# Patient Record
Sex: MALE | Race: WHITE | NOT HISPANIC OR LATINO | Employment: OTHER | URBAN - METROPOLITAN AREA
[De-identification: names, ages, dates, MRNs, and addresses within clinical notes are randomized per-mention and may not be internally consistent; named-entity substitution may affect disease eponyms.]

---

## 2017-07-05 ENCOUNTER — TELEPHONE (OUTPATIENT)
Dept: FAMILY MEDICINE | Facility: OTHER | Age: 77
End: 2017-07-05

## 2017-07-05 DIAGNOSIS — I10 ESSENTIAL HYPERTENSION WITH GOAL BLOOD PRESSURE LESS THAN 140/90: ICD-10-CM

## 2017-07-05 DIAGNOSIS — R33.8 BENIGN PROSTATIC HYPERPLASIA WITH URINARY RETENTION: ICD-10-CM

## 2017-07-05 DIAGNOSIS — E78.5 HYPERLIPIDEMIA LDL GOAL <130: ICD-10-CM

## 2017-07-05 DIAGNOSIS — N40.1 BENIGN PROSTATIC HYPERPLASIA WITH URINARY RETENTION: ICD-10-CM

## 2017-07-06 NOTE — TELEPHONE ENCOUNTER
losartan      Last Written Prescription Date: 08/31/2016  Last Fill Quantity: 90, # refills: 3  Last Office Visit with [facility] or [facility] prescribing provider: 08/31/2016       Potassium   Date Value Ref Range Status   08/31/2016 4.2 3.4 - 5.3 mmol/L Final     Creatinine   Date Value Ref Range Status   08/31/2016 0.74 0.66 - 1.25 mg/dL Final     BP Readings from Last 3 Encounters:   08/31/16 124/72   04/25/16 120/66   10/07/15 137/77     tamsulosin         Last Written Prescription Date: 08/31/2016  Last Fill Quantity: 90, # refills: 3    Last Office Visit with [facility] or [facility] prescribing provider:  08/31/2016   Future Office Visit:      BP Readings from Last 3 Encounters:   08/31/16 124/72   04/25/16 120/66   10/07/15 137/77     Pravastatin     Last Written Prescription Date: 08/31/2016  Last Fill Quantity: 90, # refills: 3  Last Office Visit with [facility] or [facility] prescribing provider: 08/31/2016       Lab Results   Component Value Date    CHOL 125 08/31/2016     Lab Results   Component Value Date    HDL 35 08/31/2016     Lab Results   Component Value Date    LDL 69 08/31/2016     Lab Results   Component Value Date    TRIG 104 08/31/2016     Lab Results   Component Value Date    CHOLHDLRATIO 4.3 09/24/2015         Karina Edward MA

## 2017-07-07 RX ORDER — LOSARTAN POTASSIUM 25 MG/1
TABLET ORAL
Qty: 90 TABLET | Refills: 0 | Status: SHIPPED | OUTPATIENT
Start: 2017-07-07 | End: 2017-08-10

## 2017-07-07 RX ORDER — TAMSULOSIN HYDROCHLORIDE 0.4 MG/1
CAPSULE ORAL
Qty: 90 CAPSULE | Refills: 0 | Status: SHIPPED | OUTPATIENT
Start: 2017-07-07 | End: 2017-08-10

## 2017-07-07 RX ORDER — PRAVASTATIN SODIUM 40 MG
TABLET ORAL
Qty: 90 TABLET | Refills: 0 | Status: SHIPPED | OUTPATIENT
Start: 2017-07-07 | End: 2017-08-10

## 2017-07-07 NOTE — TELEPHONE ENCOUNTER
pravastatin (PRAVACHOL) 40 MG tablet  Medication is being filled for 1 time refill only due to:  Future labs ordered fasting lipid panel in 08/2017. Patient needs to be seen because due for physical 08/2017.     tamsulosin (FLOMAX) 0.4 MG 24 hr capsule  Medication is being filled for 1 time refill only due to:  Patient needs to be seen because ddue for physcial 08/2017.     losartan (COZAAR) 25 MG tablet  Medication is being filled for 1 time refill only due to:  Patient needs to be seen because due for physical 08/2017.       Please assist with scheduling.    Tootie Munson, RN, BSN

## 2017-07-12 ENCOUNTER — HOSPITAL ENCOUNTER (EMERGENCY)
Facility: CLINIC | Age: 77
Discharge: HOME | End: 2017-07-12
Attending: FAMILY MEDICINE | Admitting: FAMILY MEDICINE
Payer: MEDICARE

## 2017-07-12 ENCOUNTER — APPOINTMENT (OUTPATIENT)
Dept: GENERAL RADIOLOGY | Facility: CLINIC | Age: 77
End: 2017-07-12
Attending: FAMILY MEDICINE
Payer: MEDICARE

## 2017-07-12 VITALS
DIASTOLIC BLOOD PRESSURE: 77 MMHG | OXYGEN SATURATION: 96 % | SYSTOLIC BLOOD PRESSURE: 137 MMHG | TEMPERATURE: 98.5 F | BODY MASS INDEX: 34.46 KG/M2 | RESPIRATION RATE: 14 BRPM | WEIGHT: 230 LBS
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
## 2017-07-12 DIAGNOSIS — S62.639B: Primary | ICD-10-CM

## 2017-07-12 DIAGNOSIS — S62.604B: ICD-10-CM

## 2017-07-12 PROCEDURE — 99284 EMERGENCY DEPT VISIT MOD MDM: CPT | Mod: 25 | Performed by: FAMILY MEDICINE

## 2017-07-12 PROCEDURE — 11760 REPAIR OF NAIL BED: CPT | Mod: Z6 | Performed by: FAMILY MEDICINE

## 2017-07-12 PROCEDURE — 11760 REPAIR OF NAIL BED: CPT | Performed by: FAMILY MEDICINE

## 2017-07-12 PROCEDURE — 90715 TDAP VACCINE 7 YRS/> IM: CPT | Performed by: FAMILY MEDICINE

## 2017-07-12 PROCEDURE — 73130 X-RAY EXAM OF HAND: CPT | Mod: TC,RT

## 2017-07-12 PROCEDURE — 25000128 H RX IP 250 OP 636: Performed by: FAMILY MEDICINE

## 2017-07-12 PROCEDURE — 90471 IMMUNIZATION ADMIN: CPT | Performed by: FAMILY MEDICINE

## 2017-07-12 RX ORDER — LIDOCAINE HYDROCHLORIDE 10 MG/ML
10 INJECTION, SOLUTION INFILTRATION; PERINEURAL ONCE
Status: DISCONTINUED | OUTPATIENT
Start: 2017-07-12 | End: 2017-07-12 | Stop reason: HOSPADM

## 2017-07-12 RX ADMIN — CLOSTRIDIUM TETANI TOXOID ANTIGEN (FORMALDEHYDE INACTIVATED), CORYNEBACTERIUM DIPHTHERIAE TOXOID ANTIGEN (FORMALDEHYDE INACTIVATED), BORDETELLA PERTUSSIS TOXOID ANTIGEN (GLUTARALDEHYDE INACTIVATED), BORDETELLA PERTUSSIS FILAMENTOUS HEMAGGLUTININ ANTIGEN (FORMALDEHYDE INACTIVATED), BORDETELLA PERTUSSIS PERTACTIN ANTIGEN, AND BORDETELLA PERTUSSIS FIMBRIAE 2/3 ANTIGEN 0.5 ML: 5; 2; 2.5; 5; 3; 5 INJECTION, SUSPENSION INTRAMUSCULAR at 16:16

## 2017-07-12 ASSESSMENT — ENCOUNTER SYMPTOMS: WOUND: 1

## 2017-07-12 NOTE — ED NOTES
"Pt presents to the ED with concerns of an injury to this right 4 th finger that continues to \"ooze.\"  Pt hurt his hand on a garage door yesterday afternoon.    "

## 2017-07-12 NOTE — ED AVS SNAPSHOT
[facility] Emergency Department    [address] [place] [address]    Phone:  [phone]    Fax:  [phone]                                       Juaquin Shea   MRN: [id number]    Department:  [facility] Emergency Department   Date of Visit:  7/12/2017           Patient Information     Date Of Birth          1940        Your diagnoses for this visit were:     Traumatic open fracture of distal phalanx of finger, initial encounter (right fourth)        You were seen by Michel Edward MD.      Follow-up Information     Follow up with Timo Howard MD. Schedule an appointment as soon as possible for a visit in 1 week.    Specialty:  Family Practice    Why:  For suture removal and repeat exam of this injury    Contact information:    [facility]  [address] [place] [id number] [phone]          Discharge Instructions       Post-procedure notes from Dr Edward:    You have a fracture and laceration of the fourth finger on the right hand. This is called an open fracture and you need to be on antibiotics for this.  I sent the prescription to the [facility] pharmacy.      1. We covered this with petroleum jelly and a dressing today. Please do not use any topical antibiotic on this. The skin has a balance of bacteria and yeast that is neccesary for skin health and healing, and we do not want to affect this. Please use vaseline and a band-aid on the repair to keep it soft as this heals.    2. Keep the area clean and dry for the next 24 hours.  After that it is okay to get the site wet from swimming, showering or bathing.  I recommend that you avoid using a hot tub for the next 4- 5 days.    3. The area around the stitches may start to get red, and that is not a sign of infection in most cases.  The skin is reacting to the stitches and the redness will go away once the stitches are taken out.    4. Plan to sed Dr Howard to get the stitches out  in 7 days.  He will look at this repair and make sure it is healing well.     Skin takes a full twelve months to remodel, but usually after about three months you can see what you have for life.  Please treat the wound gently and keep it covered from the sun as it heals.    Tetanus status: your last tetanus was 2010 and we did not update that today.    Thank you for choosing our Emergency Department for your care.     Sincerely,    Dr Daniel Edward M.D.          Future Appointments        Provider Department Dept Phone Center    8/10/2017 8:45 AM Timo Howard MD, MD [facility] [phone] [facility]      24 Hour Appointment Hotline       To make an appointment at any [facility], call [phone] ([phone]). If you don't have a family doctor or clinic, we will help you find one. [facility] are conveniently located to serve the needs of you and your family.             Review of your medicines      START taking        Dose / Directions Last dose taken    amoxicillin-clavulanate 875-125 MG per tablet   Commonly known as:  AUGMENTIN   Dose:  1 tablet   Quantity:  14 tablet        Take 1 tablet by mouth 2 times daily for 7 days   Refills:  0          Our records show that you are taking the medicines listed below. If these are incorrect, please call your family doctor or clinic.        Dose / Directions Last dose taken    ACETAMINOPHEN 8 HOUR 650 MG 8 hour tablet   Dose:  650 mg   Quantity:  100 tablet   Generic drug:  acetaminophen        Take 650 mg by mouth every 6 hours   Refills:  0        ascorbic acid 1000 MG Tabs   Commonly known as:  vitamin C   Dose:  1000 mg        Take 1 tablet by mouth daily.   Refills:  0        aspirin 81 MG tablet   Dose:  1 tablet        Take 1 tablet by mouth daily.   Refills:  3        fish oil-omega-3 fatty acids 1000 MG capsule   Dose:  1 g        Take 1 g by mouth daily.   Refills:  0        ibuprofen 200 MG tablet   Commonly known  as:  ADVIL/MOTRIN   Dose:  600 mg   Indication:  Mild to Moderate Pain        Take 600 mg by mouth as needed. Indications: Mild to Moderate Pain   Refills:  0        losartan 25 MG tablet   Commonly known as:  COZAAR   Quantity:  90 tablet        TAKE 1 TABLET EVERY DAY   Refills:  0        pravastatin 40 MG tablet   Commonly known as:  PRAVACHOL   Quantity:  90 tablet        TAKE 1 TABLET EVERY DAY   Refills:  0        tamsulosin 0.4 MG capsule   Commonly known as:  FLOMAX   Quantity:  90 capsule        TAKE 1 CAPSULE EVERY DAY   Refills:  0                Prescriptions were sent or printed at these locations (1 Prescription)                   [facility] [address] [place] [address] [address]   [address] [address]    Telephone:  [phone]   Fax:  [phone]   Hours:                  E-Prescribed (1 of 1)         amoxicillin-clavulanate (AUGMENTIN) 875-125 MG per tablet                Procedures and tests performed during your visit     Hand XR, G/E 3 views, right      Orders Needing Specimen Collection     None      Pending Results     No orders found from 7/10/2017 to 7/13/2017.            Pending Culture Results     No orders found from 7/10/2017 to 7/13/2017.            Pending Results Instructions     If you had any lab results that were not finalized at the time of your Discharge, you can call the ED Lab Result RN at [phone]. You will be contacted by this team for any positive Lab results or changes in treatment. The nurses are available 7 days a week from 10A to 6:30P.  You can leave a message 24 hours per day and they will return your call.        Thank you for choosing [facility]       Thank you for choosing [facility] for your care. Our goal is always to provide you with excellent care. Hearing back from our patients is one way we can continue to improve our services. Please take a few minutes to complete the written survey that you may receive in the mail after you  "visit with us. Thank you!        [email or web address] Information     [facility] [employer] lets you send messages to your doctor, view your test results, renew your prescriptions, schedule appointments and more. To sign up, go to [email or web address] . Click on \"Log in\" on the left side of the screen, which will take you to the Welcome page. Then click on \"Sign up Now\" on the right side of the page.     You will be asked to enter the access code listed below, as well as some personal information. Please follow the directions to create your username and password.     Your access code is: [email or web address]  Expires: 10/10/2017  4:20 PM     Your access code will  in 90 days. If you need help or a new code, please call your [facility] clinic or [phone].        Care EveryWhere ID     This is your Care EveryWhere ID. This could be used by other organizations to access your [facility] medical records  [id number]        Equal Access to Services     DANYELLE ANDERSEN : Hadii tono walton hadasho Solongali, waaxda luqadaha, qaybta kaalmada adeegyamachelle, rukhsana bird . So [facility] [phone].    ATENCIÓN: Si habla español, tiene a palomino disposición servicios gratuitos de asistencia lingüística. Llame al [phone].    We comply with applicable federal civil rights laws and [place] laws. We do not discriminate on the basis of race, color, national origin, age, disability sex, sexual orientation or gender identity.            After Visit Summary       This is your record. Keep this with you and show to your community pharmacist(s) and doctor(s) at your next visit.                  "

## 2017-07-12 NOTE — ED PROVIDER NOTES
History     Chief Complaint   Patient presents with     Hand Injury     The history is provided by the patient.     Juaquin Shea is a 77 year old male who presents to the ED with complaints of a hand injury. The patient states that he shut his right 4th finger in the garage door yesterday afternoon at approximately 15:00. He says that, he heard it pop when the incident happened. He reports that he bandaged it in gauze yesterday but it kept bleeding through the bandages, except it did not bleed through overnight. He endorses that when he removed the bandage this morning, it began dripping blood again and he has been unable to stop the bleeding since.     I have reviewed the Medications, Allergies, Past Medical and Surgical History, and Social History in the Epic system.    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 and other lower urinary tract symptoms (LUTS)     Tinnitus     Microalbuminuria     LVH (left ventricular hypertrophy)     HYPERLIPIDEMIA LDL GOAL <130     Advanced directives, counseling/discussion     S/P total knee arthroplasty     DVT prophylaxis     Past Medical History:   Diagnosis Date     Benign neoplasm of colon 2007    adenomatous colon polyps removed     Degeneration of cervical intervertebral disc 2002    multi-level     Degeneration of lumbar or lumbosacral intervertebral disc 2007    multi-level     Erectile dysfunction      Hypertrophy of breast 2004    benign US consistent with gynecomastia     Impaired fasting glucose 2006    fasting      LVH (left ventricular hypertrophy) 5/17/10    suspected, by voltage criteria on ECG     Microalbuminuria  5/18/2010     Nonspecific abnormal results of liver function study 2006         Obesity      Other and unspecified hyperlipidemia 2006     Spinal stenosis, lumbar region, without neurogenic claudication 2007    mod-severe at L2-3 on MRI     Tinnitus 5/17/2010     Past Surgical History:   Procedure Laterality Date     ARTHROPLASTY KNEE  2/20/2012    hemiarthroplasty right knee     ARTHROPLASTY KNEE  9/30/2013    Procedure: ARTHROPLASTY KNEE;  Left Total Knee Arthroplasty;  Surgeon: Jose D Siegel MD;  Location: [facility] OR     COLONOSCOPY  03/27/07, 06/08/10     COLONOSCOPY N/A 6/16/2015    Procedure: COLONOSCOPY;  Surgeon: Yg Rendon MD;  Location: [facility] GI     EXCISE MASS HAND  3/29/2013    Procedure: EXCISE MASS HAND;  excision right hand mass;  Surgeon: Rafa [facility] MD;  Location: [facility] OR     HC COLONOSCOPY W/WO BRUSH/WASH  2000     HC COLONOSCOPY W/WO BRUSH/WASH  2004     HC REPAIR ING HERNIA,5+Y/O,REDUCIBL      X2     HC REVISE MEDIAN N/CARPAL TUNNEL SURG  5/11/2000    left wrist     HC REVISE MEDIAN N/CARPAL TUNNEL SURG  7/21/1994    right wrist     RELEASE CARPAL TUNNEL  12/8/2011    Procedure:RELEASE CARPAL TUNNEL; right carpal tunnel release, right ring and middle finger A-1 pulley releases    ; Surgeon:BARRETT CURRY; Location:[facility] OR     RELEASE TRIGGER FINGER  12/8/2011    Procedure:RELEASE TRIGGER FINGER; Surgeon:BARRETT CURRY; Location:[facility] OR     Family History   Problem Relation Age of Onset     HEART DISEASE Mother      doctored for heart problems at young age     Prostate Cancer Father      age 70s     Cancer - colorectal No family hx of      Respiratory Brother      NE, uses CPAP     Social History   Substance Use Topics     Smoking status: Former Smoker     Packs/day: 0.50     Years: 15.00     Quit date: 1/1/1981     Smokeless tobacco: Current User     Types: Chew      Comment: quit in 1981     Alcohol use Yes      Comment: beer      Immunization History   Administered Date(s)  Administered     Influenza (High Dose) 3 valent vaccine 12/20/2011, 09/17/2012, 10/01/2013, 09/24/2015     Influenza (IIV3) 12/20/2006     Pneumococcal (PCV 13) 09/24/2015     Pneumococcal 23 valent 05/17/2010     TD (ADULT, 7+) 05/04/2000, 05/17/2010     TDAP Vaccine (Adacel) 07/12/2017     Tetanus 01/29/1987     Zoster vaccine, live 11/25/2014     Allergies   Allergen Reactions     No Known Drug Allergies      Current Outpatient Prescriptions   Medication Sig Dispense Refill     amoxicillin-clavulanate (AUGMENTIN) 875-125 MG per tablet Take 1 tablet by mouth 2 times daily for 7 days 14 tablet 0     losartan (COZAAR) 25 MG tablet TAKE 1 TABLET EVERY DAY 90 tablet 0     tamsulosin (FLOMAX) 0.4 MG capsule TAKE 1 CAPSULE EVERY DAY 90 capsule 0     pravastatin (PRAVACHOL) 40 MG tablet TAKE 1 TABLET EVERY DAY 90 tablet 0     acetaminophen 650 MG TABS Take 650 mg by mouth every 6 hours 100 tablet      ibuprofen (ADVIL,MOTRIN) 200 MG tablet Take 600 mg by mouth as needed. Indications: Mild to Moderate Pain       fish oil-omega-3 fatty acids (FISH OIL) 1000 MG capsule Take 1 g by mouth daily.       ascorbic acid (VITAMIN C) 1000 MG TABS Take 1 tablet by mouth daily.       aspirin 81 MG tablet Take 1 tablet by mouth daily.  3     Review of Systems   Musculoskeletal:        Positive for right 4th finger pain   Skin: Positive for wound (laceration to right 4th finger. ).   All other systems reviewed and are negative.    Physical Exam   BP: 141/81  Heart Rate: 62  Temp: 97.1  F (36.2  C)  Resp: 18  Weight: 104.3 kg (230 lb)  SpO2: 96 %    Physical Exam   Constitutional: He is oriented to person, place, and time. He appears well-developed and well-nourished.   HENT:   Head: Atraumatic.   Eyes: Conjunctivae and EOM are normal.   Neck: Normal range of motion. Neck supple.   Cardiovascular: Normal rate, regular rhythm and normal heart sounds.    Pulmonary/Chest: Effort normal and breath sounds normal.   Abdominal: Soft. Bowel  sounds are normal.   Musculoskeletal:        Right hand: He exhibits decreased range of motion (4th finger), tenderness (4th finger) and laceration (4th finger).   Neurological: He is alert and oriented to person, place, and time.   Skin: Skin is warm and dry. Laceration (right 4th finger) noted.   Psychiatric: He has a normal mood and affect. His behavior is normal.   Nursing note and vitals reviewed.    ED Course  There is an open fracture of the right fourth finger. This includes a laceration of the tip of the finger as well as a fracture of the distal phalanx.    We did review benefits and drawbacks, including pain, bleeding, infection and scar formation. We discussed oral antibiotics and I recommend Augment due to this being an open fracture.      For analgesia, 2 mL of 1 % Lidocaine without epinephrine was infused around the wound.      After sufficient time, the wound was cleansed by the ED staff.     I inspected for foreign body and for tendon or vascular damage, and and there is none.  There is partial evulsion of the nail bed.  Exam of NVM function prior to repair shows normal function. The wound edges appear even and intact, and did sue need revision.    The nail bed was re-approximated with a 3-0 Ethilon mattress suture.  The wound was closed with an interrupted suture of 4-0 Ethilon.     Exam of the NVM function after the repair showed normal function.    The wound was dressed with Vaseline and a dressing today.  Follow up instructions were reviewed, and a printed copy will be provided as well. Tetanus status: we did update your tetanus today    The patient tolerated the procedure well.      Plan follow up in 7 days for suture removal and follow up of the fracture.         ED Course     Procedures      Results for orders placed or performed during the hospital encounter of 07/12/17 (from the past 24 hour(s))   Hand XR, G/E 3 views, right    Narrative    XR HAND RT G/E 3 VW 7/12/2017 3:08  PM    COMPARISON: None.    HISTORY: Pain      Impression    IMPRESSION:   1. Comminuted mildly displaced tuft fracture involving the right  fourth finger.  2. Osteopenia is noted about the right hand.  3. Extensive first metacarpophalangeal and radiocarpal joint  degenerative changes. There is periarticular calcifications,  suggesting gout.    ERA VERONICA     Medications   lidocaine 1 % injection 10 mL (not administered)   Tdap (tetanus-diphtheria-acell pertussis) (ADACEL) injection 0.5 mL (0.5 mLs Intramuscular Given 7/12/17 1616)     Assessments & Plan (with Medical Decision Making)  Juaquin came to the ED with a finger injury that occurred yesterday at home. He caught the tips of his right hand fingers in a garage door, where the panels fold together.  The fourth finger caught the brunt of the injury.  VSS in the ED. Exam shows bleeding from a laceration at the edge of the tip of the finger and a partial nail bed avulsion. Xray shows a fracture of the tuft. Repair was done as noted above. Tetanus was updated. I recommend he use Augmentin to prevent infection and sent a prescription to the [facility] in [place]. I want him to follow up with Dr Howard in about a week.      I have reviewed the nursing notes.    I have reviewed the findings, diagnosis, plan and need for follow up with the patient.       New Prescriptions    AMOXICILLIN-CLAVULANATE (AUGMENTIN) 875-125 MG PER TABLET    Take 1 tablet by mouth 2 times daily for 7 days       Final diagnoses:   Traumatic open fracture of distal phalanx of finger, initial encounter (right fourth)     This document serves as a record of services personally performed by Michel Edward MD. It was created on their behalf by Roseline Blank, a trained medical scribe. The creation of this record is based on the provider's personal observations and the statements of the patient. This document has been checked and approved by the attending provider.    Note: Chart  documentation done in part with Dragon Voice Recognition software. Although reviewed after completion, some word and grammatical errors may remain.    7/12/2017   [facility] EMERGENCY DEPARTMENT     Michel Edward MD  07/12/17 7233

## 2017-07-12 NOTE — DISCHARGE INSTRUCTIONS
Post-procedure notes from Dr Edward:    You have a fracture and laceration of the fourth finger on the right hand. This is called an open fracture and you need to be on antibiotics for this.  I sent the prescription to the [facility] pharmacy.      1. We covered this with petroleum jelly and a dressing today. Please do not use any topical antibiotic on this. The skin has a balance of bacteria and yeast that is neccesary for skin health and healing, and we do not want to affect this. Please use vaseline and a band-aid on the repair to keep it soft as this heals.    2. Keep the area clean and dry for the next 24 hours.  After that it is okay to get the site wet from swimming, showering or bathing.  I recommend that you avoid using a hot tub for the next 4- 5 days.    3. The area around the stitches may start to get red, and that is not a sign of infection in most cases.  The skin is reacting to the stitches and the redness will go away once the stitches are taken out.    4. Plan to sed Dr Howard to get the stitches out in 7 days.  He will look at this repair and make sure it is healing well.     Skin takes a full twelve months to remodel, but usually after about three months you can see what you have for life.  Please treat the wound gently and keep it covered from the sun as it heals.    Tetanus status: your last tetanus was 2010 and we did not update that today.    Thank you for choosing our Emergency Department for your care.     Sincerely,    Dr Daniel Edward M.D.

## 2017-07-12 NOTE — ED NOTES
Yesterday at about 1500 he smashed his finger in the garage door and received a laceration on the ring finger on the right hand. Patient has been wrapping it with gauze but it continues to keep bleeding.   Patient is in X-ray now

## 2017-07-12 NOTE — ED AVS SNAPSHOT
[facility] Emergency Department    [address] [place] [address]    Phone:  [phone]    Fax:  [phone]                                       Juaquin Shea   MRN: [id number]    Department:  [facility] Emergency Department   Date of Visit:  7/12/2017           After Visit Summary Signature Page     I have received my discharge instructions, and my questions have been answered. I have discussed any challenges I see with this plan with the nurse or doctor.    ..........................................................................................................................................  Patient/Patient Representative Signature      ..........................................................................................................................................  Patient Representative Print Name and Relationship to Patient    ..................................................               ................................................  Date                                            Time    ..........................................................................................................................................  Reviewed by Signature/Title    ...................................................              ..............................................  Date                                                            Time

## 2017-07-19 ENCOUNTER — OFFICE VISIT (OUTPATIENT)
Dept: FAMILY MEDICINE | Facility: OTHER | Age: 77
End: 2017-07-19
Payer: COMMERCIAL

## 2017-07-19 VITALS
RESPIRATION RATE: 12 BRPM | SYSTOLIC BLOOD PRESSURE: 128 MMHG | DIASTOLIC BLOOD PRESSURE: 70 MMHG | HEIGHT: 69 IN | HEART RATE: 80 BPM | BODY MASS INDEX: 34.96 KG/M2 | WEIGHT: 236 LBS | TEMPERATURE: 97.8 F

## 2017-07-19 DIAGNOSIS — S62.664D OPEN NONDISPLACED FRACTURE OF DISTAL PHALANX OF RIGHT RING FINGER WITH ROUTINE HEALING, SUBSEQUENT ENCOUNTER: ICD-10-CM

## 2017-07-19 DIAGNOSIS — S61.314D LACERATION OF RIGHT RING FINGER WITHOUT FOREIGN BODY WITH DAMAGE TO NAIL, SUBSEQUENT ENCOUNTER: Primary | ICD-10-CM

## 2017-07-19 PROCEDURE — 99213 OFFICE O/P EST LOW 20 MIN: CPT | Performed by: PHYSICIAN ASSISTANT

## 2017-07-19 ASSESSMENT — PAIN SCALES - GENERAL: PAINLEVEL: NO PAIN (0)

## 2017-07-19 NOTE — NURSING NOTE
"Chief Complaint   Patient presents with     ER F/U       Initial /70  Pulse 80  Temp 97.8  F (36.6  C) (Oral)  Resp 12  Ht 5' 8.5\" (1.74 m)  Wt 236 lb (107 kg)  BMI 35.36 kg/m2 Estimated body mass index is 35.36 kg/(m^2) as calculated from the following:    Height as of this encounter: 5' 8.5\" (1.74 m).    Weight as of this encounter: 236 lb (107 kg).  Medication Reconciliation: complete     Kenisha Pandey CMA (AAMA)      "

## 2017-07-19 NOTE — MR AVS SNAPSHOT
"              After Visit Summary   7/19/2017    Juaquin Shea    MRN: [id number]           Patient Information     Date Of Birth          1940        Visit Information        Provider Department      7/19/2017 11:00 AM Carie Child PA-C [facility]         Follow-ups after your visit        Your next 10 appointments already scheduled     Aug 10, 2017  8:45 AM CDT   PHYSICAL with Timo Howard MD   [facility] ([facility]    [address] [address] [facility] [address] [phone]              Who to contact     If you have questions or need follow up information about today's clinic visit or your schedule please contact [facility] directly at [phone].  Normal or non-critical lab and imaging results will be communicated to you by MyChart, letter or phone within 4 business days after the clinic has received the results. If you do not hear from us within 7 days, please contact the clinic through MyChart or phone. If you have a critical or abnormal lab result, we will notify you by phone as soon as possible.  Submit refill requests through [employer] or call your pharmacy and they will forward the refill request to us. Please allow 3 business days for your refill to be completed.          Additional Information About Your Visit        [employer] Information     [employer]ar lets you send messages to your doctor, view your test results, renew your prescriptions, schedule appointments and more. To sign up, go to [email or web address] . Click on \"Log in\" on the left side of the screen, which will take you to the Welcome page. Then click on \"Sign up Now\" on the right side of the page.     You will be asked to enter the access code listed below, as well as some personal information. Please follow the directions to create your username and password.     Your access code is: [email or web address]  Expires: 10/10/2017  4:20 PM     Your access code will " " in 90 days. If you need help or a new code, please call your [facility] clinic or [phone].        Care EveryWhere ID     This is your Care EveryWhere ID. This could be used by other organizations to access your [facility] medical records  [id number]        Your Vitals Were     Pulse Temperature Respirations Height BMI (Body Mass Index)       80 97.8  F (36.6  C) (Oral) 12 5' 8.5\" (1.74 m) 35.36 kg/m2        Blood Pressure from Last 3 Encounters:   17 128/70   17 137/77   16 124/72    Weight from Last 3 Encounters:   17 236 lb (107 kg)   17 230 lb (104.3 kg)   16 235 lb (106.6 kg)              Today, you had the following     No orders found for display       Primary Care Provider Office Phone # Fax #    Timo Los Howard [phone]       [facility] [address] [place] [address]        Equal Access to Services     [facility]: Hadii aad ku hadasho Soomaali, waaxda luqadaha, qaybta kaalmada adeegyamachelle, rukhsana bird . So [facility] [phone].    ATENCIÓN: Si habla español, tiene a palomino disposición servicios gratuitos de asistencia lingüística. Llame al [phone].    We comply with applicable federal civil rights laws and [place] laws. We do not discriminate on the basis of race, color, national origin, age, disability sex, sexual orientation or gender identity.            Thank you!     Thank you for choosing [facility]  for your care. Our goal is always to provide you with excellent care. Hearing back from our patients is one way we can continue to improve our services. Please take a few minutes to complete the written survey that you may receive in the mail after your visit with us. Thank you!             Your Updated Medication List - Protect others around you: Learn how to safely use, store and throw away your medicines at www.disposemymeds.org.          This list is accurate as of: " 7/19/17 12:28 PM.  Always use your most recent med list.                   Brand Name Dispense Instructions for use Diagnosis    ACETAMINOPHEN 8 HOUR 650 MG 8 hour tablet   Generic drug:  acetaminophen     100 tablet    Take 650 mg by mouth every 6 hours    Post-operative pain       amoxicillin-clavulanate 875-125 MG per tablet    AUGMENTIN    14 tablet    Take 1 tablet by mouth 2 times daily for 7 days    Traumatic open fracture of distal phalanx of finger, initial encounter       ascorbic acid 1000 MG Tabs    vitamin C     Take 1 tablet by mouth daily.        aspirin 81 MG tablet      Take 1 tablet by mouth daily.        fish oil-omega-3 fatty acids 1000 MG capsule      Take 1 g by mouth daily.        ibuprofen 200 MG tablet    ADVIL/MOTRIN     Take 600 mg by mouth as needed. Indications: Mild to Moderate Pain        losartan 25 MG tablet    COZAAR    90 tablet    TAKE 1 TABLET EVERY DAY    Essential hypertension with goal blood pressure less than 140/90       pravastatin 40 MG tablet    PRAVACHOL    90 tablet    TAKE 1 TABLET EVERY DAY    Hyperlipidemia LDL goal <130       tamsulosin 0.4 MG capsule    FLOMAX    90 capsule    TAKE 1 CAPSULE EVERY DAY    Benign prostatic hyperplasia with urinary retention

## 2017-07-19 NOTE — PROGRESS NOTES
"  SUBJECTIVE:                                                    Juaquin Shea is a 77 year old male who presents to clinic today for the following health issues:      ED/UC Followup:    Facility:  [facility]  Date of visit: 7/12/17  Reason for visit: traumatic open fracture of distal phalanx of finger  Current Status: stated his finger feels fine, some \"weeping\" but not much swelling, no fevers/chills/sweats       He has been doing very well.  No pain unless he bumps the tip of his finger.  No fevers or chills.  No issues with taking Augmentin, he is finishing up his last day today. No diarrhea.  They have kept it covered.        Problem list and histories reviewed & adjusted, as indicated.  Additional history: as documented    BP Readings from Last 3 Encounters:   07/19/17 128/70   07/12/17 137/77   08/31/16 124/72    Wt Readings from Last 3 Encounters:   07/19/17 236 lb (107 kg)   07/12/17 230 lb (104.3 kg)   08/31/16 235 lb (106.6 kg)                  Labs reviewed in EPIC      Reviewed and updated as needed this visit by clinical staff       Reviewed and updated as needed this visit by Provider         ROS:  No fevers, increasing pain, redness or swelling    OBJECTIVE:     /70  Pulse 80  Temp 97.8  F (36.6  C) (Oral)  Resp 12  Ht 5' 8.5\" (1.74 m)  Wt 236 lb (107 kg)  BMI 35.36 kg/m2  Body mass index is 35.36 kg/(m^2).  GENERAL: healthy, alert and no distress  Right hand 4th finger, well healed laceration of distal finger involving the nail.  No expanding erythema or edema.  Normal range of motion at the DIP joint.. No dehiscence or purulence some reduced sensation to light touch of the distal aspect of finger tip, mild edema     SR by Hawk HARDIN    Diagnostic Test Results:  none     ASSESSMENT/PLAN:         1. Laceration of right ring finger without foreign body with damage to nail, subsequent encounter  Suture removal by our clinic Gurmeet Arechiga.   Wound appears to be well-healed without signs of " infection he will complete Augmentin today    2. Open nondisplaced fracture of distal phalanx of right ring finger with routine healing, subsequent encounter  Has virtually no pain,  Could consider another x-ray if he has pain on reinjury      Follow-up as needed    Carie Child PA-C  [facility]  Electronically signed by Carie Child PA-C

## 2017-07-19 NOTE — NURSING NOTE
Juaquin Shea presents to the clinic today for suture/staple removal.    The patient has had the sutures/staples placed on 07/12/2017  There has been no history of infection or drainage.  Tetanus status is up to date.     OBJECTIVE:   3 sutures/staples are seen located on the right ring finger.    The wound is healing well with no signs of infection.      ASSESSMENT:   Suture/Staple Removal.    PLAN:    All sutures were easily removed today. Routine wound care discussed.  The patient will follow up as needed.    Hawk Arora, RN, BSN

## 2017-08-08 NOTE — PATIENT INSTRUCTIONS
We'll let you know your lab results as soon as we can.     Keep working on exercising and getting to a healthy weight.    Let me know if you'd like to see urology about your concerns.    Contact us or return if questions or concerns.     Have a nice day!    Dr. Howard     Preventive Health Recommendations:       Male Ages 65 and over    Yearly exam:             See your health care provider every year in order to  o   Review health changes.   o   Discuss preventive care.    o   Review your medicines if your doctor has prescribed any.    Talk with your health care provider about whether you should have a test to screen for prostate cancer (PSA).    Every 3 years, have a diabetes test (fasting glucose). If you are at risk for diabetes, you should have this test more often.    Every 5 years, have a cholesterol test. Have this test more often if you are at risk for high cholesterol or heart disease.     Every 10 years, have a colonoscopy. Or, have a yearly FIT test (stool test). These exams will check for colon cancer.    Talk to with your health care provider about screening for Abdominal Aortic Aneurysm if you have a family history of AAA or have a history of smoking.  Shots:     Get a flu shot each year.     Get a tetanus shot every 10 years.     Talk to your doctor about your pneumonia vaccines. There are now two you should receive - Pneumovax (PPSV 23) and Prevnar (PCV 13).    Talk to your doctor about a shingles vaccine.     Talk to your doctor about the hepatitis B vaccine.  Nutrition:     Eat at least 5 servings of fruits and vegetables each day.     Eat whole-grain bread, whole-wheat pasta and brown rice instead of white grains and rice.     Talk to your doctor about Calcium and Vitamin D.   Lifestyle    Exercise for at least 150 minutes a week (30 minutes a day, 5 days a week). This will help you control your weight and prevent disease.     Limit alcohol to one drink per day.     No smoking.     Wear sunscreen  to prevent skin cancer.     See your dentist every six months for an exam and cleaning.     See your eye doctor every 1 to 2 years to screen for conditions such as glaucoma, macular degeneration and cataracts.

## 2017-08-08 NOTE — PROGRESS NOTES
"  SUBJECTIVE:   Juaquin Shea is a 77 year old male who presents for Preventive Visit.  {PVP to remind patient that this is not necessarily a physical exam; physical exam may or may not be done:334940::\"click delete button to remove this line now\"}  {PVP to inform patient that additional E&M charge may apply, if additional problems addressed:463419::\"click delete button to remove this line now\"}  Are you in the first 12 months of your Medicare Part B coverage?  {No Yes:715087::\"No\"}    Healthy Habits:    Do you get at least three servings of calcium containing foods daily (dairy, green leafy vegetables, etc.)? {YES/NO, DAIRY INTAKE:164142::\"yes\"}    Amount of exercise or daily activities, outside of work: {AMOUNT EXERCISE:070721}    Problems taking medications regularly {Yes /No default:757165::\"No\"}    Medication side effects: {Yes /No default.:682684::\"No\"}    Have you had an eye exam in the past two years? {YESNOBLANK:687926}    Do you see a dentist twice per year? {YESNOBLANK:248379}    Do you have sleep apnea, excessive snoring or daytime drowsiness?{YESNOBLANK:420040}    {AWV Cognitive Screenin}    {Outside tests to abstract? :882345}    {additional problems to add (Optional):749895}    Reviewed and updated as needed this visit by clinical staff         Reviewed and updated as needed this visit by Provider        Social History   Substance Use Topics     Smoking status: Former Smoker     Packs/day: 0.50     Years: 15.00     Quit date: 1981     Smokeless tobacco: Current User     Types: Chew      Comment: quit in      Alcohol use Yes      Comment: beer       {ETOH AUDIT:126250}    Today's PHQ-2 Score:   PHQ-2 (  [employer]) 2016 10/7/2015   Q1: Little interest or pleasure in doing things 0 0   Q2: Feeling down, depressed or hopeless 0 0   PHQ-2 Score 0 0     {PHQ-2 LOOK IN ASSESSMENTS (Optional) :822093}  Do you feel safe in your environment - {YES/NO/NA:118757}    Do you have a Health " "Care Directive?: {HEALTHCARE DIRECTIVE STATUS:481270}    Current providers sharing in care for this patient include:   Patient Care Team:  Timo Howard MD as PCP - General (Family Practice)      Hearing impairment: {NO/YES:744015}    Ability to successfully perform activities of daily living: {YES/NO (MEDICARE):753536::\"Yes, no assistance needed\"}     Fall risk:  {Document Fall Risk in the Assessments Section of the Navigator:254930}    Home safety:  {IPPE SAFETY CONCERNS:846302::\"none identified\"}  {If any of the above assessments are answered yes, consider ordering appropriate referrals (Optional):387706::\"click delete button to remove this line now\"}    The following health maintenance items are reviewed in Epic and correct as of today:  Health Maintenance   Topic Date Due     ADVANCE DIRECTIVE PLANNING Q5 YRS  2016     FALL RISK ASSESSMENT  2017     BMP Q1 YR  2017     TOBACCO CESSATION COUNSELING Q1 YR  2017     LIPID MONITORING Q1 YEAR  2017     INFLUENZA VACCINE (SYSTEM ASSIGNED)  2017     TETANUS IMMUNIZATION (SYSTEM ASSIGNED)  2027     PNEUMOCOCCAL  Completed     {Chronicprobdata (Optional):350540}    {Decision Support (Optional):637597}    ROS:  {ROS COMP:648289}    OBJECTIVE:   There were no vitals taken for this visit. Estimated body mass index is 35.36 kg/(m^2) as calculated from the following:    Height as of 17: 5' 8.5\" (1.74 m).    Weight as of 17: 236 lb (107 kg).  EXAM:   {Exam :990917}    ASSESSMENT / PLAN:   {Diag Picklist:076078}    End of Life Planning:  Patient currently has an advanced directive: { :165195}    COUNSELING:  {Medicare Counselin}    {BP Counseling- Complete if BP >= 120/80  (Optional):435213}    Estimated body mass index is 35.36 kg/(m^2) as calculated from the following:    Height as of 17: 5' 8.5\" (1.74 m).    Weight as of 17: 236 lb (107 kg).  {Weight Management Plan -- Complete if patient has an " abnormal BMI (Optional):384410}   reports that he quit smoking about 36 years ago. He has a 7.50 pack-year smoking history. His smokeless tobacco use includes Chew.  {Tobacco Cessation -- Complete if patient is a smoker (Optional):564801}    Appropriate preventive services were discussed with this patient, including applicable screening as appropriate for cardiovascular disease, diabetes, osteopenia/osteoporosis, and glaucoma.  As appropriate for age/gender, discussed screening for colorectal cancer, prostate cancer, breast cancer, and cervical cancer. Checklist reviewing preventive services available has been given to the patient.    Reviewed patients plan of care and provided an AVS. The {CarePlan:409113} for Juaquin meets the Care Plan requirement. This Care Plan has been established and reviewed with the {PATIENT, FAMILY MEMBER, CAREGIVER:457108}.    Counseling Resources:  ATP IV Guidelines  Pooled Cohorts Equation Calculator  Breast Cancer Risk Calculator  FRAX Risk Assessment  ICSI Preventive Guidelines  Dietary Guidelines for Americans, 2010  Maximum Balance Foundation's MyPlate  ASA Prophylaxis  Lung CA Screening    Timo Howard MD, MD  [facility]

## 2017-08-08 NOTE — PROGRESS NOTES
SUBJECTIVE:   Juaquin Shea is a 77 year old male who presents for Preventive Visit.    Are you in the first 12 months of your Medicare coverage?  No    Physical   Annual:     Getting at least 3 servings of Calcium per day::  Yes    Bi-annual eye exam::  Yes    Dental care twice a year::  Yes    Sleep apnea or symptoms of sleep apnea::  None    Diet::  Regular (no restrictions)    Frequency of exercise::  6-7 days/week    Duration of exercise::  Greater than 60 minutes    Taking medications regularly::  Yes    Medication side effects::  None, No muscle aches and No significant flushing    Additional concerns today::  YES (Discuss health, worried about cancer both brothers are fighting cancer)    His two younger brothers have/are dealing with cancer.  Youngest brother had prostate CA.  Other brother had stomach cancer.  He wasn't able to swallow when he was camping, and found this.     COGNITIVE SCREEN  1) Repeat 3 items (Banana, Sunrise, Chair)      2) Clock draw:   NORMAL  3) 3 item recall:   Recalls 2 objects   Results: NORMAL clock, 1-2 items recalled: COGNITIVE IMPAIRMENT LESS LIKELY    Mini-CogTM Copyright S Amy. Licensed by the author for use in [facility]; reprinted with permission (philipp@[facility]). All rights reserved.          Reviewed and updated as needed this visit by clinical staff  Tobacco  Allergies  Med Hx  Surg Hx  Fam Hx  Soc Hx        Reviewed and updated as needed this visit by Provider        Social History   Substance Use Topics     Smoking status: Former Smoker     Packs/day: 0.50     Years: 15.00     Quit date: 1/1/1981     Smokeless tobacco: Former User     Types: Chew      Comment: quit in 1981     Alcohol use Yes      Comment: beer occ       The patient does not drink >3 drinks per day nor >7 drinks per week.              Today's PHQ-2 Score:   PHQ-2 ( 1999 Pf[employer]) 8/10/2017   Q1: Little interest or pleasure in doing things 0   Q2: Feeling down, depressed or hopeless 0    PHQ-2 Score 0   Q1: Little interest or pleasure in doing things Not at all   Q2: Feeling down, depressed or hopeless Not at all   PHQ-2 Score 0       Do you feel safe in your environment - Yes    Do you have a Health Care Directive?: Yes: Advance Directive has been received and scanned.    Current providers sharing in care for this patient include:   Patient Care Team:  Timo Howard MD as PCP - General (Family Practice)      Hearing impairment: Yes, Difficulty following a conversation in a noisy restaurant or crowded room.    Feel that people are mumbling or not speaking clearly.    Difficult to understand a speaker at a public meeting or Jewish service.    Need to ask people to speak up or repeat themselves.    Difficulty understanding soft or whispered speech.    Ability to successfully perform activities of daily living: Yes, no assistance needed     Fall risk:  Fallen 2 or more times in the past year?: No  Any fall with injury in the past year?: No      Home safety:  none identified      The following health maintenance items are reviewed in Epic and correct as of today:  Health Maintenance   Topic Date Due     ADVANCE DIRECTIVE PLANNING Q5 YRS  09/16/2016     FALL RISK ASSESSMENT  04/25/2017     BMP Q1 YR  08/31/2017     TOBACCO CESSATION COUNSELING Q1 YR  08/31/2017     LIPID MONITORING Q1 YEAR  08/31/2017     INFLUENZA VACCINE (SYSTEM ASSIGNED)  09/01/2017     TETANUS IMMUNIZATION (SYSTEM ASSIGNED)  07/12/2027     PNEUMOCOCCAL  Completed     BP Readings from Last 3 Encounters:   08/10/17 126/68   07/19/17 128/70   07/12/17 137/77    Wt Readings from Last 3 Encounters:   08/10/17 236 lb 6.4 oz (107.2 kg)   07/19/17 236 lb (107 kg)   07/12/17 230 lb (104.3 kg)                  Current Outpatient Prescriptions   Medication Sig Dispense Refill     losartan (COZAAR) 25 MG tablet TAKE 1 TABLET EVERY DAY 90 tablet 0     tamsulosin (FLOMAX) 0.4 MG capsule TAKE 1 CAPSULE EVERY DAY 90 capsule 0      "pravastatin (PRAVACHOL) 40 MG tablet TAKE 1 TABLET EVERY DAY 90 tablet 0     acetaminophen 650 MG TABS Take 650 mg by mouth every 6 hours 100 tablet      ibuprofen (ADVIL,MOTRIN) 200 MG tablet Take 600 mg by mouth as needed. Indications: Mild to Moderate Pain       fish oil-omega-3 fatty acids (FISH OIL) 1000 MG capsule Take 1 g by mouth daily.       ascorbic acid (VITAMIN C) 1000 MG TABS Take 1 tablet by mouth daily.       aspirin 81 MG tablet Take 1 tablet by mouth daily.  3     Recent Labs   Lab Test  08/31/16   0833  09/24/15   1352  09/29/14   0919  10/01/13   0610   05/17/10   1002   LDL  69  89  103   --    < >  150*   HDL  35*  38*  41   --    < >  37*   TRIG  104  191*  103   --    < >  152*   ALT   --   60  36  32   < >  58   CR  0.74  0.58*  0.77  0.80   < >  0.84   GFRESTIMATED  >90  Non  GFR Calc    >90  Non  GFR Calc    >90  Non  GFR Calc    >90   < >  >90   GFRESTBLACK  >90   GFR Calc    >90   GFR Calc    >90   GFR Calc    >90   < >  >90   POTASSIUM  4.2  4.6  4.2  4.0   < >  4.4   TSH   --    --    --    --    --   1.97    < > = values in this interval not displayed.                ROS:  Constitutional, HEENT, cardiovascular, pulmonary, gi and gu systems are negative, except as otherwise noted.      OBJECTIVE:   /68 (BP Location: Left arm, Patient Position: Chair, Cuff Size: Adult Large)  Pulse 60  Temp 97.8  F (36.6  C) (Temporal)  Resp 16  Ht 5' 8.25\" (1.734 m)  Wt 236 lb 6.4 oz (107.2 kg)  BMI 35.68 kg/m2 Estimated body mass index is 35.68 kg/(m^2) as calculated from the following:    Height as of this encounter: 5' 8.25\" (1.734 m).    Weight as of this encounter: 236 lb 6.4 oz (107.2 kg).  EXAM:   GENERAL: healthy, alert and no distress  EYES: Eyes grossly normal to inspection, PERRL and conjunctivae and sclerae normal  HENT: ear canals and TM's normal, nose and mouth without ulcers or " lesions  NECK: no adenopathy, no asymmetry, masses, or scars and thyroid normal to palpation  RESP: lungs clear to auscultation - no rales, rhonchi or wheezes  CV: regular rate and rhythm, normal S1 S2, no S3 or S4, no murmur, click or rub, no peripheral edema and peripheral pulses strong  ABDOMEN: soft, nontender, no hepatosplenomegaly, no masses and bowel sounds normal  RECTAL: normal sphincter tone, no rectal masses and prostate 2+ enlarged, nontender  MS: no gross musculoskeletal defects noted, no edema  SKIN: no suspicious lesions or rashes  NEURO: Normal strength and tone, mentation intact and speech normal  PSYCH: mentation appears normal, affect normal/bright    ASSESSMENT / PLAN:   1. Medicare annual wellness visit, subsequent  Reviewed recommended screenings and ordered appropriate testing for pt's risks and per pt's request(s).     2. Essential hypertension with goal blood pressure less than 140/90  Currently Controlled.  Continue current regimen.  Check labs.  Call/return if any problems or questions arise.   - Lipid Profile (Chol, Trig, HDL, LDL calc)  - Basic metabolic panel  (Ca, Cl, CO2, Creat, Gluc, K, Na, BUN)  - HONORING CHOICES REFERRAL    3. Benign prostatic hyperplasia with urinary retention  Currently Controlled.  Continue current regimen.  Call/return if any problems or questions arise.     4. Hyperlipidemia LDL goal <130  Currently Controlled.  Continue current regimen.  Check labs.  Call/return if any problems or questions arise.   - Lipid Profile (Chol, Trig, HDL, LDL calc)    5. Family history of prostate cancer  Discussed possible screening.  Pt wanted to proceed despite likelihood of having benign prostate cancer at his age.  Labs done.  Exam reassuring overall.  - PSA, screen    6. Special screening for malignant neoplasm of prostate  See above.  - PSA, screen    End of Life Planning:  Patient currently has an advanced directive: Yes.  Practitioner is supportive of  "decision.    COUNSELING:  Reviewed preventive health counseling, as reflected in patient instructions       Consider AAA screening for ages 65-75 and smoking history       Regular exercise       Healthy diet/nutrition       Aspirin Prophylaxsis       Consider lung cancer screening for ages 55-80 years and 30 pack-year smoking history-NA       Colon cancer screening       Prostate cancer screening       Osteoporosis Prevention/Bone HealthThe ASCVD Risk score (Anne HARTMAN Jr, et al., 2013) failed to calculate for the following reasons:    The valid total cholesterol range is 130 to 320 mg/dL                   Estimated body mass index is 35.68 kg/(m^2) as calculated from the following:    Height as of this encounter: 5' 8.25\" (1.734 m).    Weight as of this encounter: 236 lb 6.4 oz (107.2 kg).  Weight management plan: Discussed healthy diet and exercise guidelines and patient will follow up in 12 months in clinic to re-evaluate.   reports that he quit smoking about 36 years ago. He has a 7.50 pack-year smoking history. He has quit using smokeless tobacco. His smokeless tobacco use included Chew.        Appropriate preventive services were discussed with this patient, including applicable screening as appropriate for cardiovascular disease, diabetes, osteopenia/osteoporosis, and glaucoma.  As appropriate for age/gender, discussed screening for colorectal cancer, prostate cancer, breast cancer, and cervical cancer. Checklist reviewing preventive services available has been given to the patient.    Reviewed patients plan of care and provided an AVS. The Intermediate Care Plan ( asthma action plan, low back pain action plan, and migraine action plan) for Juaquin meets the Care Plan requirement. This Care Plan has been established and reviewed with the Patient.    Counseling Resources:  ATP IV Guidelines  Pooled Cohorts Equation Calculator  Breast Cancer Risk Calculator  FRAX Risk Assessment  ICSI Preventive Guidelines  Dietary " Guidelines for Americans, 2010  USDA's MyPlate  ASA Prophylaxis  Lung CA Screening    Timo Howard MD, MD  [facility]

## 2017-08-10 ENCOUNTER — OFFICE VISIT (OUTPATIENT)
Dept: FAMILY MEDICINE | Facility: OTHER | Age: 77
End: 2017-08-10
Payer: COMMERCIAL

## 2017-08-10 ENCOUNTER — TELEPHONE (OUTPATIENT)
Dept: FAMILY MEDICINE | Facility: OTHER | Age: 77
End: 2017-08-10

## 2017-08-10 VITALS
TEMPERATURE: 97.8 F | WEIGHT: 236.4 LBS | HEART RATE: 60 BPM | SYSTOLIC BLOOD PRESSURE: 126 MMHG | HEIGHT: 68 IN | RESPIRATION RATE: 16 BRPM | BODY MASS INDEX: 35.83 KG/M2 | DIASTOLIC BLOOD PRESSURE: 68 MMHG

## 2017-08-10 DIAGNOSIS — Z00.00 MEDICARE ANNUAL WELLNESS VISIT, SUBSEQUENT: Primary | ICD-10-CM

## 2017-08-10 DIAGNOSIS — R33.8 BENIGN PROSTATIC HYPERPLASIA WITH URINARY RETENTION: ICD-10-CM

## 2017-08-10 DIAGNOSIS — E78.5 HYPERLIPIDEMIA LDL GOAL <130: ICD-10-CM

## 2017-08-10 DIAGNOSIS — I10 ESSENTIAL HYPERTENSION WITH GOAL BLOOD PRESSURE LESS THAN 140/90: ICD-10-CM

## 2017-08-10 DIAGNOSIS — N40.1 BENIGN PROSTATIC HYPERPLASIA WITH URINARY RETENTION: ICD-10-CM

## 2017-08-10 DIAGNOSIS — Z12.5 SPECIAL SCREENING FOR MALIGNANT NEOPLASM OF PROSTATE: ICD-10-CM

## 2017-08-10 DIAGNOSIS — Z80.42 FAMILY HISTORY OF PROSTATE CANCER: ICD-10-CM

## 2017-08-10 LAB
ANION GAP SERPL CALCULATED.3IONS-SCNC: 7 MMOL/L (ref 3–14)
BUN SERPL-MCNC: 16 MG/DL (ref 7–30)
CALCIUM SERPL-MCNC: 9.2 MG/DL (ref 8.5–10.1)
CHLORIDE SERPL-SCNC: 108 MMOL/L (ref 94–109)
CHOLEST SERPL-MCNC: 146 MG/DL
CO2 SERPL-SCNC: 29 MMOL/L (ref 20–32)
CREAT SERPL-MCNC: 0.71 MG/DL (ref 0.66–1.25)
GFR SERPL CREATININE-BSD FRML MDRD: ABNORMAL ML/MIN/1.7M2
GLUCOSE SERPL-MCNC: 109 MG/DL (ref 70–99)
HDLC SERPL-MCNC: 42 MG/DL
LDLC SERPL CALC-MCNC: 88 MG/DL
NONHDLC SERPL-MCNC: 104 MG/DL
POTASSIUM SERPL-SCNC: 4.4 MMOL/L (ref 3.4–5.3)
PSA SERPL-ACNC: 1.08 UG/L (ref 0–4)
SODIUM SERPL-SCNC: 144 MMOL/L (ref 133–144)
TRIGL SERPL-MCNC: 78 MG/DL

## 2017-08-10 PROCEDURE — 99397 PER PM REEVAL EST PAT 65+ YR: CPT | Performed by: FAMILY MEDICINE

## 2017-08-10 PROCEDURE — 80061 LIPID PANEL: CPT | Performed by: FAMILY MEDICINE

## 2017-08-10 PROCEDURE — G0103 PSA SCREENING: HCPCS | Performed by: FAMILY MEDICINE

## 2017-08-10 PROCEDURE — 36415 COLL VENOUS BLD VENIPUNCTURE: CPT | Performed by: FAMILY MEDICINE

## 2017-08-10 PROCEDURE — 80048 BASIC METABOLIC PNL TOTAL CA: CPT | Performed by: FAMILY MEDICINE

## 2017-08-10 RX ORDER — LOSARTAN POTASSIUM 25 MG/1
TABLET ORAL
Qty: 90 TABLET | Refills: 3 | Status: SHIPPED | OUTPATIENT
Start: 2017-08-10 | End: 2018-09-10

## 2017-08-10 RX ORDER — TAMSULOSIN HYDROCHLORIDE 0.4 MG/1
CAPSULE ORAL
Qty: 90 CAPSULE | Refills: 3 | Status: SHIPPED | OUTPATIENT
Start: 2017-08-10 | End: 2017-08-10

## 2017-08-10 RX ORDER — LOSARTAN POTASSIUM 25 MG/1
TABLET ORAL
Qty: 90 TABLET | Refills: 3 | Status: SHIPPED | OUTPATIENT
Start: 2017-08-10 | End: 2017-08-10

## 2017-08-10 RX ORDER — PRAVASTATIN SODIUM 40 MG
TABLET ORAL
Qty: 90 TABLET | Refills: 3 | Status: SHIPPED | OUTPATIENT
Start: 2017-08-10 | End: 2017-08-10

## 2017-08-10 RX ORDER — TAMSULOSIN HYDROCHLORIDE 0.4 MG/1
CAPSULE ORAL
Qty: 90 CAPSULE | Refills: 3 | Status: SHIPPED | OUTPATIENT
Start: 2017-08-10 | End: 2018-10-08

## 2017-08-10 RX ORDER — PRAVASTATIN SODIUM 40 MG
TABLET ORAL
Qty: 90 TABLET | Refills: 3 | Status: SHIPPED | OUTPATIENT
Start: 2017-08-10 | End: 2018-09-10

## 2017-08-10 ASSESSMENT — PAIN SCALES - GENERAL: PAINLEVEL: NO PAIN (0)

## 2017-08-10 NOTE — TELEPHONE ENCOUNTER
Reason for Call:  Other prescription    Detailed comments: pt wife calling states Juaquin seen in clinic today with Lee and prescribed 3 medications that were sent to [facility]. Pt wife states they need medications sent to [facility]. Please advise    Phone Number Patient can be reached at: Home number on file [phone] (home)    Best Time: ANY    Can we leave a detailed message on this number? YES    Call taken on 8/10/2017 at 10:27 AM by Isis Noriega

## 2017-08-10 NOTE — MR AVS SNAPSHOT
After Visit Summary   8/10/2017    Juaquin Shea    MRN: [id number]           Patient Information     Date Of Birth          1940        Visit Information        Provider Department      8/10/2017 8:45 AM Timo Howard MD [facility]        Today's Diagnoses     Medicare annual wellness visit, subsequent    -  1    Essential hypertension with goal blood pressure less than 140/90        Benign prostatic hyperplasia with urinary retention        Hyperlipidemia LDL goal <130        Family history of prostate cancer        Special screening for malignant neoplasm of prostate          Care Instructions    We'll let you know your lab results as soon as we can.     Keep working on exercising and getting to a healthy weight.    Let me know if you'd like to see urology about your concerns.    Contact us or return if questions or concerns.     Have a nice day!    Dr. Howard     Preventive Health Recommendations:       Male Ages 65 and over    Yearly exam:             See your health care provider every year in order to  o   Review health changes.   o   Discuss preventive care.    o   Review your medicines if your doctor has prescribed any.    Talk with your health care provider about whether you should have a test to screen for prostate cancer (PSA).    Every 3 years, have a diabetes test (fasting glucose). If you are at risk for diabetes, you should have this test more often.    Every 5 years, have a cholesterol test. Have this test more often if you are at risk for high cholesterol or heart disease.     Every 10 years, have a colonoscopy. Or, have a yearly FIT test (stool test). These exams will check for colon cancer.    Talk to with your health care provider about screening for Abdominal Aortic Aneurysm if you have a family history of AAA or have a history of smoking.  Shots:     Get a flu shot each year.     Get a tetanus shot every 10 years.     Talk to your doctor about your  pneumonia vaccines. There are now two you should receive - Pneumovax (PPSV 23) and Prevnar (PCV 13).    Talk to your doctor about a shingles vaccine.     Talk to your doctor about the hepatitis B vaccine.  Nutrition:     Eat at least 5 servings of fruits and vegetables each day.     Eat whole-grain bread, whole-wheat pasta and brown rice instead of white grains and rice.     Talk to your doctor about Calcium and Vitamin D.   Lifestyle    Exercise for at least 150 minutes a week (30 minutes a day, 5 days a week). This will help you control your weight and prevent disease.     Limit alcohol to one drink per day.     No smoking.     Wear sunscreen to prevent skin cancer.     See your dentist every six months for an exam and cleaning.     See your eye doctor every 1 to 2 years to screen for conditions such as glaucoma, macular degeneration and cataracts.          Follow-ups after your visit        Additional Services     [facility] REFERRAL       Your provider has referred you to Outpatient [facility] Advance Care Planning Facilitator or Serious illness clinic support staff. The facilitator or support staff will contact you to schedule the appointment or for the follow up call    Reason for Referral: on file.                  Who to contact     If you have questions or need follow up information about today's clinic visit or your schedule please contact [facility] directly at [phone].  Normal or non-critical lab and imaging results will be communicated to you by MyChart, letter or phone within 4 business days after the clinic has received the results. If you do not hear from us within 7 days, please contact the clinic through MyChart or phone. If you have a critical or abnormal lab result, we will notify you by phone as soon as possible.  Submit refill requests through [employer] or call your pharmacy and they will forward the refill request to us. Please allow 3 business days for your  "refill to be completed.          Additional Information About Your Visit        Leatt Information     [facility]att lets you send messages to your doctor, view your test results, renew your prescriptions, schedule appointments and more. To sign up, go to [email or web address] . Click on \"Log in\" on the left side of the screen, which will take you to the Welcome page. Then click on \"Sign up Now\" on the right side of the page.     You will be asked to enter the access code listed below, as well as some personal information. Please follow the directions to create your username and password.     Your access code is: [email or web address]  Expires: 10/10/2017  4:20 PM     Your access code will  in 90 days. If you need help or a new code, please call your [facility] clinic or [phone].        Care EveryWhere ID     This is your Care EveryWhere ID. This could be used by other organizations to access your [facility] medical records  [id number]        Your Vitals Were     Pulse Temperature Respirations Height BMI (Body Mass Index)       60 97.8  F (36.6  C) (Temporal) 16 5' 8.25\" (1.734 m) 35.68 kg/m2        Blood Pressure from Last 3 Encounters:   08/10/17 126/68   17 128/70   17 137/77    Weight from Last 3 Encounters:   08/10/17 236 lb 6.4 oz (107.2 kg)   17 236 lb (107 kg)   17 230 lb (104.3 kg)              We Performed the Following     Basic metabolic panel  (Ca, Cl, CO2, Creat, Gluc, K, Na, BUN)     HONORING CHOICES REFERRAL     Lipid Profile (Chol, Trig, HDL, LDL calc)     PSA, screen          Today's Medication Changes          These changes are accurate as of: 8/10/17  9:22 AM.  If you have any questions, ask your nurse or doctor.               These medicines have changed or have updated prescriptions.        Dose/Directions    losartan 25 MG tablet   Commonly known as:  COZAAR   This may have changed:  See the new instructions.   Used for:  Essential hypertension with goal blood pressure " less than 140/90   Changed by:  Timo Howard MD        TAKE 1 TABLET EVERY DAY   Quantity:  90 tablet   Refills:  3       pravastatin 40 MG tablet   Commonly known as:  PRAVACHOL   This may have changed:  See the new instructions.   Used for:  Hyperlipidemia LDL goal <130   Changed by:  Timo Howard MD        TAKE 1 TABLET EVERY DAY   Quantity:  90 tablet   Refills:  3       tamsulosin 0.4 MG capsule   Commonly known as:  FLOMAX   This may have changed:  See the new instructions.   Used for:  Benign prostatic hyperplasia with urinary retention   Changed by:  Timo Howard MD        TAKE 1 CAPSULE EVERY DAY   Quantity:  90 capsule   Refills:  3            Where to get your medicines      These medications were sent to [facility] [address] [place] [address] [address] [facility]  [address] [facility] [address]     Phone:  [phone]     losartan 25 MG tablet    pravastatin 40 MG tablet    tamsulosin 0.4 MG capsule                Primary Care Provider Office Phone # Fax #    Timo oHward [phone] [address] [place] [address]        Equal Access to Services     [facility]: Hadii aad ku hadasho Soomaali, waaxda luqadaha, qaybta kaalmada adeegyada, waxay idiin hayaan maximo bird . So [facility] [phone].    ATENCIÓN: Si habla español, tiene a palomino disposición servicios gratuitos de asistencia lingüística. Llame al [phone].    We comply with applicable federal civil rights laws and [place] laws. We do not discriminate on the basis of race, color, national origin, age, disability sex, sexual orientation or gender identity.            Thank you!     Thank you for choosing [facility]  for your care. Our goal is always to provide you with excellent care. Hearing back from our patients is one way we can continue to improve our services. Please take a few minutes to complete the written survey that you may  receive in the mail after your visit with us. Thank you!             Your Updated Medication List - Protect others around you: Learn how to safely use, store and throw away your medicines at www.disposemymeds.org.          This list is accurate as of: 8/10/17  9:22 AM.  Always use your most recent med list.                   Brand Name Dispense Instructions for use Diagnosis    ACETAMINOPHEN 8 HOUR 650 MG 8 hour tablet   Generic drug:  acetaminophen     100 tablet    Take 650 mg by mouth every 6 hours    Post-operative pain       ascorbic acid 1000 MG Tabs    vitamin C     Take 1 tablet by mouth daily.        aspirin 81 MG tablet      Take 1 tablet by mouth daily.        fish oil-omega-3 fatty acids 1000 MG capsule      Take 1 g by mouth daily.        ibuprofen 200 MG tablet    ADVIL/MOTRIN     Take 600 mg by mouth as needed. Indications: Mild to Moderate Pain        losartan 25 MG tablet    COZAAR    90 tablet    TAKE 1 TABLET EVERY DAY    Essential hypertension with goal blood pressure less than 140/90       pravastatin 40 MG tablet    PRAVACHOL    90 tablet    TAKE 1 TABLET EVERY DAY    Hyperlipidemia LDL goal <130       tamsulosin 0.4 MG capsule    FLOMAX    90 capsule    TAKE 1 CAPSULE EVERY DAY    Benign prostatic hyperplasia with urinary retention

## 2017-08-10 NOTE — NURSING NOTE
"Chief Complaint   Patient presents with     Physical     Panel Management     mychart, fall risk, honoring choices, tobacco, lipid, bmp       Initial /68 (BP Location: Left arm, Patient Position: Chair, Cuff Size: Adult Large)  Pulse 60  Temp 97.8  F (36.6  C) (Temporal)  Resp 16  Ht 5' 8.25\" (1.734 m)  Wt 236 lb 6.4 oz (107.2 kg)  BMI 35.68 kg/m2 Estimated body mass index is 35.68 kg/(m^2) as calculated from the following:    Height as of this encounter: 5' 8.25\" (1.734 m).    Weight as of this encounter: 236 lb 6.4 oz (107.2 kg).  Medication Reconciliation: complete  Jerman Pearson, CMA      "

## 2017-08-11 ENCOUNTER — TELEPHONE (OUTPATIENT)
Dept: FAMILY MEDICINE | Facility: OTHER | Age: 77
End: 2017-08-11

## 2017-08-11 NOTE — LETTER
Juaquin Shea  [address]   [place] [address]        August 14, 2017          Dear ,    We are writing to inform you of your test results.    Enclosed you will find the results you requested.    Results for orders placed or performed in visit on 08/10/17   Lipid Profile (Chol, Trig, HDL, LDL calc)   Result Value Ref Range    Cholesterol 146 <200 mg/dL    Triglycerides 78 <150 mg/dL    HDL Cholesterol 42 >39 mg/dL    LDL Cholesterol Calculated 88 <100 mg/dL    Non HDL Cholesterol 104 <130 mg/dL   Basic metabolic panel  (Ca, Cl, CO2, Creat, Gluc, K, Na, BUN)   Result Value Ref Range    Sodium 144 133 - 144 mmol/L    Potassium 4.4 3.4 - 5.3 mmol/L    Chloride 108 94 - 109 mmol/L    Carbon Dioxide 29 20 - 32 mmol/L    Anion Gap 7 3 - 14 mmol/L    Glucose 109 (H) 70 - 99 mg/dL    Urea Nitrogen 16 7 - 30 mg/dL    Creatinine 0.71 0.66 - 1.25 mg/dL    GFR Estimate >90  Non  GFR Calc   >60 mL/min/1.7m2    GFR Estimate If Black >90   GFR Calc   >60 mL/min/1.7m2    Calcium 9.2 8.5 - 10.1 mg/dL   PSA, screen   Result Value Ref Range    PSA 1.08 0 - 4 ug/L       If you have any questions or concerns, please call the clinic at the number listed above.       Sincerely,        Timo Howard MD, MD

## 2017-08-11 NOTE — PROGRESS NOTES
All of your labs were normal for you except glucose.    Patient's blood sugar is in the prediabetic range.  Can try to prevent this from getting worse by exercising regularly and controlling weight and diet.      Have a nice day!    Dr. Howard

## 2017-08-11 NOTE — TELEPHONE ENCOUNTER
LMTC, please inform patient of results below  Pallavi Simmons RT (R)      All of your labs were normal for you except glucose.    Patient's blood sugar is in the prediabetic range.  Can try to prevent this from getting worse by exercising regularly and controlling weight and diet.      Have a nice day!    Dr. Howard

## 2017-08-14 NOTE — TELEPHONE ENCOUNTER
Spoke to to patient informed of message below. Patient requested print out of results sent by mail to patient.  Amber Rocha CMA ([facility])

## 2018-09-10 DIAGNOSIS — E78.5 HYPERLIPIDEMIA LDL GOAL <130: ICD-10-CM

## 2018-09-10 DIAGNOSIS — I10 ESSENTIAL HYPERTENSION WITH GOAL BLOOD PRESSURE LESS THAN 140/90: ICD-10-CM

## 2018-09-11 RX ORDER — PRAVASTATIN SODIUM 40 MG
TABLET ORAL
Qty: 90 TABLET | Refills: 0 | Status: SHIPPED | OUTPATIENT
Start: 2018-09-11 | End: 2018-10-08

## 2018-09-11 RX ORDER — LOSARTAN POTASSIUM 25 MG/1
TABLET ORAL
Qty: 90 TABLET | Refills: 0 | Status: SHIPPED | OUTPATIENT
Start: 2018-09-11 | End: 2018-10-08

## 2018-09-11 NOTE — TELEPHONE ENCOUNTER
Losartan and Pravastatin    Medication is being filled for 1 time refill only due to:  Patient needs to be seen because it has been more than one year since last visit.     Last physical 8/10/2017    Next 5 appointments (look out 90 days)     Oct 08, 2018  9:45 AM CDT   [facility]    [address] [facility] [address] [phone]                Mary Rayo RN, BSN

## 2018-10-01 NOTE — PROGRESS NOTES
"  SUBJECTIVE:   Juaquin Shea is a 78 year old male who presents to clinic today for the following health issues:  {Provider please address medication reconciliation discrepancies--rooming staff please delete if no med/rec issues}    HPI  {additional problems for roomer to add, delete if none:380027}  Problem list and histories reviewed & adjusted, as indicated.  Additional history: {NONE - AS DOCUMENTED:475084::\"as documented\"}    {ACUTE Problem SUPERLIST - brief histories:464871}    {HIST REVIEW/ LINKS 2:732955}    {PROVIDER CHARTING PREFERENCE:394554}    SUBJECTIVE:   Juaquin Shea is a 78 year old male who presents for Preventive Visit.  {PVP to remind patient that this is not necessarily a physical exam; physical exam may or may not be done:356284::\"click delete button to remove this line now\"}  {PVP to inform patient that additional E&M charge may apply, if additional problems addressed:122473::\"click delete button to remove this line now\"}  Are you in the first 12 months of your Medicare Part B coverage?  {No Yes:589361::\"No\"}    Healthy Habits:    Do you get at least three servings of calcium containing foods daily (dairy, green leafy vegetables, etc.)? {YES/NO, DAIRY INTAKE:514231::\"yes\"}    Amount of exercise or daily activities, outside of work: {AMOUNT EXERCISE:575163}    Problems taking medications regularly {Yes /No default:776017::\"No\"}    Medication side effects: {Yes /No default.:729747::\"No\"}    Have you had an eye exam in the past two years? {YESNOBLANK:919858}    Do you see a dentist twice per year? {YESNOBLANK:387926}    Do you have sleep apnea, excessive snoring or daytime drowsiness?{YESNOBLANK:612549}      Ability to successfully perform activities of daily living: {YES/NO (MEDICARE):536742::\"Yes, no assistance needed\"}    Home safety:  {IPPE SAFETY CONCERNS:108625::\"none identified\"}     Hearing impairment: {NO/YES:589804}    Fall risk:  {Document Fall Risk in the Assessments Section of " "the Navigator:706565}    {If any of the above assessments are answered yes, consider ordering appropriate referrals (Optional):193732::\"click delete button to remove this line now\"}    {AWV Cognitive Screenin}    {Outside tests to abstract? :217414}    {additional problems to add (Optional):729185}    Reviewed and updated as needed this visit by clinical staff         Reviewed and updated as needed this visit by Provider        Social History   Substance Use Topics     Smoking status: Former Smoker     Packs/day: 0.50     Years: 15.00     Quit date: 1981     Smokeless tobacco: Former User     Types: Chew     Quit date: 2/10/2017      Comment: quit in      Alcohol use Yes      Comment: beer occ       If you drink alcohol do you typically have >3 drinks per day or >7 drinks per week? {ETOH :125908}                        Today's PHQ-2 Score:   PHQ-2 (  [employer]) 10/8/2018 10/8/2018   Q1: Little interest or pleasure in doing things 0 0   Q2: Feeling down, depressed or hopeless 0 0   PHQ-2 Score 0 0   Q1: Little interest or pleasure in doing things Not at all -   Q2: Feeling down, depressed or hopeless Not at all -   PHQ-2 Score 0 -     {PHQ-2 LOOK IN ASSESSMENTS (Optional) :582148}  Do you feel safe in your environment - {YES/NO/NA:067576}    Do you have a Health Care Directive?: {HEALTHCARE DIRECTIVE STATUS:111520}    Current providers sharing in care for this patient include:   Patient Care Team:  Timo Howard MD as PCP - General (Family Practice)    The following health maintenance items are reviewed in Epic and correct as of today:  Health Maintenance   Topic Date Due     TOBACCO CESSATION COUNSELING Q1 YR  2017     BMP Q1 YR  08/10/2018     FALL RISK ASSESSMENT  08/10/2018     LIPID MONITORING Q1 YEAR  08/10/2018     PHQ-2 Q1 YR  08/10/2018     INFLUENZA VACCINE (1) 2018     ADVANCE DIRECTIVE PLANNING Q5 YRS  10/08/2023     TETANUS IMMUNIZATION (SYSTEM ASSIGNED)  2027 " "    PNEUMOCOCCAL  Completed     {Chronicprobdata (Optional):935222}    {Decision Support (Optional):474229}    ROS:  {ROS COMP:921695}    OBJECTIVE:   There were no vitals taken for this visit. Estimated body mass index is 35.68 kg/(m^2) as calculated from the following:    Height as of 8/10/17: 5' 8.25\" (1.734 m).    Weight as of 8/10/17: 236 lb 6.4 oz (107.2 kg).  EXAM:   {Exam :172290}    {Diagnostic Test Results (Optional):711822::\"Diagnostic Test Results:\",\"none \"}    ASSESSMENT / PLAN:   {Diag Picklist:472678}    End of Life Planning:  Patient currently has an advanced directive: { :887871}    COUNSELING:  {Medicare Counselin}    BP Readings from Last 1 Encounters:   08/10/17 126/68     Estimated body mass index is 35.68 kg/(m^2) as calculated from the following:    Height as of 8/10/17: 5' 8.25\" (1.734 m).    Weight as of 8/10/17: 236 lb 6.4 oz (107.2 kg).    {BP Counseling- Complete if BP >= 120/80  (Optional):008583}  {Weight Management Plan (ACO) Complete if BMI is abnormal-  Ages 18-64  BMI >24.9.  Age 65+ with BMI <23 or >30 (Optional):248131}     reports that he quit smoking about 37 years ago. He has a 7.50 pack-year smoking history. He quit smokeless tobacco use about 19 months ago. His smokeless tobacco use included Chew.  {Tobacco Cessation -- Complete if patient is a smoker (Optional):739014}    Appropriate preventive services were discussed with this patient, including applicable screening as appropriate for cardiovascular disease, diabetes, osteopenia/osteoporosis, and glaucoma.  As appropriate for age/gender, discussed screening for colorectal cancer, prostate cancer, breast cancer, and cervical cancer. Checklist reviewing preventive services available has been given to the patient.    Reviewed patients plan of care and provided an AVS. The {CarePlan:966118} for Juaquin meets the Care Plan requirement. This Care Plan has been established and reviewed with the {PATIENT, FAMILY MEMBER, " CAREGIVER:266514}.    Counseling Resources:  ATP IV Guidelines  Pooled Cohorts Equation Calculator  Breast Cancer Risk Calculator  FRAX Risk Assessment  ICSI Preventive Guidelines  Dietary Guidelines for Americans, 2010  USDA's MyPlate  ASA Prophylaxis  Lung CA Screening    Timo Howard MD, MD  [facility]

## 2018-10-08 ENCOUNTER — OFFICE VISIT (OUTPATIENT)
Dept: FAMILY MEDICINE | Facility: OTHER | Age: 78
End: 2018-10-08
Payer: COMMERCIAL

## 2018-10-08 VITALS
SYSTOLIC BLOOD PRESSURE: 128 MMHG | DIASTOLIC BLOOD PRESSURE: 76 MMHG | RESPIRATION RATE: 16 BRPM | WEIGHT: 235.8 LBS | BODY MASS INDEX: 35.74 KG/M2 | HEART RATE: 64 BPM | HEIGHT: 68 IN | TEMPERATURE: 97.6 F

## 2018-10-08 DIAGNOSIS — N40.1 BENIGN PROSTATIC HYPERPLASIA WITH URINARY RETENTION: ICD-10-CM

## 2018-10-08 DIAGNOSIS — E78.5 HYPERLIPIDEMIA LDL GOAL <130: ICD-10-CM

## 2018-10-08 DIAGNOSIS — Z00.00 MEDICARE ANNUAL WELLNESS VISIT, SUBSEQUENT: Primary | ICD-10-CM

## 2018-10-08 DIAGNOSIS — Z23 NEED FOR PROPHYLACTIC VACCINATION AND INOCULATION AGAINST INFLUENZA: ICD-10-CM

## 2018-10-08 DIAGNOSIS — R33.8 BENIGN PROSTATIC HYPERPLASIA WITH URINARY RETENTION: ICD-10-CM

## 2018-10-08 DIAGNOSIS — I10 ESSENTIAL HYPERTENSION WITH GOAL BLOOD PRESSURE LESS THAN 140/90: ICD-10-CM

## 2018-10-08 DIAGNOSIS — E66.01 MORBID OBESITY (H): ICD-10-CM

## 2018-10-08 LAB
ALBUMIN SERPL-MCNC: 4.2 G/DL (ref 3.4–5)
ALP SERPL-CCNC: 60 U/L (ref 40–150)
ALT SERPL W P-5'-P-CCNC: 47 U/L (ref 0–70)
ANION GAP SERPL CALCULATED.3IONS-SCNC: 4 MMOL/L (ref 3–14)
AST SERPL W P-5'-P-CCNC: 29 U/L (ref 0–45)
BILIRUB SERPL-MCNC: 0.6 MG/DL (ref 0.2–1.3)
BUN SERPL-MCNC: 16 MG/DL (ref 7–30)
CALCIUM SERPL-MCNC: 9 MG/DL (ref 8.5–10.1)
CHLORIDE SERPL-SCNC: 105 MMOL/L (ref 94–109)
CHOLEST SERPL-MCNC: 140 MG/DL
CO2 SERPL-SCNC: 30 MMOL/L (ref 20–32)
CREAT SERPL-MCNC: 0.79 MG/DL (ref 0.66–1.25)
GFR SERPL CREATININE-BSD FRML MDRD: >90 ML/MIN/1.7M2
GLUCOSE SERPL-MCNC: 108 MG/DL (ref 70–99)
HDLC SERPL-MCNC: 40 MG/DL
LDLC SERPL CALC-MCNC: 74 MG/DL
NONHDLC SERPL-MCNC: 100 MG/DL
POTASSIUM SERPL-SCNC: 4.3 MMOL/L (ref 3.4–5.3)
PROT SERPL-MCNC: 7.8 G/DL (ref 6.8–8.8)
SODIUM SERPL-SCNC: 139 MMOL/L (ref 133–144)
TRIGL SERPL-MCNC: 128 MG/DL

## 2018-10-08 PROCEDURE — 99213 OFFICE O/P EST LOW 20 MIN: CPT | Mod: 25 | Performed by: FAMILY MEDICINE

## 2018-10-08 PROCEDURE — G0008 ADMIN INFLUENZA VIRUS VAC: HCPCS | Performed by: FAMILY MEDICINE

## 2018-10-08 PROCEDURE — 80053 COMPREHEN METABOLIC PANEL: CPT | Performed by: FAMILY MEDICINE

## 2018-10-08 PROCEDURE — 80061 LIPID PANEL: CPT | Performed by: FAMILY MEDICINE

## 2018-10-08 PROCEDURE — 90662 IIV NO PRSV INCREASED AG IM: CPT | Performed by: FAMILY MEDICINE

## 2018-10-08 PROCEDURE — 99397 PER PM REEVAL EST PAT 65+ YR: CPT | Mod: 25 | Performed by: FAMILY MEDICINE

## 2018-10-08 PROCEDURE — 36415 COLL VENOUS BLD VENIPUNCTURE: CPT | Performed by: FAMILY MEDICINE

## 2018-10-08 RX ORDER — PRAVASTATIN SODIUM 40 MG
TABLET ORAL
Qty: 90 TABLET | Refills: 3 | Status: SHIPPED | OUTPATIENT
Start: 2018-10-08 | End: 2019-04-08

## 2018-10-08 RX ORDER — TAMSULOSIN HYDROCHLORIDE 0.4 MG/1
CAPSULE ORAL
Qty: 90 CAPSULE | Refills: 3 | Status: SHIPPED | OUTPATIENT
Start: 2018-10-08 | End: 2019-04-08

## 2018-10-08 RX ORDER — LOSARTAN POTASSIUM 25 MG/1
TABLET ORAL
Qty: 90 TABLET | Refills: 3 | Status: SHIPPED | OUTPATIENT
Start: 2018-10-08 | End: 2019-04-08

## 2018-10-08 ASSESSMENT — PAIN SCALES - GENERAL: PAINLEVEL: MODERATE PAIN (5)

## 2018-10-08 NOTE — PROGRESS NOTES
SUBJECTIVE:   Juaquin Shea is a 78 year old male who presents for Preventive Visit.    Are you in the first 12 months of your Medicare Part B coverage?  No    Healthy Habits:    Do you get at least three servings of calcium containing foods daily (dairy, green leafy vegetables, etc.)? yes    Amount of exercise or daily activities, outside of work: 7 day(s) per week depending on weather    Problems taking medications regularly No    Medication side effects: No    Have you had an eye exam in the past two years? yes    Do you see a dentist twice per year? yes    Do you have sleep apnea, excessive snoring or daytime drowsiness?hard time getting to sleep at night, wakes up every couple hours very seldom to get 3-4 hours straight of sleep    Also has a hard time staying asleep.        Ability to successfully perform activities of daily living: Yes, no assistance needed    Home safety:  none identified     Hearing impairment: Yes, Difficulty following a conversation in a noisy restaurant or crowded room.    Feel that people are mumbling or not speaking clearly.    Difficult to understand a speaker at a public meeting or [employer] service.    Need to ask people to speak up or repeat themselves.    Difficulty understanding soft or whispered speech.    Fall risk:  Fallen 2 or more times in the past year?: No  Any fall with injury in the past year?: No        COGNITIVE SCREEN  1) Repeat 3 items (Leader, Season, Table)      2) Clock draw:   NORMAL  3) 3 item recall:   Recalls NO objects   Results: 0 items recalled: PROBABLE COGNITIVE IMPAIRMENT, **INFORM PROVIDER**    Mini-CogTM Copyright GAMALIEL Reyes. Licensed by the author for use in [facility]; reprinted with permission ([email or web address]). All rights reserved.            Hyperlipidemia Follow-Up      Rate your low fat/cholesterol diet?: fair    Taking statin?  Yes, no muscle aches from statin    Other lipid medications/supplements?:  Fish oil/Omega 3, dose 1000 mg  without side effects    Hypertension Follow-up      Outpatient blood pressures are not being checked.    Low Salt Diet: no added salt      Urination is good with the Flomax.  No problems from this either.    Reviewed and updated as needed this visit by clinical staff  Tobacco  Allergies  Meds  Med Hx  Surg Hx  Fam Hx  Soc Hx        Reviewed and updated as needed this visit by Provider        Social History   Substance Use Topics     Smoking status: Former Smoker     Packs/day: 0.50     Years: 15.00     Quit date: 1/1/1981     Smokeless tobacco: Former User     Types: Chew     Quit date: 2/10/2017      Comment: quit in 1981     Alcohol use Yes      Comment: beer occ       If you drink alcohol do you typically have >3 drinks per day or >7 drinks per week? No                        Today's PHQ-2 Score:   PHQ-2 ( 1999 [employer]) 10/8/2018 10/8/2018   Q1: Little interest or pleasure in doing things 0 0   Q2: Feeling down, depressed or hopeless 0 0   PHQ-2 Score 0 0   Q1: Little interest or pleasure in doing things Not at all -   Q2: Feeling down, depressed or hopeless Not at all -   PHQ-2 Score 0 -       Do you feel safe in your environment - Yes    Do you have a Health Care Directive?: Yes: Advance Directive has been received and scanned.    Current providers sharing in care for this patient include:   Patient Care Team:  Timo Howard MD as PCP - General (Family Practice)    The following health maintenance items are reviewed in Epic and correct as of today:  Health Maintenance   Topic Date Due     TOBACCO CESSATION COUNSELING Q1 YR  08/31/2017     BMP Q1 YR  08/10/2018     FALL RISK ASSESSMENT  08/10/2018     LIPID MONITORING Q1 YEAR  08/10/2018     PHQ-2 Q1 YR  08/10/2018     INFLUENZA VACCINE (1) 09/01/2018     ADVANCE DIRECTIVE PLANNING Q5 YRS  10/08/2023     TETANUS IMMUNIZATION (SYSTEM ASSIGNED)  07/12/2027     PNEUMOCOCCAL  Completed     BP Readings from Last 3 Encounters:   10/08/18 128/76    08/10/17 126/68   07/19/17 128/70    Wt Readings from Last 3 Encounters:   10/08/18 235 lb 12.8 oz (107 kg)   08/10/17 236 lb 6.4 oz (107.2 kg)   07/19/17 236 lb (107 kg)                  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 and other lower urinary tract symptoms (LUTS)     Tinnitus     Microalbuminuria     LVH (left ventricular hypertrophy)     HYPERLIPIDEMIA LDL GOAL <130     Advanced directives, counseling/discussion     S/P total knee arthroplasty     DVT prophylaxis     Obesity (BMI 35.0-39.9) with comorbidity (H)     Past Surgical History:   Procedure Laterality Date     ARTHROPLASTY KNEE  2/20/2012    hemiarthroplasty right knee     ARTHROPLASTY KNEE  9/30/2013    Procedure: ARTHROPLASTY KNEE;  Left Total Knee Arthroplasty;  Surgeon: Jose D Siegel MD;  Location: [facility]     COLONOSCOPY  03/27/07, 06/08/10     COLONOSCOPY N/A 6/16/2015    Procedure: COLONOSCOPY;  Surgeon: Yg Rendon MD;  Location: [facility]     EXCISE MASS HAND  3/29/2013    Procedure: EXCISE MASS HAND;  excision right hand mass;  Surgeon: Rafa Moore MD;  Location: [facility] OR     HC COLONOSCOPY W/WO BRUSH/WASH  2000     HC COLONOSCOPY W/WO BRUSH/WASH  2004     HC REPAIR ING HERNIA,5+Y/O,REDUCIBL      X2     HC REVISE MEDIAN N/CARPAL TUNNEL SURG  5/11/2000    left wrist     HC REVISE MEDIAN N/CARPAL TUNNEL SURG  7/21/1994    right wrist     RELEASE CARPAL TUNNEL  12/8/2011    Procedure:RELEASE CARPAL TUNNEL; right carpal tunnel release, right ring and middle finger A-1 pulley releases    ; Surgeon:BARRETT CURRY; Location:[facility] OR     RELEASE TRIGGER FINGER  12/8/2011    Procedure:RELEASE  TRIGGER FINGER; Surgeon:BARRETT CURRY; Location:[facility] OR       Social History   Substance Use Topics     Smoking status: Former Smoker     Packs/day: 0.50     Years: 15.00     Quit date: 1/1/1981     Smokeless tobacco: Former User     Types: Chew     Quit date: 2/10/2017      Comment: quit in 1981     Alcohol use Yes      Comment: beer occ     Family History   Problem Relation Age of Onset     HEART DISEASE Mother      doctored for heart problems at young age     Prostate Cancer Father      age 70s     Stomach Cancer Brother      Cancer free now for over 10 years (T: 8/10/17)     Prostate Cancer Brother      Other - See Comments Brother      [place] virus fighting for about 6 weeks 10/08/18     Respiratory Brother      NE, uses CPAP     Cancer - colorectal No family hx of          Current Outpatient Prescriptions   Medication Sig Dispense Refill     ascorbic acid (VITAMIN C) 1000 MG TABS Take 1 tablet by mouth daily.       aspirin 81 MG tablet Take 1 tablet by mouth daily.  3     losartan (COZAAR) 25 MG tablet TAKE 1 TABLET EVERY DAY 90 tablet 0     pravastatin (PRAVACHOL) 40 MG tablet TAKE 1 TABLET EVERY DAY 90 tablet 0     tamsulosin (FLOMAX) 0.4 MG capsule TAKE 1 CAPSULE EVERY DAY 90 capsule 3     fish oil-omega-3 fatty acids (FISH OIL) 1000 MG capsule Take 1 g by mouth daily.       Recent Labs   Lab Test  08/10/17   0924  08/31/16   0833  09/24/15   1352  09/29/14   0919  10/01/13   0610   LDL  88  69  89  103   --    HDL  42  35*  38*  41   --    TRIG  78  104  191*  103   --    ALT   --    --   60  36  32   CR  0.71  0.74  0.58*  0.77  0.80   GFRESTIMATED  >90  Non  GFR Calc    >90  Non  GFR Calc    >90  Non  GFR Calc    >90  Non  GFR Calc    >90   GFRESTBLACK  >90   GFR Calc    >90   GFR Calc    >90   GFR Calc    >90   GFR Calc    >90   POTASSIUM  4.4  4.2  4.6  4.2  4.0     "      ROS:  Constitutional, HEENT, cardiovascular, pulmonary, gi and gu systems are negative, except as otherwise noted.  See above.  Does have some urgency of urination    OBJECTIVE:   /76 (BP Location: Right arm, Patient Position: Chair, Cuff Size: Adult Large)  Pulse 64  Temp 97.6  F (36.4  C) (Temporal)  Resp 16  Ht 5' 8.25\" (1.734 m)  Wt 235 lb 12.8 oz (107 kg)  BMI 35.59 kg/m2 Estimated body mass index is 35.59 kg/(m^2) as calculated from the following:    Height as of this encounter: 5' 8.25\" (1.734 m).    Weight as of this encounter: 235 lb 12.8 oz (107 kg).  EXAM:   GENERAL: healthy, alert and no distress  EYES: Eyes grossly normal to inspection, PERRL and conjunctivae and sclerae normal  HENT: ear canals and TM's normal, nose and mouth without ulcers or lesions  NECK: no adenopathy, no asymmetry, masses, or scars and thyroid normal to palpation  RESP: lungs clear to auscultation - no rales, rhonchi or wheezes  CV: regular rate and rhythm, normal S1 S2, no S3 or S4, no murmur, click or rub, no peripheral edema and peripheral pulses strong  ABDOMEN: soft, nontender, no hepatosplenomegaly, no masses and bowel sounds normal   (male): normal male genitalia without lesions or urethral discharge, no hernia  RECTAL: normal sphincter tone, no rectal masses, prostate normal size, smooth, nontender without nodules or masses  MS: no gross musculoskeletal defects noted, no edema  SKIN: no suspicious lesions or rashes  NEURO: Normal strength and tone, mentation intact and speech normal  PSYCH: mentation appears normal, affect normal/bright    Diagnostic Test Results:  Results for orders placed or performed in visit on 08/10/17   Lipid Profile (Chol, Trig, HDL, LDL calc)   Result Value Ref Range    Cholesterol 146 <200 mg/dL    Triglycerides 78 <150 mg/dL    HDL Cholesterol 42 >39 mg/dL    LDL Cholesterol Calculated 88 <100 mg/dL    Non HDL Cholesterol 104 <130 mg/dL   Basic metabolic panel  (Ca, Cl, CO2, " Creat, Gluc, K, Na, BUN)   Result Value Ref Range    Sodium 144 133 - 144 mmol/L    Potassium 4.4 3.4 - 5.3 mmol/L    Chloride 108 94 - 109 mmol/L    Carbon Dioxide 29 20 - 32 mmol/L    Anion Gap 7 3 - 14 mmol/L    Glucose 109 (H) 70 - 99 mg/dL    Urea Nitrogen 16 7 - 30 mg/dL    Creatinine 0.71 0.66 - 1.25 mg/dL    GFR Estimate >90  Non  GFR Calc   >60 mL/min/1.7m2    GFR Estimate If Black >90   GFR Calc   >60 mL/min/1.7m2    Calcium 9.2 8.5 - 10.1 mg/dL   PSA, screen   Result Value Ref Range    PSA 1.08 0 - 4 ug/L       ASSESSMENT / PLAN:   1. Medicare annual wellness visit, subsequent  Reviewed recommended screenings and ordered appropriate testing for pt's risks and per pt's request(s).     2. Essential hypertension with goal blood pressure less than 140/90  Currently controlled.  Will continue current regimen and check labs.  Follow-up in 6-12 months.  - losartan (COZAAR) 25 MG tablet; TAKE 1 TABLET EVERY DAY  Dispense: 90 tablet; Refill: 3  - Comprehensive metabolic panel    3. Hyperlipidemia LDL goal <130  Has been controlled.  Tolerating medication well.  Will continue current regimen and check labs today.  Follow-up in 6-12 months.  - Lipid panel reflex to direct LDL Fasting  - pravastatin (PRAVACHOL) 40 MG tablet; TAKE 1 TABLET EVERY DAY  Dispense: 90 tablet; Refill: 3  - Comprehensive metabolic panel    4. Benign prostatic hyperplasia with urinary retention  Patient reports reasonable control.  Still having some symptoms.  Depending upon difficulties the present patient, we could consider some alternative treatment approaches.  - tamsulosin (FLOMAX) 0.4 MG capsule; TAKE 1 CAPSULE EVERY DAY  Dispense: 90 capsule; Refill: 3    5. Morbid obesity (H)  Encouraged increased activity and decreased caloric intake.  Patient will continue to try to work on this.  Follow-up at next visit in 6 months.    6. Need for prophylactic vaccination and inoculation against  "influenza  Immunized.  - FLU VACCINE, INCREASED ANTIGEN, PRESV FREE, AGE 65+ [71900]  - ADMIN INFLUENZA (For MEDICARE Patients ONLY) []    Portions of this note were completed using Dragon dictation software.  Although reviewed, there may be typographical and other inadvertent errors that remain.           End of Life Planning:  Patient currently has an advanced directive: Yes.  Practitioner is supportive of decision.    COUNSELING:  Reviewed preventive health counseling, as reflected in patient instructions       Regular exercise       Healthy diet/nutrition       Immunizations    Vaccinated for: Influenza             Aspirin Prophylaxsis       Prostate cancer screening       Osteoporosis Prevention/Bone Health       The ASCVD Risk score (Anne HARTMAN Jr, et al., 2013) failed to calculate for the following reasons:    The 2013 ASCVD risk score is only valid for ages 40 to 79    BP Readings from Last 1 Encounters:   10/08/18 128/76     Estimated body mass index is 35.59 kg/(m^2) as calculated from the following:    Height as of this encounter: 5' 8.25\" (1.734 m).    Weight as of this encounter: 235 lb 12.8 oz (107 kg).      Weight management plan: Discussed healthy diet and exercise guidelines and patient will follow up in 6 months in clinic to re-evaluate.     reports that he quit smoking about 37 years ago. He has a 7.50 pack-year smoking history. He quit smokeless tobacco use about 19 months ago. His smokeless tobacco use included Chew.      Appropriate preventive services were discussed with this patient, including applicable screening as appropriate for cardiovascular disease, diabetes, osteopenia/osteoporosis, and glaucoma.  As appropriate for age/gender, discussed screening for colorectal cancer, prostate cancer, breast cancer, and cervical cancer. Checklist reviewing preventive services available has been given to the patient.    Reviewed patients plan of care and provided an AVS. The Basic Care Plan " (routine screening as documented in Health Maintenance) and Intermediate Care Plan ( asthma action plan, low back pain action plan, and migraine action plan) for Juaquin meets the Care Plan requirement. This Care Plan has been established and reviewed with the Patient.    Counseling Resources:  ATP IV Guidelines  Pooled Cohorts Equation Calculator  Breast Cancer Risk Calculator  FRAX Risk Assessment  ICSI Preventive Guidelines  Dietary Guidelines for Americans, 2010  [employer]'s MyPlate  ASA Prophylaxis  Lung CA Screening    Timo Howard MD, MD  [facility]

## 2018-10-08 NOTE — PATIENT INSTRUCTIONS
Let me know if you'd like something for sleep or if your urination isn't adequate.      We'll let you know your lab results as soon as we can.     Contact us or return if questions or concerns.     Have a nice day!    Dr. Howard       Preventive Health Recommendations:   Male Ages 65 and over    Yearly exam:             See your health care provider every year in order to  o   Review health changes.   o   Discuss preventive care.    o   Review your medicines if your doctor has prescribed any.    Talk with your health care provider about whether you should have a test to screen for prostate cancer (PSA).    Every 3 years, have a diabetes test (fasting glucose). If you are at risk for diabetes, you should have this test more often.    Every 5 years, have a cholesterol test. Have this test more often if you are at risk for high cholesterol or heart disease.     Every 10 years, have a colonoscopy. Or, have a yearly FIT test (stool test). These exams will check for colon cancer.    Talk to with your health care provider about screening for Abdominal Aortic Aneurysm if you have a family history of AAA or have a history of smoking.    Shots:     Get a flu shot each year.     Get a tetanus shot every 10 years.     Talk to your doctor about your pneumonia vaccines. There are now two you should receive - Pneumovax (PPSV 23) and Prevnar (PCV 13).     Talk to your pharmacist about a shingles vaccine.     Talk to your doctor about the hepatitis B vaccine.  Nutrition:     Eat at least 5 servings of fruits and vegetables each day.     Eat whole-grain bread, whole-wheat pasta and brown rice instead of white grains and rice.     Get adequate Calcium and Vitamin D.   Lifestyle    Exercise for at least 150 minutes a week (30 minutes a day, 5 days a week). This will help you control your weight and prevent disease.     Limit alcohol to one drink per day.     No smoking.     Wear sunscreen to prevent skin cancer.     See your dentist  every six months for an exam and cleaning.     See your eye doctor every 1 to 2 years to screen for conditions such as glaucoma, macular degeneration, cataracts, etc     Preventive Health Recommendations:   Male Ages 65 and over    Yearly exam:             See your health care provider every year in order to  o   Review health changes.   o   Discuss preventive care.    o   Review your medicines if your doctor has prescribed any.    Talk with your health care provider about whether you should have a test to screen for prostate cancer (PSA).    Every 3 years, have a diabetes test (fasting glucose). If you are at risk for diabetes, you should have this test more often.    Every 5 years, have a cholesterol test. Have this test more often if you are at risk for high cholesterol or heart disease.     Every 10 years, have a colonoscopy. Or, have a yearly FIT test (stool test). These exams will check for colon cancer.    Talk to with your health care provider about screening for Abdominal Aortic Aneurysm if you have a family history of AAA or have a history of smoking.    Shots:     Get a flu shot each year.     Get a tetanus shot every 10 years.     Talk to your doctor about your pneumonia vaccines. There are now two you should receive - Pneumovax (PPSV 23) and Prevnar (PCV 13).     Talk to your pharmacist about a shingles vaccine.     Talk to your doctor about the hepatitis B vaccine.  Nutrition:     Eat at least 5 servings of fruits and vegetables each day.     Eat whole-grain bread, whole-wheat pasta and brown rice instead of white grains and rice.     Get adequate Calcium and Vitamin D.   Lifestyle    Exercise for at least 150 minutes a week (30 minutes a day, 5 days a week). This will help you control your weight and prevent disease.     Limit alcohol to one drink per day.     No smoking.     Wear sunscreen to prevent skin cancer.     See your dentist every six months for an exam and cleaning.     See your eye doctor  every 1 to 2 years to screen for conditions such as glaucoma, macular degeneration, cataracts, etc

## 2018-10-08 NOTE — MR AVS SNAPSHOT
After Visit Summary   10/8/2018    Juaquin Shea    MRN: [id number]           Patient Information     Date Of Birth          1940        Visit Information        Provider Department      10/8/2018 9:45 AM Timo Howard MD [facility]'s Diagnoses Medicare annual wellness visit, subsequent    -  1    Essential hypertension with goal blood pressure less than 140/90        Hyperlipidemia LDL goal <130        Benign prostatic hyperplasia with urinary retention        Morbid obesity (H)        Need for prophylactic vaccination and inoculation against influenza          Care Instructions    Let me know if you'd like something for sleep or if your urination isn't adequate.      We'll let you know your lab results as soon as we can.     Contact us or return if questions or concerns.     Have a nice day!    Dr. Howard       Preventive Health Recommendations:   Male Ages 65 and over    Yearly exam:             See your health care provider every year in order to  o   Review health changes.   o   Discuss preventive care.    o   Review your medicines if your doctor has prescribed any.    Talk with your health care provider about whether you should have a test to screen for prostate cancer (PSA).    Every 3 years, have a diabetes test (fasting glucose). If you are at risk for diabetes, you should have this test more often.    Every 5 years, have a cholesterol test. Have this test more often if you are at risk for high cholesterol or heart disease.     Every 10 years, have a colonoscopy. Or, have a yearly FIT test (stool test). These exams will check for colon cancer.    Talk to with your health care provider about screening for Abdominal Aortic Aneurysm if you have a family history of AAA or have a history of smoking.    Shots:     Get a flu shot each year.     Get a tetanus shot every 10 years.     Talk to your doctor about your pneumonia vaccines. There are now two you  should receive - Pneumovax (PPSV 23) and Prevnar (PCV 13).     Talk to your pharmacist about a shingles vaccine.     Talk to your doctor about the hepatitis B vaccine.  Nutrition:     Eat at least 5 servings of fruits and vegetables each day.     Eat whole-grain bread, whole-wheat pasta and brown rice instead of white grains and rice.     Get adequate Calcium and Vitamin D.   Lifestyle    Exercise for at least 150 minutes a week (30 minutes a day, 5 days a week). This will help you control your weight and prevent disease.     Limit alcohol to one drink per day.     No smoking.     Wear sunscreen to prevent skin cancer.     See your dentist every six months for an exam and cleaning.     See your eye doctor every 1 to 2 years to screen for conditions such as glaucoma, macular degeneration, cataracts, etc     Preventive Health Recommendations:   Male Ages 65 and over    Yearly exam:             See your health care provider every year in order to  o   Review health changes.   o   Discuss preventive care.    o   Review your medicines if your doctor has prescribed any.    Talk with your health care provider about whether you should have a test to screen for prostate cancer (PSA).    Every 3 years, have a diabetes test (fasting glucose). If you are at risk for diabetes, you should have this test more often.    Every 5 years, have a cholesterol test. Have this test more often if you are at risk for high cholesterol or heart disease.     Every 10 years, have a colonoscopy. Or, have a yearly FIT test (stool test). These exams will check for colon cancer.    Talk to with your health care provider about screening for Abdominal Aortic Aneurysm if you have a family history of AAA or have a history of smoking.    Shots:     Get a flu shot each year.     Get a tetanus shot every 10 years.     Talk to your doctor about your pneumonia vaccines. There are now two you should receive - Pneumovax (PPSV 23) and Prevnar (PCV 13).     Talk  "to your pharmacist about a shingles vaccine.     Talk to your doctor about the hepatitis B vaccine.  Nutrition:     Eat at least 5 servings of fruits and vegetables each day.     Eat whole-grain bread, whole-wheat pasta and brown rice instead of white grains and rice.     Get adequate Calcium and Vitamin D.   Lifestyle    Exercise for at least 150 minutes a week (30 minutes a day, 5 days a week). This will help you control your weight and prevent disease.     Limit alcohol to one drink per day.     No smoking.     Wear sunscreen to prevent skin cancer.     See your dentist every six months for an exam and cleaning.     See your eye doctor every 1 to 2 years to screen for conditions such as glaucoma, macular degeneration, cataracts, etc           Follow-ups after your visit        Follow-up notes from your care team     Return in about 6 months (around 4/8/2019) for Recheck.      Who to contact     If you have questions or need follow up information about today's clinic visit or your schedule please contact [facility] directly at [phone].  Normal or non-critical lab and imaging results will be communicated to you by Alectorhart, letter or phone within 4 business days after the clinic has received the results. If you do not hear from us within 7 days, please contact the clinic through Trippingt or phone. If you have a critical or abnormal lab result, we will notify you by phone as soon as possible.  Submit refill requests through GreenItaly1 or call your pharmacy and they will forward the refill request to us. Please allow 3 business days for your refill to be completed.          Additional Information About Your Visit        GreenItaly[employer] Information     GreenIt[employer] lets you send messages to your doctor, view your test results, renew your prescriptions, schedule appointments and more. To sign up, go to [email or web address] . Click on \"Log in\" on the left side of the screen, which will take you to the Welcome " "page. Then click on \"Sign up Now\" on the right side of the page.     You will be asked to enter the access code listed below, as well as some personal information. Please follow the directions to create your username and password.     Your access code is: [email or web address]  Expires: 2019  9:59 AM     Your access code will  in 90 days. If you need help or a new code, please call your [facility] or [phone].        Care EveryWhere ID     This is your Care EveryWhere ID. This could be used by other organizations to access your [facility] medical records  [id number]        Your Vitals Were     Pulse Temperature Respirations Height BMI (Body Mass Index)       64 97.6  F (36.4  C) (Temporal) 16 5' 8.25\" (1.734 m) 35.59 kg/m2        Blood Pressure from Last 3 Encounters:   10/08/18 128/76   08/10/17 126/68   17 128/70    Weight from Last 3 Encounters:   10/08/18 235 lb 12.8 oz (107 kg)   08/10/17 236 lb 6.4 oz (107.2 kg)   17 236 lb (107 kg)              We Performed the Following     Comprehensive metabolic panel     Lipid panel reflex to direct LDL Fasting     TOBACCO CESSATION ORDER FOR HM          Today's Medication Changes          These changes are accurate as of 10/8/18  9:59 AM.  If you have any questions, ask your nurse or doctor.               Stop taking these medicines if you haven't already. Please contact your care team if you have questions.     ACETAMINOPHEN 8 HOUR 650 MG 8 hour tablet   Generic drug:  acetaminophen   Stopped by:  Timo Howard MD           ibuprofen 200 MG tablet   Commonly known as:  ADVIL/MOTRIN   Stopped by:  Timo Howard MD                Where to get your medicines      These medications were sent to [facility] - [place], [place] - [address]  [address], [facility] [address]     Phone:  [phone]     losartan 25 MG tablet    pravastatin 40 MG tablet    tamsulosin 0.4 MG capsule                Primary Care " Provider Office Phone # Fax #    Timo Los Howard [phone] [address] [place] [address]        Equal Access to Services     DANYELLE ANDERSEN : Hadethel walton dylanbrody Soelana, waaxda luqadaha, qaybta kaalmada rey, rukhsana hyatt pearlkateh luciano lachasitysergei rony. So [facility] [phone].    ATENCIÓN: Si habla español, tiene a palomino disposición servicios gratuitos de asistencia lingüística. Llame al [phone].    We comply with applicable federal civil rights laws and [place] laws. We do not discriminate on the basis of race, color, national origin, age, disability, sex, sexual orientation, or gender identity.            Thank you!     Thank you for choosing [facility]  for your care. Our goal is always to provide you with excellent care. Hearing back from our patients is one way we can continue to improve our services. Please take a few minutes to complete the written survey that you may receive in the mail after your visit with us. Thank you!             Your Updated Medication List - Protect others around you: Learn how to safely use, store and throw away your medicines at www.disposemymeds.org.          This list is accurate as of 10/8/18  9:59 AM.  Always use your most recent med list.                   Brand Name Dispense Instructions for use Diagnosis    ascorbic acid 1000 MG Tabs    vitamin C     Take 1 tablet by mouth daily.        aspirin 81 MG tablet      Take 1 tablet by mouth daily.        fish oil-omega-3 fatty acids 1000 MG capsule      Take 1 g by mouth daily.        losartan 25 MG tablet    COZAAR    90 tablet    TAKE 1 TABLET EVERY DAY    Essential hypertension with goal blood pressure less than 140/90       pravastatin 40 MG tablet    PRAVACHOL    90 tablet    TAKE 1 TABLET EVERY DAY    Hyperlipidemia LDL goal <130       tamsulosin 0.4 MG capsule    FLOMAX    90 capsule    TAKE 1 CAPSULE EVERY DAY    Benign prostatic hyperplasia with urinary retention

## 2018-10-08 NOTE — PROGRESS NOTES
Injectable Influenza Immunization Documentation    1.  Is the person to be vaccinated sick today?   No    2. Does the person to be vaccinated have an allergy to a component   of the vaccine?   No  Egg Allergy Algorithm Link    3. Has the person to be vaccinated ever had a serious reaction   to influenza vaccine in the past?   No    4. Has the person to be vaccinated ever had Guillain-Barré syndrome?   No    Form completed by Jerman Pearson CMA  Prior to injection verified patient identity using patient's name and date of birth.  Due to injection administration, patient instructed to remain in clinic for 15 minutes  afterwards, and to report any adverse reaction to me immediately.

## 2018-10-09 ENCOUNTER — TELEPHONE (OUTPATIENT)
Dept: FAMILY MEDICINE | Facility: OTHER | Age: 78
End: 2018-10-09

## 2018-10-09 NOTE — TELEPHONE ENCOUNTER
Left message with patient's Wife to call back to clinic.  Please inform of message below.    Notes Recorded by Timo Howard MD on 10/8/2018 at 8:08 PM  All of your labs were normal for you except your glucose.  Patient's blood sugar is in the prediabetic range.  Can try to prevent this from getting worse by exercising regularly and controlling weight and diet.    Have a nice day!    Dr. Lee Garcia, [facility]  October 9, 2018

## 2018-10-09 NOTE — PROGRESS NOTES
All of your labs were normal for you except your glucose.  Patient's blood sugar is in the prediabetic range.  Can try to prevent this from getting worse by exercising regularly and controlling weight and diet.    Have a nice day!    Dr. Howard

## 2019-04-01 NOTE — PROGRESS NOTES
SUBJECTIVE:   Juaquin Shea is a 78 year old male who presents to clinic today for the following health issues:      History of Present Illness     Hyperlipidemia:     Low fat/chol diet rating::  Not monitoring fat    Taking Statins::  YES    Side effects from hypolipidemia medication::  No muscle aches from Statin    Lipid Medications or Supplements::  None    Hypertension:     Outpatient blood pressures:  Are being checked    Blood pressures checked at:  Home    Dietary sodium intake::  Not monitoring salt intake  Frequency of exercise:  2-3 days/week  Duration of exercise:  15-30 minutes  Taking medications regularly:  Yes  Medication side effects:  None  Additional concerns today:  No    BP has been controlled.  Denies side effects.      Occasional leg cramps.      Question as to whether he still needs amoxicillin prior to dental procedures.  Had a knee replacement 5 years ago.      Urination is pretty good with the Flomax.  Does still have some urgency at times.    Some occasional nocturia.      Overall, happy with the medication.    Problem list and histories reviewed & adjusted, as indicated.  Additional history: as documented      Current Outpatient Medications   Medication Sig Dispense Refill     Ascorbic Acid (VITAMIN C ER PO)        ascorbic acid (VITAMIN C) 1000 MG TABS Take 1 tablet by mouth daily.       aspirin 81 MG tablet Take 1 tablet by mouth daily.  3     losartan (COZAAR) 25 MG tablet TAKE 1 TABLET EVERY DAY 90 tablet 3     Multiple Vitamins-Minerals (MULTIVITAMIN ADULT PO)        pravastatin (PRAVACHOL) 40 MG tablet TAKE 1 TABLET EVERY DAY 90 tablet 3     tamsulosin (FLOMAX) 0.4 MG capsule TAKE 1 CAPSULE EVERY DAY 90 capsule 3     fish oil-omega-3 fatty acids (FISH OIL) 1000 MG capsule Take 1 g by mouth daily.       Recent Labs   Lab Test 10/08/18  1006 08/10/17  0924 08/31/16  0833 09/24/15  1352 09/29/14  0919   LDL 74 88 69 89 103   HDL 40 42 35* 38* 41   TRIG 128 78 104 191* 103   ALT 47   "--   --  60 36   CR 0.79 0.71 0.74 0.58* 0.77   GFRESTIMATED >90 >90  Non  GFR Calc   >90  Non  GFR Calc   >90  Non  GFR Calc   >90  Non  GFR Calc     GFRESTBLACK >90 >90   GFR Calc   >90   GFR Calc   >90   GFR Calc   >90   GFR Calc     POTASSIUM 4.3 4.4 4.2 4.6 4.2      BP Readings from Last 3 Encounters:   04/08/19 120/74   10/08/18 128/76   08/10/17 126/68    Wt Readings from Last 3 Encounters:   04/08/19 109.3 kg (241 lb)   10/08/18 107 kg (235 lb 12.8 oz)   08/10/17 107.2 kg (236 lb 6.4 oz)                 ROS:  Constitutional, HEENT, cardiovascular, pulmonary, gi and gu systems are negative, except as otherwise noted.    OBJECTIVE:     /74   Pulse 64   Temp 97.3  F (36.3  C) (Temporal)   Resp 16   Ht 1.734 m (5' 8.25\")   Wt 109.3 kg (241 lb)   SpO2 97%   BMI 36.38 kg/m    Body mass index is 36.38 kg/m .  GENERAL: healthy, alert and no distress  NECK: no adenopathy, no asymmetry, masses, or scars and thyroid normal to palpation  RESP: lungs clear to auscultation - no rales, rhonchi or wheezes  CV: regular rate and rhythm, normal S1 S2, no S3 or S4, no murmur, click or rub, no peripheral edema and peripheral pulses strong  ABDOMEN: soft, nontender, no hepatosplenomegaly, no masses and bowel sounds normal  MS: no gross musculoskeletal defects noted, no edema  SKIN: lipoma on right upper back.    Diagnostic Test Results:  Results for orders placed or performed in visit on 10/08/18   Lipid panel reflex to direct LDL Fasting   Result Value Ref Range    Cholesterol 140 <200 mg/dL    Triglycerides 128 <150 mg/dL    HDL Cholesterol 40 >39 mg/dL    LDL Cholesterol Calculated 74 <100 mg/dL    Non HDL Cholesterol 100 <130 mg/dL   Comprehensive metabolic panel   Result Value Ref Range    Sodium 139 133 - 144 mmol/L    Potassium 4.3 3.4 - 5.3 mmol/L    Chloride 105 94 - 109 mmol/L    " "Carbon Dioxide 30 20 - 32 mmol/L    Anion Gap 4 3 - 14 mmol/L    Glucose 108 (H) 70 - 99 mg/dL    Urea Nitrogen 16 7 - 30 mg/dL    Creatinine 0.79 0.66 - 1.25 mg/dL    GFR Estimate >90 >60 mL/min/1.7m2    GFR Estimate If Black >90 >60 mL/min/1.7m2    Calcium 9.0 8.5 - 10.1 mg/dL    Bilirubin Total 0.6 0.2 - 1.3 mg/dL    Albumin 4.2 3.4 - 5.0 g/dL    Protein Total 7.8 6.8 - 8.8 g/dL    Alkaline Phosphatase 60 40 - 150 U/L    ALT 47 0 - 70 U/L    AST 29 0 - 45 U/L       ASSESSMENT/PLAN:     BMI:   Estimated body mass index is 36.38 kg/m  as calculated from the following:    Height as of this encounter: 1.734 m (5' 8.25\").    Weight as of this encounter: 109.3 kg (241 lb).   Weight management plan: Discussed healthy diet and exercise guidelines        ICD-10-CM    1. Essential hypertension with goal blood pressure less than 140/90 I10 losartan (COZAAR) 25 MG tablet   2. Hyperlipidemia LDL goal <130 E78.5 pravastatin (PRAVACHOL) 40 MG tablet   3. Benign prostatic hyperplasia with urinary retention N40.1 tamsulosin (FLOMAX) 0.4 MG capsule    R33.8    4. Obesity (BMI 35.0-39.9) with comorbidity (H) E66.01      1.  Currently controlled.  Will continue current regimen.  He did have a long discussion about will start in current recalls on various therapies.  If this is recall that we cannot find an alternative, we likely could consider lisinopril as he has not previously failed this in the past.  2.  Currently controlled.  Will continue pravastatin.  3.  Reasonable symptom control.  Will continue Flomax at current dosing for now.  4.  Encouraged lifestyle modifications.  We will continue to follow and assist as able.    Portions of this note were completed using Dragon dictation software.  Although reviewed, there may be typographical and other inadvertent errors that remain.           Patient Instructions   Thank you for visiting [facility]    Keep up the good work!    If your losartan gets recalled, we'd " "probably recommend lisinopril instead.    Contact us or return if questions or concerns.     Have a nice day!    Dr. Howard     Please Follow Up when indicated on the section below this one on your After-Visit Summary.      If you had imaging scheduled please refer to your radiology prep sheet.    Appointment    Date_______________     Time_____________    Day:   M TU W TH F    With____________________________    Location_________________________    If you need medication refills, please contact your pharmacy 3 days before your prescriptions runs out or download the [facility] kell for your smart phone.   If you are out of refills, your pharmacy will contact contact the clinic.    Contact us or return if questions or concerns.     -Your [facility] Care Team:    MD Carie Sharif PA-C Joel De Haan, PA-C Anna Niesen, PA-C Elizabeth \"Renay\" Anne APRMAGALYS Irby APRN, MARGA Downing APRN, CNP  Hawk Arora, RN, BSN       General information about your   [facility]      Clinic hours:     Lab hours:  Phone [phone]  Monday 7:30 am-7 pm    Monday 8:30 am-6:30 pm  Tuesday-Friday 7:30 am-5 pm   Tuesday-Friday 8:30 am-4:30 pm    Pharmacy hours:  Phone [phone]  Monday 8:30 am-7pm  Tuesday-Friday 8:30am-6 pm                                     Mychart assistance [phone]    We would like to hear from you, how was your visit today?    Isabela Agosto  Patient Information Supervisor   Patient Care Supervisor  [facility], and [facility], and [facility]  [phone]          Timo Howard MD, MD  [facility]"

## 2019-04-08 ENCOUNTER — OFFICE VISIT (OUTPATIENT)
Dept: FAMILY MEDICINE | Facility: OTHER | Age: 79
End: 2019-04-08
Payer: COMMERCIAL

## 2019-04-08 VITALS
BODY MASS INDEX: 36.53 KG/M2 | OXYGEN SATURATION: 97 % | RESPIRATION RATE: 16 BRPM | HEIGHT: 68 IN | WEIGHT: 241 LBS | TEMPERATURE: 97.3 F | HEART RATE: 64 BPM | SYSTOLIC BLOOD PRESSURE: 120 MMHG | DIASTOLIC BLOOD PRESSURE: 74 MMHG

## 2019-04-08 DIAGNOSIS — I10 ESSENTIAL HYPERTENSION WITH GOAL BLOOD PRESSURE LESS THAN 140/90: Primary | ICD-10-CM

## 2019-04-08 DIAGNOSIS — E78.5 HYPERLIPIDEMIA LDL GOAL <130: ICD-10-CM

## 2019-04-08 DIAGNOSIS — E66.01 MORBID OBESITY (H): ICD-10-CM

## 2019-04-08 DIAGNOSIS — R33.8 BENIGN PROSTATIC HYPERPLASIA WITH URINARY RETENTION: ICD-10-CM

## 2019-04-08 DIAGNOSIS — N40.1 BENIGN PROSTATIC HYPERPLASIA WITH URINARY RETENTION: ICD-10-CM

## 2019-04-08 PROCEDURE — 99214 OFFICE O/P EST MOD 30 MIN: CPT | Performed by: FAMILY MEDICINE

## 2019-04-08 RX ORDER — LOSARTAN POTASSIUM 25 MG/1
TABLET ORAL
Qty: 90 TABLET | Refills: 3 | Status: SHIPPED | OUTPATIENT
Start: 2019-04-08 | End: 2020-04-06

## 2019-04-08 RX ORDER — TAMSULOSIN HYDROCHLORIDE 0.4 MG/1
CAPSULE ORAL
Qty: 90 CAPSULE | Refills: 3 | Status: SHIPPED | OUTPATIENT
Start: 2019-04-08 | End: 2020-04-06

## 2019-04-08 RX ORDER — AMOXICILLIN 500 MG/1
500 CAPSULE ORAL PRN
COMMUNITY
Start: 2018-10-17 | End: 2019-04-08

## 2019-04-08 RX ORDER — PRAVASTATIN SODIUM 40 MG
TABLET ORAL
Qty: 90 TABLET | Refills: 3 | Status: SHIPPED | OUTPATIENT
Start: 2019-04-08 | End: 2020-04-06

## 2019-04-08 ASSESSMENT — MIFFLIN-ST. JEOR: SCORE: 1781.64

## 2019-04-08 ASSESSMENT — PAIN SCALES - GENERAL: PAINLEVEL: MILD PAIN (2)

## 2019-04-08 NOTE — PATIENT INSTRUCTIONS
"Thank you for visiting [facility]    Keep up the good work!    If your losartan gets recalled, we'd probably recommend lisinopril instead.    Contact us or return if questions or concerns.     Have a nice day!    Dr. Howard     Please Follow Up when indicated on the section below this one on your After-Visit Summary.      If you had imaging scheduled please refer to your radiology prep sheet.    Appointment    Date_______________     Time_____________    Day:   M TU W TH F    With____________________________    Location_________________________    If you need medication refills, please contact your pharmacy 3 days before your prescriptions runs out or download the [facility] kell for your smart phone.   If you are out of refills, your pharmacy will contact contact the clinic.    Contact us or return if questions or concerns.     -Your [facility] Care Team:    MD Carie Sharif PA-C Joel De Haan, PA-C Anna Niesen, PA-C Elizabeth \"Renay\" YOLI Rodríguez CNP, APRN, YOLI Ryder, MARGA Arora, RN, BSN       General information about your   [facility]      Clinic hours:     Lab hours:  Phone [phone]  Monday 7:30 am-7 pm    Monday 8:30 am-6:30 pm  Tuesday-Friday 7:30 am-5 pm   Tuesday-Friday 8:30 am-4:30 pm    Pharmacy hours:  Phone [phone]  Monday 8:30 am-7pm  Tuesday-Friday 8:30am-6 pm                                     Mychart assistance [phone]    We would like to hear from you, how was your visit today?    Isabela Agosto  Patient Information Supervisor   Patient Care Supervisor  [facility], and [facility], and [facility]  [phone]      "

## 2019-10-02 NOTE — PATIENT INSTRUCTIONS
Be sure to increase your fiber and water intake to help with your bowels.    Let me know if you'd like to see ortho for your hand.  If your lump on your back becomes more problematic, we can also remove that.      Double check your medication list to be sure it's accurate.    We'll let you know your lab results as soon as we can.     Contact us or return if questions or concerns.     Have a nice day!    Dr. Howard       Patient Education   Personalized Prevention Plan  You are due for the preventive services outlined below.  Your care team is available to assist you in scheduling these services.  If you have already completed any of these items, please share that information with your care team to update in your medical record.  Health Maintenance Due   Topic Date Due     Zoster (Shingles) Vaccine (2 of 3) 01/20/2015     Flu Vaccine (1) 09/01/2019     Talk to your care team about options to quit tobacco use.  10/08/2019     Annual Wellness Visit  10/08/2019     Basic Metabolic Panel  10/08/2019     Cholesterol Lab  10/08/2019     FALL RISK ASSESSMENT  10/08/2019

## 2019-10-02 NOTE — PROGRESS NOTES
"SUBJECTIVE:   Juaquin Shea is a 79 year old male who presents for Preventive Visit.      Are you in the first 12 months of your Medicare coverage?  No    Healthy Habits:    In general, how would you rate your overall health?  Very good    Frequency of exercise:  None    Duration of exercise:  Other    Do you usually eat at least 4 servings of fruit and vegetables a day, include whole grains    & fiber and avoid regularly eating high fat or \"junk\" foods?  Yes    Taking medications regularly:  Yes    Barriers to taking medications:  Not applicable    Medication side effects:  None    Ability to successfully perform activities of daily living:  No assistance needed    Home Safety:  No safety concerns identified    Hearing Impairment:  Difficulty understanding soft or whispered speech and difficulty following a conversation in a noisy restaurant or crowded room    In the past 6 months, have you been bothered by leaking of urine?  No    In general, how would you rate your overall mental or emotional health?  Very good      PHQ-2 Total Score:    Additional concerns today:  Yes    Do you feel safe in your environment? Yes    Do you have a Health Care Directive? Yes: Advance Directive has been received and scanned.    Would like to have his ears cleaned out.  Things sound like there's something in there affectin vashti hearing.    BP has been good.  Taking his cholesterol medication without difficulty.  Urination is still pretty good, responding to the Flomax.      Notes a lump in his right hand.    Fall risk  Fallen 2 or more times in the past year?: No  Any fall with injury in the past year?: No    Cognitive Screening   1) Repeat 3 items (Leader, Season, Table)    2) Clock draw: NORMAL  3) 3 item recall: Recalls 3 objects  Results: NORMAL clock, 1-2 items recalled: COGNITIVE IMPAIRMENT LESS LIKELY    Mini-CogTM Copyright GAMALIEL Reyes. Licensed by the author for use in [facility]; reprinted with permission " (philipp@[facility]). All rights reserved.      Do you have sleep apnea, excessive snoring or daytime drowsiness?: no    Reviewed and updated as needed this visit by clinical staff  Tobacco  Allergies  Meds         Reviewed and updated as needed this visit by Provider        Social History     Tobacco Use     Smoking status: Former Smoker     Packs/day: 0.50     Years: 15.00     Pack years: 7.50     Last attempt to quit: 1981     Years since quittin.7     Smokeless tobacco: Former User     Types: Chew     Quit date: 2/10/2017     Tobacco comment: quit in    Substance Use Topics     Alcohol use: Yes     Comment: beer occ         Alcohol Use 8/10/2017   Prescreen: >3 drinks/day or >7 drinks/week? The patient does not drink >3 drinks per day nor >7 drinks per week.             Current providers sharing in care for this patient include:   Patient Care Team:  Timo Howard MD as PCP - General (Family Practice)  Timo Howard MD as Assigned PCP    The following health maintenance items are reviewed in Epic and correct as of today:  Health Maintenance   Topic Date Due     ZOSTER IMMUNIZATION (2 of 3) 2015     INFLUENZA VACCINE (1) 2019     BMP  10/08/2019     LIPID  10/08/2019     FALL RISK ASSESSMENT  10/08/2019     TOBACCO CESSATION COUNSELING  10/08/2019     MEDICARE ANNUAL WELLNESS VISIT  10/08/2019     ADVANCE CARE PLANNING  10/08/2023     DTAP/TDAP/TD IMMUNIZATION (4 - Td) 2027     PHQ-2  Completed     PNEUMOCOCCAL IMMUNIZATION 65+ LOW/MEDIUM RISK  Completed     IPV IMMUNIZATION  Aged Out     MENINGITIS IMMUNIZATION  Aged Out     BP Readings from Last 3 Encounters:   10/09/19 130/72   19 120/74   10/08/18 128/76    Wt Readings from Last 3 Encounters:   10/09/19 106.6 kg (235 lb)   19 109.3 kg (241 lb)   10/08/18 107 kg (235 lb 12.8 oz)                  Patient Active Problem List   Diagnosis     Impotence of organic origin     Obesity     Hyperlipidemia      Impaired fasting glucose     Spinal stenosis, lumbar region, without neurogenic claudication     History of colonic polyps     Family history of prostate cancer     Hypertension goal BP (blood pressure) < 140/90     Unspecified hyperplasia of prostate with urinary obstruction and other lower urinary tract symptoms (LUTS)     Tinnitus     Microalbuminuria     LVH (left ventricular hypertrophy)     HYPERLIPIDEMIA LDL GOAL <130     Advanced directives, counseling/discussion     S/P total knee arthroplasty     DVT prophylaxis     Obesity (BMI 35.0-39.9) with comorbidity (H)     Past Surgical History:   Procedure Laterality Date     ARTHROPLASTY KNEE  2012    hemiarthroplasty right knee     ARTHROPLASTY KNEE  2013    Procedure: ARTHROPLASTY KNEE;  Left Total Knee Arthroplasty;  Surgeon: Jose D Siegel MD;  Location: [facility] OR     COLONOSCOPY  07, 06/08/10     COLONOSCOPY N/A 2015    Procedure: COLONOSCOPY;  Surgeon: Yg Rendon MD;  Location: [facility] GI     EXCISE MASS HAND  3/29/2013    Procedure: EXCISE MASS HAND;  excision right hand mass;  Surgeon: Rafa Moore MD;  Location: [facility] OR     HC COLONOSCOPY W/WO BRUSH/WASH       HC COLONOSCOPY W/WO BRUSH/WASH       HC REPAIR ING HERNIA,5+Y/O,REDUCIBL      X2     HC REVISE MEDIAN N/CARPAL TUNNEL SURG  2000    left wrist     HC REVISE MEDIAN N/CARPAL TUNNEL SURG  1994    right wrist     RELEASE CARPAL TUNNEL  2011    Procedure:RELEASE CARPAL TUNNEL; right carpal tunnel release, right ring and middle finger A-1 pulley releases    ; Surgeon:BARRETT CURRY; Location:[facility] OR     RELEASE TRIGGER FINGER  2011    Procedure:RELEASE TRIGGER FINGER; Surgeon:BARRETT CURRY; Location:[facility] OR       Social History     Tobacco Use     Smoking status: Former Smoker     Packs/day: 0.50     Years: 15.00     Pack years: 7.50     Last attempt to quit: 1981     Years since quittin.7     Smokeless tobacco: Former User     Types:  "Chew     Quit date: 2/10/2017     Tobacco comment: quit in 1981   Substance Use Topics     Alcohol use: Yes     Comment: beer occ     Family History   Problem Relation Age of Onset     Heart Disease Mother         doctored for heart problems at young age     Prostate Cancer Father         age 70s     Stomach Cancer Brother         Cancer free now for over 10 years (T: 8/10/17)     Prostate Cancer Brother      Other - See Comments Brother         [place] virus fighting for about 6 weeks 10/08/18     Respiratory Brother         NE, uses CPAP     Cancer - colorectal No family hx of          Current Outpatient Medications   Medication Sig Dispense Refill     ascorbic acid (VITAMIN C) 1000 MG TABS Take 1 tablet by mouth daily.       losartan (COZAAR) 25 MG tablet TAKE 1 TABLET EVERY DAY 90 tablet 3     Multiple Vitamins-Minerals (MULTIVITAMIN ADULT PO)        pravastatin (PRAVACHOL) 40 MG tablet TAKE 1 TABLET EVERY DAY 90 tablet 3     aspirin 81 MG tablet Take 1 tablet by mouth daily.  3     tamsulosin (FLOMAX) 0.4 MG capsule TAKE 1 CAPSULE EVERY DAY 90 capsule 3     Allergies   Allergen Reactions     No Known Drug Allergies      Recent Labs   Lab Test 10/08/18  1006 08/10/17  0924 08/31/16  0833 09/24/15  1352 09/29/14  0919   LDL 74 88 69 89 103   HDL 40 42 35* 38* 41   TRIG 128 78 104 191* 103   ALT 47  --   --  60 36   CR 0.79 0.71 0.74 0.58* 0.77   GFRESTIMATED >90 >90  Non  GFR Calc   >90  Non  GFR Calc   >90  Non  GFR Calc   >90  Non  GFR Calc     GFRESTBLACK >90 >90   GFR Calc   >90   GFR Calc   >90   GFR Calc   >90   GFR Calc     POTASSIUM 4.3 4.4 4.2 4.6 4.2          Review of Systems  Constitutional, HEENT, cardiovascular, pulmonary, gi and gu systems are negative, except as otherwise noted.    OBJECTIVE:   /72   Pulse 59   Temp 98  F (36.7  C)   Ht 1.702 m (5' 7\")   Wt " "106.6 kg (235 lb)   SpO2 96%   BMI 36.81 kg/m   Estimated body mass index is 36.81 kg/m  as calculated from the following:    Height as of this encounter: 1.702 m (5' 7\").    Weight as of this encounter: 106.6 kg (235 lb).  Physical Exam  GENERAL: healthy, alert and no distress  EYES: Eyes grossly normal to inspection, PERRL and conjunctivae and sclerae normal  HENT: ear canals and TM's normal, nose and mouth without ulcers or lesions  NECK: no adenopathy, no asymmetry, masses, or scars and thyroid normal to palpation  RESP: lungs clear to auscultation - no rales, rhonchi or wheezes  CV: regular rate and rhythm, normal S1 S2, no S3 or S4, no murmur, click or rub, no peripheral edema and peripheral pulses strong  ABDOMEN: soft, nontender, no hepatosplenomegaly, no masses and bowel sounds normal  MS: Dupuytren contracture in right hand, mild  SKIN: lipomatous mass on right upper back.  NEURO: Normal strength and tone, mentation intact and speech normal  PSYCH: mentation appears normal, affect normal/bright    Diagnostic Test Results:  Labs reviewed in Epic  Results for orders placed or performed in visit on 10/08/18   Lipid panel reflex to direct LDL Fasting   Result Value Ref Range    Cholesterol 140 <200 mg/dL    Triglycerides 128 <150 mg/dL    HDL Cholesterol 40 >39 mg/dL    LDL Cholesterol Calculated 74 <100 mg/dL    Non HDL Cholesterol 100 <130 mg/dL   Comprehensive metabolic panel   Result Value Ref Range    Sodium 139 133 - 144 mmol/L    Potassium 4.3 3.4 - 5.3 mmol/L    Chloride 105 94 - 109 mmol/L    Carbon Dioxide 30 20 - 32 mmol/L    Anion Gap 4 3 - 14 mmol/L    Glucose 108 (H) 70 - 99 mg/dL    Urea Nitrogen 16 7 - 30 mg/dL    Creatinine 0.79 0.66 - 1.25 mg/dL    GFR Estimate >90 >60 mL/min/1.7m2    GFR Estimate If Black >90 >60 mL/min/1.7m2    Calcium 9.0 8.5 - 10.1 mg/dL    Bilirubin Total 0.6 0.2 - 1.3 mg/dL    Albumin 4.2 3.4 - 5.0 g/dL    Protein Total 7.8 6.8 - 8.8 g/dL    Alkaline Phosphatase 60 40 " - 150 U/L    ALT 47 0 - 70 U/L    AST 29 0 - 45 U/L       ASSESSMENT / PLAN:       ICD-10-CM    1. Encounter for Medicare annual wellness exam Z00.00 HC FLU VACCINE, INCREASED ANTIGEN, PRESV FREE [34919]   2. Hyperlipidemia LDL goal <130 E78.5 Lipid panel reflex to direct LDL Fasting     Comprehensive metabolic panel   3. Essential hypertension with goal blood pressure less than 140/90 I10 Comprehensive metabolic panel   4. Obesity (BMI 35.0-39.9) with comorbidity (H) E66.01    5. Benign prostatic hyperplasia with urinary retention N40.1     R33.8    6. Dupuytren contracture M72.0      1.  Reviewed recommended screenings and ordered appropriate testing for pt's risks and per pt's request(s).   2.  Currently controlled.  Will continue current pravastatin dose and check monitoring labs today.  3.  Under good control.  Will continue losartan and check monitoring labs today.  4.  Encouraged efforts at lifestyle modifications.  We will continue to follow and assist with this as able.  5.  Symptoms are currently controlled.  Will continue Flomax.  6.  Discussed the nature of this and options for treatment.  Patient would like to pursue gentle stretches with plans to follow-up with orthopedics when if clinically worsens.  We will continue to follow and assist with this as needed.    Portions of this note were completed using Dragon dictation software.  Although reviewed, there may be typographical and other inadvertent errors that remain.           End of Life Planning:  Patient currently has an advanced directive: Yes.  Practitioner is supportive of decision.    COUNSELING:  Reviewed preventive health counseling, as reflected in patient instructions       Regular exercise       Healthy diet/nutrition       Hearing screening       Dental care       Immunizations    Vaccinated for: Influenza             Aspirin Prophylaxsis       Prostate cancer screening       Osteoporosis Prevention/Bone Health       The ASCVD Risk score  "(Anne HARTMAN Jr., et al., 2013) failed to calculate for the following reasons:    The 2013 ASCVD risk score is only valid for ages 40 to 79    Estimated body mass index is 36.81 kg/m  as calculated from the following:    Height as of this encounter: 1.702 m (5' 7\").    Weight as of this encounter: 106.6 kg (235 lb).    Weight management plan: Discussed healthy diet and exercise guidelines     reports that he quit smoking about 38 years ago. He has a 7.50 pack-year smoking history. He quit smokeless tobacco use about 2 years ago.  His smokeless tobacco use included chew.      Appropriate preventive services were discussed with this patient, including applicable screening as appropriate for cardiovascular disease, diabetes, osteopenia/osteoporosis, and glaucoma.  As appropriate for age/gender, discussed screening for colorectal cancer, prostate cancer, breast cancer, and cervical cancer. Checklist reviewing preventive services available has been given to the patient.    Reviewed patients plan of care and provided an AVS. The Intermediate Care Plan ( asthma action plan, low back pain action plan, and migraine action plan) for Juaqiun meets the Care Plan requirement. This Care Plan has been established and reviewed with the Patient.    Counseling Resources:  ATP IV Guidelines  Pooled Cohorts Equation Calculator  Breast Cancer Risk Calculator  FRAX Risk Assessment  ICSI Preventive Guidelines  Dietary Guidelines for Americans, 2010  USDA's MyPlate  ASA Prophylaxis  Lung CA Screening    Timo Howard MD, MD  [facility]    Identified Health Risks:  "

## 2019-10-09 ENCOUNTER — OFFICE VISIT (OUTPATIENT)
Dept: FAMILY MEDICINE | Facility: OTHER | Age: 79
End: 2019-10-09
Payer: COMMERCIAL

## 2019-10-09 VITALS
SYSTOLIC BLOOD PRESSURE: 130 MMHG | HEIGHT: 67 IN | BODY MASS INDEX: 36.88 KG/M2 | WEIGHT: 235 LBS | OXYGEN SATURATION: 96 % | TEMPERATURE: 98 F | DIASTOLIC BLOOD PRESSURE: 72 MMHG | HEART RATE: 59 BPM

## 2019-10-09 DIAGNOSIS — R33.8 BENIGN PROSTATIC HYPERPLASIA WITH URINARY RETENTION: ICD-10-CM

## 2019-10-09 DIAGNOSIS — E66.01 MORBID OBESITY (H): ICD-10-CM

## 2019-10-09 DIAGNOSIS — E78.5 HYPERLIPIDEMIA LDL GOAL <130: ICD-10-CM

## 2019-10-09 DIAGNOSIS — M72.0 DUPUYTREN CONTRACTURE: ICD-10-CM

## 2019-10-09 DIAGNOSIS — Z00.00 ENCOUNTER FOR MEDICARE ANNUAL WELLNESS EXAM: Primary | ICD-10-CM

## 2019-10-09 DIAGNOSIS — N40.1 BENIGN PROSTATIC HYPERPLASIA WITH URINARY RETENTION: ICD-10-CM

## 2019-10-09 DIAGNOSIS — I10 ESSENTIAL HYPERTENSION WITH GOAL BLOOD PRESSURE LESS THAN 140/90: ICD-10-CM

## 2019-10-09 LAB
ALBUMIN SERPL-MCNC: 3.9 G/DL (ref 3.4–5)
ALP SERPL-CCNC: 60 U/L (ref 40–150)
ALT SERPL W P-5'-P-CCNC: 55 U/L (ref 0–70)
ANION GAP SERPL CALCULATED.3IONS-SCNC: 6 MMOL/L (ref 3–14)
AST SERPL W P-5'-P-CCNC: 32 U/L (ref 0–45)
BILIRUB SERPL-MCNC: 0.4 MG/DL (ref 0.2–1.3)
BUN SERPL-MCNC: 23 MG/DL (ref 7–30)
CALCIUM SERPL-MCNC: 9.1 MG/DL (ref 8.5–10.1)
CHLORIDE SERPL-SCNC: 109 MMOL/L (ref 94–109)
CHOLEST SERPL-MCNC: 144 MG/DL
CO2 SERPL-SCNC: 28 MMOL/L (ref 20–32)
CREAT SERPL-MCNC: 0.78 MG/DL (ref 0.66–1.25)
GFR SERPL CREATININE-BSD FRML MDRD: 84 ML/MIN/{1.73_M2}
GLUCOSE SERPL-MCNC: 112 MG/DL (ref 70–99)
HDLC SERPL-MCNC: 44 MG/DL
LDLC SERPL CALC-MCNC: 76 MG/DL
NONHDLC SERPL-MCNC: 100 MG/DL
POTASSIUM SERPL-SCNC: 4.2 MMOL/L (ref 3.4–5.3)
PROT SERPL-MCNC: 7.3 G/DL (ref 6.8–8.8)
SODIUM SERPL-SCNC: 143 MMOL/L (ref 133–144)
TRIGL SERPL-MCNC: 119 MG/DL

## 2019-10-09 PROCEDURE — 80053 COMPREHEN METABOLIC PANEL: CPT | Performed by: FAMILY MEDICINE

## 2019-10-09 PROCEDURE — 99214 OFFICE O/P EST MOD 30 MIN: CPT | Mod: 25 | Performed by: FAMILY MEDICINE

## 2019-10-09 PROCEDURE — 99397 PER PM REEVAL EST PAT 65+ YR: CPT | Mod: 25 | Performed by: FAMILY MEDICINE

## 2019-10-09 PROCEDURE — 80061 LIPID PANEL: CPT | Performed by: FAMILY MEDICINE

## 2019-10-09 PROCEDURE — G0008 ADMIN INFLUENZA VIRUS VAC: HCPCS | Performed by: FAMILY MEDICINE

## 2019-10-09 PROCEDURE — 36415 COLL VENOUS BLD VENIPUNCTURE: CPT | Performed by: FAMILY MEDICINE

## 2019-10-09 PROCEDURE — 90662 IIV NO PRSV INCREASED AG IM: CPT | Performed by: FAMILY MEDICINE

## 2019-10-09 ASSESSMENT — ACTIVITIES OF DAILY LIVING (ADL): CURRENT_FUNCTION: NO ASSISTANCE NEEDED

## 2019-10-09 ASSESSMENT — PAIN SCALES - GENERAL: PAINLEVEL: MILD PAIN (2)

## 2019-10-09 ASSESSMENT — MIFFLIN-ST. JEOR: SCORE: 1729.58

## 2019-10-09 NOTE — LETTER
[facility]  [id number] [place] [address]  [place] [address] [phone]        October 9, 2019    Regarding:  Juaquin Shea  [address]   [place] [address]          To Whom It May Concern;    In regards to antimicrobial prophylaxis prior to dental procedures for the above-named patient, the American Academy of Oral Medicine, the American Dental Association (ADA) in conjunction with the American Academy of Orthopedic Surgeons (AAOS), and the [place] Society for Antimicrobial Chemotherapy all advise against universal use of antimicrobial prophylaxis prior to dental procedures for prevention of prosthetic joint infections.  I concur with their recommendations for this patient.    If you have any questions or concerns, please contact me at [phone].       Sincerely,        Timo Howard MD

## 2019-10-09 NOTE — RESULT ENCOUNTER NOTE
All of your labs were normal for you except your blood sugars.    Your blood sugar is in the prediabetic range.  Can try to prevent this from getting worse by exercising regularly and controlling weight and diet.    Have a nice day!    Dr. Howard

## 2020-03-31 NOTE — PROGRESS NOTES
"Subjective     Juaquin Shea is a 79 year old male who is being evaluated via a billable telephone visit.      The patient has been notified of following:     \"This telephone visit will be conducted via a call between you and your physician/provider. We have found that certain health care needs can be provided without the need for a physical exam.  This service lets us provide the care you need with a short phone conversation.  If a prescription is necessary we can send it directly to your pharmacy.  If lab work is needed we can place an order for that and you can then stop by our lab to have the test done at a later time.    If during the course of the call the physician/provider feels a telephone visit is not appropriate, you will not be charged for this service.\"     Patient has given verbal consent for Telephone visit?  Yes    Juaquin Shea complains of   Chief Complaint   Patient presents with     RECHECK     6 month follow up       ALLERGIES  No known drug allergies    Hyperlipidemia Follow-Up      Are you regularly taking any medication or supplement to lower your cholesterol?   Yes- pravastatin    Are you having muscle aches or other side effects that you think could be caused by your cholesterol lowering medication?  No    Hypertension Follow-up      Do you check your blood pressure regularly outside of the clinic? Yes     Are you following a low salt diet? Yes    Are your blood pressures ever more than 140 on the top number (systolic) OR more   than 90 on the bottom number (diastolic), for example 140/90? No  Today was 125/70    How many servings of fruits and vegetables do you eat daily?  4 or more    On average, how many sweetened beverages do you drink each day (Examples: soda, juice, sweet tea, etc.  Do NOT count diet or artificially sweetened beverages)?   0    How many days per week do you exercise enough to make your heart beat faster? 7    How many minutes a day do you exercise enough to make " your heart beat faster? 20 - 29    How many days per week do you miss taking your medication? 0    His urinary issues are getting a bit worse over time.  Would have some interest in seeing if that would help.      Trigger finger is getting a bit worse.        Current Outpatient Medications   Medication Sig Dispense Refill     ascorbic acid (VITAMIN C) 1000 MG TABS Take 1 tablet by mouth daily.       aspirin 81 MG tablet Take 1 tablet by mouth daily.  3     losartan (COZAAR) 25 MG tablet TAKE 1 TABLET EVERY DAY 90 tablet 3     Multiple Vitamins-Minerals (MULTIVITAMIN ADULT PO)        pravastatin (PRAVACHOL) 40 MG tablet TAKE 1 TABLET EVERY DAY 90 tablet 3     tamsulosin (FLOMAX) 0.4 MG capsule TAKE 1 CAPSULE EVERY DAY 90 capsule 3     Allergies   Allergen Reactions     No Known Drug Allergies      Recent Labs   Lab Test 10/09/19  0903 10/08/18  1006 08/10/17  0924  09/24/15  1352   LDL 76 74 88   < > 89   HDL 44 40 42   < > 38*   TRIG 119 128 78   < > 191*   ALT 55 47  --   --  60   CR 0.78 0.79 0.71   < > 0.58*   GFRESTIMATED 84 >90 >90  Non African American GFR Calc     < > >90  Non  GFR Calc     GFRESTBLACK >90 >90 >90  African American GFR Calc     < > >90   GFR Calc     POTASSIUM 4.2 4.3 4.4   < > 4.6    < > = values in this interval not displayed.      BP Readings from Last 3 Encounters:   10/09/19 130/72   04/08/19 120/74   10/08/18 128/76    Wt Readings from Last 3 Encounters:   10/09/19 106.6 kg (235 lb)   04/08/19 109.3 kg (241 lb)   10/08/18 107 kg (235 lb 12.8 oz)                    Reviewed and updated as needed this visit by Provider         Review of Systems   ROS COMP: Constitutional, HEENT, cardiovascular, pulmonary, gi and gu systems are negative, except as otherwise noted.       Objective   Reported vitals:  There were no vitals taken for this visit.     Psych: Alert and oriented times 3; coherent speech, normal   rate and volume, able to articulate logical thoughts,  able   to abstract reason, no tangential thoughts, no hallucinations   or delusions     Diagnostic Test Results:  Labs reviewed in Epic        Assessment/Plan:    ICD-10-CM    1. Essential hypertension with goal blood pressure less than 140/90  I10 losartan (COZAAR) 25 MG tablet   2. Hyperlipidemia LDL goal <130  E78.5 pravastatin (PRAVACHOL) 40 MG tablet   3. Benign prostatic hyperplasia with urinary retention  N40.1 tamsulosin (FLOMAX) 0.4 MG capsule    R33.8    4. Dupuytren contracture  M72.0    5. Trigger finger, acquired  M65.30       1.  Currently under good control.  Will continue current regimen.  2.  Patient tolerating pravastatin well.  Will continue current regimen.  Labs are due this fall.  3.  Not fully controlled.  Discussed increasing Flomax to help with this.  Patient would like to try that.  If not responding well, will resume previous regimen.  4, 5.  Unclear which problem exactly patient is describing.  Can try splinting for the trigger finger.  Depending on response, may need to get in orthopedics later this year.    Portions of this note were completed using Dragon dictation software.  Although reviewed, there may be typographical and other inadvertent errors that remain.     This phone visit was performed in an attempt to keep relatively healthy patients out of the clinic during the COVID-19 pandemic.     No follow-ups on file.      Phone call duration:  6 minutes    Timo Howard MD, MD

## 2020-04-06 ENCOUNTER — VIRTUAL VISIT (OUTPATIENT)
Dept: FAMILY MEDICINE | Facility: OTHER | Age: 80
End: 2020-04-06
Payer: COMMERCIAL

## 2020-04-06 DIAGNOSIS — M72.0 DUPUYTREN CONTRACTURE: ICD-10-CM

## 2020-04-06 DIAGNOSIS — M65.30 TRIGGER FINGER, ACQUIRED: ICD-10-CM

## 2020-04-06 DIAGNOSIS — I10 ESSENTIAL HYPERTENSION WITH GOAL BLOOD PRESSURE LESS THAN 140/90: Primary | ICD-10-CM

## 2020-04-06 DIAGNOSIS — E78.5 HYPERLIPIDEMIA LDL GOAL <130: ICD-10-CM

## 2020-04-06 DIAGNOSIS — N40.1 BENIGN PROSTATIC HYPERPLASIA WITH URINARY RETENTION: ICD-10-CM

## 2020-04-06 DIAGNOSIS — R33.8 BENIGN PROSTATIC HYPERPLASIA WITH URINARY RETENTION: ICD-10-CM

## 2020-04-06 PROCEDURE — 99214 OFFICE O/P EST MOD 30 MIN: CPT | Mod: TEL | Performed by: FAMILY MEDICINE

## 2020-04-06 RX ORDER — LOSARTAN POTASSIUM 25 MG/1
TABLET ORAL
Qty: 90 TABLET | Refills: 1 | Status: SHIPPED | OUTPATIENT
Start: 2020-04-06 | End: 2020-09-01

## 2020-04-06 RX ORDER — TAMSULOSIN HYDROCHLORIDE 0.4 MG/1
0.8 CAPSULE ORAL DAILY
Qty: 180 CAPSULE | Refills: 1 | Status: SHIPPED | OUTPATIENT
Start: 2020-04-06 | End: 2020-04-08

## 2020-04-06 RX ORDER — PRAVASTATIN SODIUM 40 MG
TABLET ORAL
Qty: 90 TABLET | Refills: 1 | Status: SHIPPED | OUTPATIENT
Start: 2020-04-06 | End: 2020-09-01

## 2020-04-06 NOTE — PATIENT INSTRUCTIONS
"Thank you for visiting [facility]    Try the higher dose of Flomax  To help with your urination.  Let us know if not improving or if you have side effects.  If you do not get any benefit, then I would reduce your dose back down to 1 daily.    Can try a splint to help with your finger symptoms at night.  If these continue to be problematic, we could get you in with orthopedics at some point.    Let me know if any other issues.    Contact us or return if questions or concerns.     Have a nice day!    Dr. Howard     No follow-ups on file.      If you had imaging scheduled please refer to your radiology prep sheet.      If you need medication refills, please contact your pharmacy 3 days before your prescriptions runs out or download the [facility] kell for your smart phone.   If you are out of refills, your pharmacy will contact contact the clinic.    Contact us or return if questions or concerns.     -Your [facility] Care Team:    MD Carie Sharif PA-C Joel De Haan, PA-C Anna Niesen, PA-C Elizabeth \"Renay\" Anne, APRMAGALYS CNP  Natasha Irby APRMAGALYS, MARGA Downing APRN, MARGA Arora, RN, BSN       General information about your   [facility]      Clinic hours:     Lab hours:  Phone [phone]  Monday 7:30 am-7 pm    Monday 8:30 am-6:30 pm  Tuesday-Friday 7:30 am-5 pm   Tuesday-Friday 8:30 am-4:30 pm    Pharmacy hours:  Phone [phone]  Monday 8:30 am-7pm  Tuesday-Friday 8:30am-6 pm                                     Mychart assistance [phone]    We would like to hear from you, how was your visit today?    Isabela Moscoso  Patient Information Supervisor   Patient Care Supervisor  [facility], and [facility], and [facility]            "

## 2020-04-07 ENCOUNTER — TELEPHONE (OUTPATIENT)
Dept: FAMILY MEDICINE | Facility: OTHER | Age: 80
End: 2020-04-07

## 2020-04-07 DIAGNOSIS — N40.1 BENIGN PROSTATIC HYPERPLASIA WITH URINARY RETENTION: ICD-10-CM

## 2020-04-07 DIAGNOSIS — R33.8 BENIGN PROSTATIC HYPERPLASIA WITH URINARY RETENTION: ICD-10-CM

## 2020-04-07 NOTE — TELEPHONE ENCOUNTER
Please clarify directions for Tamsulosin 0.4mg, pleease send new Rx with corrected SIG    Sig - Route: Take 2 capsules (0.8 mg) by mouth daily TAKE 1 CAPSULE EVERY DAY - Oral     Thank you   Pallavi Simmons RT (R)

## 2020-04-08 RX ORDER — TAMSULOSIN HYDROCHLORIDE 0.4 MG/1
0.8 CAPSULE ORAL DAILY
Qty: 180 CAPSULE | Refills: 1 | Status: SHIPPED | OUTPATIENT
Start: 2020-04-08 | End: 2020-09-01

## 2020-05-21 ENCOUNTER — OFFICE VISIT (OUTPATIENT)
Dept: FAMILY MEDICINE | Facility: OTHER | Age: 80
End: 2020-05-21
Payer: COMMERCIAL

## 2020-05-21 VITALS
OXYGEN SATURATION: 96 % | HEIGHT: 67 IN | WEIGHT: 228 LBS | TEMPERATURE: 98.5 F | SYSTOLIC BLOOD PRESSURE: 120 MMHG | HEART RATE: 64 BPM | BODY MASS INDEX: 35.79 KG/M2 | RESPIRATION RATE: 14 BRPM | DIASTOLIC BLOOD PRESSURE: 62 MMHG

## 2020-05-21 DIAGNOSIS — K08.9 POOR DENTITION: ICD-10-CM

## 2020-05-21 DIAGNOSIS — R59.1 LYMPHADENOPATHY: Primary | ICD-10-CM

## 2020-05-21 DIAGNOSIS — R13.10 ABNORMAL SWALLOWING: ICD-10-CM

## 2020-05-21 LAB
ALBUMIN SERPL-MCNC: 3.8 G/DL (ref 3.4–5)
ALP SERPL-CCNC: 64 U/L (ref 40–150)
ALT SERPL W P-5'-P-CCNC: 48 U/L (ref 0–70)
ANION GAP SERPL CALCULATED.3IONS-SCNC: 4 MMOL/L (ref 3–14)
AST SERPL W P-5'-P-CCNC: 25 U/L (ref 0–45)
BASOPHILS # BLD AUTO: 0 10E9/L (ref 0–0.2)
BASOPHILS NFR BLD AUTO: 0.1 %
BILIRUB SERPL-MCNC: 0.5 MG/DL (ref 0.2–1.3)
BUN SERPL-MCNC: 22 MG/DL (ref 7–30)
CALCIUM SERPL-MCNC: 8.7 MG/DL (ref 8.5–10.1)
CHLORIDE SERPL-SCNC: 112 MMOL/L (ref 94–109)
CO2 SERPL-SCNC: 25 MMOL/L (ref 20–32)
CREAT SERPL-MCNC: 0.79 MG/DL (ref 0.66–1.25)
CRP SERPL-MCNC: <2.9 MG/L (ref 0–8)
DIFFERENTIAL METHOD BLD: ABNORMAL
EOSINOPHIL # BLD AUTO: 0.1 10E9/L (ref 0–0.7)
EOSINOPHIL NFR BLD AUTO: 1.5 %
ERYTHROCYTE [DISTWIDTH] IN BLOOD BY AUTOMATED COUNT: 13.3 % (ref 10–15)
GFR SERPL CREATININE-BSD FRML MDRD: 84 ML/MIN/{1.73_M2}
GLUCOSE SERPL-MCNC: 100 MG/DL (ref 70–99)
HCT VFR BLD AUTO: 44.7 % (ref 40–53)
HGB BLD-MCNC: 14.9 G/DL (ref 13.3–17.7)
LYMPHOCYTES # BLD AUTO: 2 10E9/L (ref 0.8–5.3)
LYMPHOCYTES NFR BLD AUTO: 24.2 %
MCH RBC QN AUTO: 31 PG (ref 26.5–33)
MCHC RBC AUTO-ENTMCNC: 33.3 G/DL (ref 31.5–36.5)
MCV RBC AUTO: 93 FL (ref 78–100)
MONOCYTES # BLD AUTO: 0.9 10E9/L (ref 0–1.3)
MONOCYTES NFR BLD AUTO: 10.8 %
NEUTROPHILS # BLD AUTO: 5.2 10E9/L (ref 1.6–8.3)
NEUTROPHILS NFR BLD AUTO: 63.4 %
PLATELET # BLD AUTO: 149 10E9/L (ref 150–450)
POTASSIUM SERPL-SCNC: 4.2 MMOL/L (ref 3.4–5.3)
PROT SERPL-MCNC: 7.5 G/DL (ref 6.8–8.8)
RBC # BLD AUTO: 4.81 10E12/L (ref 4.4–5.9)
SODIUM SERPL-SCNC: 141 MMOL/L (ref 133–144)
TSH SERPL DL<=0.005 MIU/L-ACNC: 1.57 MU/L (ref 0.4–4)
WBC # BLD AUTO: 8.2 10E9/L (ref 4–11)

## 2020-05-21 PROCEDURE — 99214 OFFICE O/P EST MOD 30 MIN: CPT | Performed by: PHYSICIAN ASSISTANT

## 2020-05-21 PROCEDURE — 84443 ASSAY THYROID STIM HORMONE: CPT | Performed by: PHYSICIAN ASSISTANT

## 2020-05-21 PROCEDURE — 86140 C-REACTIVE PROTEIN: CPT | Performed by: PHYSICIAN ASSISTANT

## 2020-05-21 PROCEDURE — 80050 GENERAL HEALTH PANEL: CPT | Performed by: PHYSICIAN ASSISTANT

## 2020-05-21 PROCEDURE — 36415 COLL VENOUS BLD VENIPUNCTURE: CPT | Performed by: PHYSICIAN ASSISTANT

## 2020-05-21 PROCEDURE — 80053 COMPREHEN METABOLIC PANEL: CPT | Performed by: PHYSICIAN ASSISTANT

## 2020-05-21 ASSESSMENT — MIFFLIN-ST. JEOR: SCORE: 1697.83

## 2020-05-21 NOTE — PROGRESS NOTES
Subjective     Juaquin Shea is a 79 year old male who presents to clinic today for the following health issues:    HPI     Concern - Lumps on neck   Onset: April     Description:   Found a small lump on the right side of neck, under jaw. Pea sized.     A smaller lump on the left side, similar area.     Feels like there is pressure in throat when swallowing.     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 and other lower urinary tract symptoms (LUTS)     Tinnitus     Microalbuminuria     LVH (left ventricular hypertrophy)     HYPERLIPIDEMIA LDL GOAL <130     Advanced directives, counseling/discussion     S/P total knee arthroplasty     DVT prophylaxis     Obesity (BMI 35.0-39.9) with comorbidity (H)     Poor dentition     Abnormal swallowing     Lymphadenopathy - right sided mainly     Past Surgical History:   Procedure Laterality Date     ARTHROPLASTY KNEE  2/20/2012    hemiarthroplasty right knee     ARTHROPLASTY KNEE  9/30/2013    Procedure: ARTHROPLASTY KNEE;  Left Total Knee Arthroplasty;  Surgeon: Jose D Siegel MD;  Location: [facility] OR     COLONOSCOPY  03/27/07, 06/08/10     COLONOSCOPY N/A 6/16/2015    Procedure: COLONOSCOPY;  Surgeon: Yg Rendon MD;  Location: [facility] GI     EXCISE MASS HAND  3/29/2013    Procedure: EXCISE MASS HAND;  excision right hand mass;  Surgeon: Rafa Moore MD;  Location: [facility] OR     HC COLONOSCOPY W/WO BRUSH/WASH  2000     HC COLONOSCOPY W/WO BRUSH/WASH  2004     HC REPAIR ING HERNIA,5+Y/O,REDUCIBL      X2     HC REVISE MEDIAN N/CARPAL TUNNEL SURG  5/11/2000    left wrist     HC REVISE MEDIAN N/CARPAL TUNNEL SURG  7/21/1994    right wrist      RELEASE CARPAL TUNNEL  2011    Procedure:RELEASE CARPAL TUNNEL; right carpal tunnel release, right ring and middle finger A-1 pulley releases    ; Surgeon:BARRETT CURRY; Location:[facility] OR     RELEASE TRIGGER FINGER  2011    Procedure:RELEASE TRIGGER FINGER; Surgeon:BARRETT CURRY; Location:[facility] OR       Social History     Tobacco Use     Smoking status: Former Smoker     Packs/day: 0.50     Years: 15.00     Pack years: 7.50     Last attempt to quit: 1981     Years since quittin.4     Smokeless tobacco: Former User     Types: Chew     Quit date: 2/10/2017     Tobacco comment: quit in    Substance Use Topics     Alcohol use: Yes     Comment: beer occ     Family History   Problem Relation Age of Onset     Heart Disease Mother         doctored for heart problems at young age     Prostate Cancer Father         age 70s     Stomach Cancer Brother         Cancer free now for over 10 years (T: 8/10/17)     Prostate Cancer Brother      Other - See Comments Brother         [place] virus fighting for about 6 weeks 10/08/18     Respiratory Brother         NE, uses CPAP     Cancer - colorectal No family hx of          Current Outpatient Medications   Medication Sig Dispense Refill     ascorbic acid (VITAMIN C) 1000 MG TABS Take 1 tablet by mouth daily.       aspirin 81 MG tablet Take 1 tablet by mouth daily.  3     losartan (COZAAR) 25 MG tablet TAKE 1 TABLET EVERY DAY 90 tablet 1     Multiple Vitamins-Minerals (MULTIVITAMIN ADULT PO)        pravastatin (PRAVACHOL) 40 MG tablet TAKE 1 TABLET EVERY DAY 90 tablet 1     tamsulosin (FLOMAX) 0.4 MG capsule Take 2 capsules (0.8 mg) by mouth daily 180 capsule 1     Allergies   Allergen Reactions     No Known Drug Allergies      Recent Labs   Lab Test 10/09/19  0903 10/08/18  1006 08/10/17  0924  09/24/15  1352   LDL 76 74 88   < > 89   HDL 44 40 42   < > 38*   TRIG 119 128 78   < > 191*   ALT 55 47  --   --  60   CR 0.78 0.79 0.71   < > 0.58*   GFRESTIMATED  "84 >90 >90  Non African American GFR Calc     < > >90  Non  GFR Calc     GFRESTBLACK >90 >90 >90  African American GFR Calc     < > >90   GFR Calc     POTASSIUM 4.2 4.3 4.4   < > 4.6    < > = values in this interval not displayed.      BP Readings from Last 3 Encounters:   05/21/20 120/62   10/09/19 130/72   04/08/19 120/74    Wt Readings from Last 3 Encounters:   05/21/20 103.4 kg (228 lb)   10/09/19 106.6 kg (235 lb)   04/08/19 109.3 kg (241 lb)           Reviewed and updated as needed this visit by Provider         Review of Systems   Constitutional, HEENT, cardiovascular, pulmonary, GI, , musculoskeletal, neuro, skin, endocrine and psych systems are negative, except as otherwise noted.      Objective    /62 (BP Location: Right arm, Patient Position: Chair, Cuff Size: Adult Large)   Pulse 64   Temp 98.5  F (36.9  C) (Temporal)   Resp 14   Ht 1.702 m (5' 7\")   Wt 103.4 kg (228 lb)   SpO2 96%   BMI 35.71 kg/m    Body mass index is 35.71 kg/m .  Physical Exam   GENERAL: healthy, alert and no distress  HENT: normal cephalic/atraumatic, ear canals and TM's normal, nose and mouth without ulcers or lesions, oropharynx clear, oral mucous membranes moist and very poor dentition is noted today on exam.  NECK: cervical adenopathy right sided more than the left at this point in time.  The largest lymph node that I suspect is present is approximately 2 cm in rough diameter anterior to the sternocleidomastoid muscle., no asymmetry, masses, or scars and thyroid normal to palpation  RESP: lungs clear to auscultation - no rales, rhonchi or wheezes  CV: regular rate and rhythm, normal S1 S2, no S3 or S4, no murmur, click or rub, no peripheral edema and peripheral pulses strong  ABDOMEN: soft, nontender, no hepatosplenomegaly, no masses and bowel sounds normal  MS: no gross musculoskeletal defects noted, no edema  SKIN: no suspicious lesions or rashes  NEURO: Normal strength and tone, " "mentation intact and speech normal  PSYCH: mentation appears normal, affect normal/bright    Diagnostic Test Results:  Labs reviewed in Epic  Results for orders placed or performed in visit on 05/21/20 (from the past 24 hour(s))   CBC with platelets and differential   Result Value Ref Range    WBC 8.2 4.0 - 11.0 10e9/L    RBC Count 4.81 4.4 - 5.9 10e12/L    Hemoglobin 14.9 13.3 - 17.7 g/dL    Hematocrit 44.7 40.0 - 53.0 %    MCV 93 78 - 100 fl    MCH 31.0 26.5 - 33.0 pg    MCHC 33.3 31.5 - 36.5 g/dL    RDW 13.3 10.0 - 15.0 %    Platelet Count 149 (L) 150 - 450 10e9/L    % Neutrophils 63.4 %    % Lymphocytes 24.2 %    % Monocytes 10.8 %    % Eosinophils 1.5 %    % Basophils 0.1 %    Absolute Neutrophil 5.2 1.6 - 8.3 10e9/L    Absolute Lymphocytes 2.0 0.8 - 5.3 10e9/L    Absolute Monocytes 0.9 0.0 - 1.3 10e9/L    Absolute Eosinophils 0.1 0.0 - 0.7 10e9/L    Absolute Basophils 0.0 0.0 - 0.2 10e9/L    Diff Method Automated Method            Assessment & Plan     1. Lymphadenopathy - right sided mainly  2. Abnormal swallowing  3. Poor dentition  Further evaluation and treatment with ultrasound and related labs is done today.  We will follow-up as dictated by results.  - US Head Neck Soft Tissue; Future  - CBC with platelets and differential  - Comprehensive metabolic panel  - CRP, inflammation  - TSH with free T4 reflex       BMI:   Estimated body mass index is 35.71 kg/m  as calculated from the following:    Height as of this encounter: 1.702 m (5' 7\").    Weight as of this encounter: 103.4 kg (228 lb).   Weight management plan: Patient was referred to their PCP to discuss a diet and exercise plan.    Return in about 4 weeks (around 6/18/2020) for if symptoms do not improve, recheck of current condition.    Morris Rob PA-C  [facility]"

## 2020-05-21 NOTE — PROGRESS NOTES
"Juaquin Shea is a 79 year old male who is being evaluated via a billable telephone visit.      The patient has been notified of following:     \"This telephone visit will be conducted via a call between you and your physician/provider. We have found that certain health care needs can be provided without the need for a physical exam.  This service lets us provide the care you need with a short phone conversation.  If a prescription is necessary we can send it directly to your pharmacy.  If lab work is needed we can place an order for that and you can then stop by our lab to have the test done at a later time.    Telephone visits are billed at different rates depending on your insurance coverage. During this emergency period, for some insurers they may be billed the same as an in-person visit.  Please reach out to your insurance provider with any questions.    If during the course of the call the physician/provider feels a telephone visit is not appropriate, you will not be charged for this service.\"    Patient has given verbal consent for Telephone visit?  Yes    What phone number would you like to be contacted at? [phone]    How would you like to obtain your AVS? Mail a copy    Subjective     Juaquin Shea is a 79 year old male who presents via phone visit today for the following health issues:    HPI     Concern - Lumps on neck   Onset: April     Description:   Found a small lump on the right side of neck, under jaw. Pea sized.     A smaller lump on the left side, similar area.     Feels like there is pressure in throat when swallowing.           {PEDS Chronic and Acute Problems (Optional):170967}     {additonal problems for provider to add (Optional):447113}    {HIST REVIEW/ LINKS 2 (Optional):569233}    Reviewed and updated as needed this visit by Provider         Review of Systems   {ROS COMP (Optional):093626}       Objective   Reported vitals:  There were no vitals taken for this visit.   {GENERAL " "APPEARANCE:50::\"healthy\",\"alert\",\"no distress\"}  PSYCH: Alert and oriented times 3; coherent speech, normal   rate and volume, able to articulate logical thoughts, able   to abstract reason, no tangential thoughts, no hallucinations   or delusions  His affect is { :5176548::\"normal\"}  RESP: No cough, no audible wheezing, able to talk in full sentences  Remainder of exam unable to be completed due to telephone visits    {Diagnostic Test Results (Optional):841660::\"Diagnostic Test Results:\",\"Labs reviewed in Epic\"}        Assessment/Plan:  {Diagnosis, Associated Orders and Comment:832023}    No follow-ups on file.      Phone call duration:  *** minutes    {signature options:570169}      "

## 2020-05-27 ENCOUNTER — HOSPITAL ENCOUNTER (OUTPATIENT)
Dept: ULTRASOUND IMAGING | Facility: CLINIC | Age: 80
Discharge: HOME | End: 2020-05-27
Attending: PHYSICIAN ASSISTANT | Admitting: PHYSICIAN ASSISTANT
Payer: COMMERCIAL

## 2020-05-27 DIAGNOSIS — R59.1 LYMPHADENOPATHY: ICD-10-CM

## 2020-05-27 DIAGNOSIS — R13.10 ABNORMAL SWALLOWING: ICD-10-CM

## 2020-05-27 PROCEDURE — 76536 US EXAM OF HEAD AND NECK: CPT

## 2020-06-10 ENCOUNTER — TELEPHONE (OUTPATIENT)
Dept: FAMILY MEDICINE | Facility: OTHER | Age: 80
End: 2020-06-10

## 2020-06-10 NOTE — TELEPHONE ENCOUNTER
Reason for Call:  Request for results:    Name of test or procedure: ultrasound    Date of test of procedure: 5-27    Location of the test or procedure: [facility] to leave the result message on voice mail or with a family member? YES    Phone number Patient can be reached at:  Home number on file [phone] (home)    Additional comments: please call with results.    Call taken on 6/10/2020 at 9:36 AM by Fabiola Whittington

## 2020-06-10 NOTE — LETTER
[facility]  [address] [facility] [id number] [phone]        Rozina 10, 2020    Juaquin Shea  [address]   [place] [address]          Dear Juaquin,    Please review the following as the results of your recent ultrasound of your neck.  If you continue to have further struggles then I think an MRI of the soft tissue of your neck would be in order.  I am sorry you did not get a letter with the results sooner than this.    HISTORY: Lymphadenopathy. Abnormal swallowing.     COMPARISONS:  None.                                                                      IMPRESSION:   1. Targeted ultrasound of the palpable nodule in the right side of the  neck demonstrates a normal-appearing lymph node with a normal fatty  hilum in maximum short axis of 0.6 cm.  2. Targeted ultrasound of the left side of the neck at the site of the  palpable finding demonstrates no sonographic abnormality. No definite  etiology for this lump is seen.  3. Clinical follow-up is recommended. If there is clinical concern for  abnormal swallowing, further evaluation with CT or MRI of the soft  tissues of the neck would be recommended.       Sincerely,        Morris Sarabia PA-C

## 2020-06-10 NOTE — TELEPHONE ENCOUNTER
Called the patient and discussed results.  He does not recall the conversation he had with Dr. Fernández.  Apologize for the delay in getting his results to him.  Advised that if he has any further problems then I would recommend an MRI of the soft tissue of the neck.  He states he will call back if he has any other concerns.  Electronically signed:    Morris Rob PA-C

## 2020-08-28 DIAGNOSIS — R33.8 BENIGN PROSTATIC HYPERPLASIA WITH URINARY RETENTION: ICD-10-CM

## 2020-08-28 DIAGNOSIS — I10 ESSENTIAL HYPERTENSION WITH GOAL BLOOD PRESSURE LESS THAN 140/90: ICD-10-CM

## 2020-08-28 DIAGNOSIS — N40.1 BENIGN PROSTATIC HYPERPLASIA WITH URINARY RETENTION: ICD-10-CM

## 2020-08-28 DIAGNOSIS — E78.5 HYPERLIPIDEMIA LDL GOAL <130: ICD-10-CM

## 2020-09-01 RX ORDER — TAMSULOSIN HYDROCHLORIDE 0.4 MG/1
0.8 CAPSULE ORAL DAILY
Qty: 180 CAPSULE | Refills: 0 | Status: SHIPPED | OUTPATIENT
Start: 2020-09-01 | End: 2020-12-30

## 2020-09-01 RX ORDER — LOSARTAN POTASSIUM 25 MG/1
TABLET ORAL
Qty: 90 TABLET | Refills: 0 | Status: SHIPPED | OUTPATIENT
Start: 2020-09-01 | End: 2021-02-05

## 2020-09-01 RX ORDER — PRAVASTATIN SODIUM 40 MG
TABLET ORAL
Qty: 90 TABLET | Refills: 0 | Status: SHIPPED | OUTPATIENT
Start: 2020-09-01 | End: 2021-02-10

## 2020-12-23 ENCOUNTER — OFFICE VISIT (OUTPATIENT)
Dept: FAMILY MEDICINE | Facility: OTHER | Age: 80
End: 2020-12-23
Payer: COMMERCIAL

## 2020-12-23 VITALS
SYSTOLIC BLOOD PRESSURE: 120 MMHG | WEIGHT: 225 LBS | RESPIRATION RATE: 16 BRPM | OXYGEN SATURATION: 96 % | HEART RATE: 63 BPM | TEMPERATURE: 98.9 F | DIASTOLIC BLOOD PRESSURE: 60 MMHG | BODY MASS INDEX: 35.24 KG/M2

## 2020-12-23 DIAGNOSIS — E78.5 HYPERLIPIDEMIA LDL GOAL <130: ICD-10-CM

## 2020-12-23 DIAGNOSIS — N40.1 BENIGN PROSTATIC HYPERPLASIA WITH URINARY RETENTION: ICD-10-CM

## 2020-12-23 DIAGNOSIS — K08.9 POOR DENTITION: ICD-10-CM

## 2020-12-23 DIAGNOSIS — R33.8 BENIGN PROSTATIC HYPERPLASIA WITH URINARY RETENTION: ICD-10-CM

## 2020-12-23 DIAGNOSIS — M72.0 DUPUYTREN CONTRACTURE: ICD-10-CM

## 2020-12-23 DIAGNOSIS — H25.9 AGE-RELATED CATARACT OF BOTH EYES, UNSPECIFIED AGE-RELATED CATARACT TYPE: ICD-10-CM

## 2020-12-23 DIAGNOSIS — I10 ESSENTIAL HYPERTENSION WITH GOAL BLOOD PRESSURE LESS THAN 140/90: ICD-10-CM

## 2020-12-23 DIAGNOSIS — D17.30 LIPOMA OF SKIN AND SUBCUTANEOUS TISSUE: ICD-10-CM

## 2020-12-23 DIAGNOSIS — Z01.818 PREOP GENERAL PHYSICAL EXAM: Primary | ICD-10-CM

## 2020-12-23 DIAGNOSIS — E66.01 MORBID OBESITY (H): ICD-10-CM

## 2020-12-23 LAB
ALBUMIN SERPL-MCNC: 4.1 G/DL (ref 3.4–5)
ALP SERPL-CCNC: 64 U/L (ref 40–150)
ALT SERPL W P-5'-P-CCNC: 30 U/L (ref 0–70)
ANION GAP SERPL CALCULATED.3IONS-SCNC: 2 MMOL/L (ref 3–14)
AST SERPL W P-5'-P-CCNC: 20 U/L (ref 0–45)
BILIRUB SERPL-MCNC: 0.6 MG/DL (ref 0.2–1.3)
BUN SERPL-MCNC: 17 MG/DL (ref 7–30)
CALCIUM SERPL-MCNC: 9.6 MG/DL (ref 8.5–10.1)
CHLORIDE SERPL-SCNC: 109 MMOL/L (ref 94–109)
CHOLEST SERPL-MCNC: 152 MG/DL
CO2 SERPL-SCNC: 30 MMOL/L (ref 20–32)
CREAT SERPL-MCNC: 0.73 MG/DL (ref 0.66–1.25)
ERYTHROCYTE [DISTWIDTH] IN BLOOD BY AUTOMATED COUNT: 13.2 % (ref 10–15)
GFR SERPL CREATININE-BSD FRML MDRD: 86 ML/MIN/{1.73_M2}
GLUCOSE SERPL-MCNC: 102 MG/DL (ref 70–99)
HCT VFR BLD AUTO: 46.5 % (ref 40–53)
HDLC SERPL-MCNC: 39 MG/DL
HGB BLD-MCNC: 15.5 G/DL (ref 13.3–17.7)
LDLC SERPL CALC-MCNC: 81 MG/DL
MCH RBC QN AUTO: 30.6 PG (ref 26.5–33)
MCHC RBC AUTO-ENTMCNC: 33.3 G/DL (ref 31.5–36.5)
MCV RBC AUTO: 92 FL (ref 78–100)
NONHDLC SERPL-MCNC: 113 MG/DL
PLATELET # BLD AUTO: 148 10E9/L (ref 150–450)
POTASSIUM SERPL-SCNC: 4.5 MMOL/L (ref 3.4–5.3)
PROT SERPL-MCNC: 7.6 G/DL (ref 6.8–8.8)
RBC # BLD AUTO: 5.07 10E12/L (ref 4.4–5.9)
SODIUM SERPL-SCNC: 141 MMOL/L (ref 133–144)
TRIGL SERPL-MCNC: 158 MG/DL
WBC # BLD AUTO: 8.5 10E9/L (ref 4–11)

## 2020-12-23 PROCEDURE — 99214 OFFICE O/P EST MOD 30 MIN: CPT | Performed by: FAMILY MEDICINE

## 2020-12-23 PROCEDURE — 85027 COMPLETE CBC AUTOMATED: CPT | Performed by: FAMILY MEDICINE

## 2020-12-23 PROCEDURE — 80053 COMPREHEN METABOLIC PANEL: CPT | Performed by: FAMILY MEDICINE

## 2020-12-23 PROCEDURE — 80061 LIPID PANEL: CPT | Performed by: FAMILY MEDICINE

## 2020-12-23 PROCEDURE — 36415 COLL VENOUS BLD VENIPUNCTURE: CPT | Performed by: FAMILY MEDICINE

## 2020-12-23 NOTE — PROGRESS NOTES
[address]  [facility] [address]  Phone: [phone]  Primary Provider: Girish Howard  Pre-op Performing Provider: GIRISH HOWARD    PREOPERATIVE EVALUATION:  Today's date: 12/23/2020    Juaquin Shea is a 80 year old male who presents for a preoperative evaluation.    Surgical Information:  Surgery/Procedure: cataract   Surgery Location: [facility]  Surgeon:    Surgery Date: 01/05/2021 and 01/12/2021  Time of Surgery: TBD  Where patient plans to recover: At home with family  Fax number for surgical facility: [phone]    Type of Anesthesia Anticipated: Local with MAC    Subjective     HPI related to upcoming procedure: Pt went for his eye exam 2 years ago and noted that he had cataracts.  He hasn't noted  Any problems, but understands that he can't get a new Rx until has cataract surgery.        Preop Questions 12/23/2020   1. Have you ever had a heart attack or stroke? No   2. Have you ever had surgery on your heart or blood vessels, such as a stent placement, a coronary artery bypass, or surgery on an artery in your head, neck, heart, or legs? No   3. Do you have chest pain with activity? No   4. Do you have a history of  heart failure? No   5. Do you currently have a cold, bronchitis or symptoms of other infection? No   6. Do you have a cough, shortness of breath, or wheezing? No   7. Do you or anyone in your family have previous history of blood clots? No   8. Do you or does anyone in your family have a serious bleeding problem such as prolonged bleeding following surgeries or cuts? No   9. Have you ever had problems with anemia or been told to take iron pills? No   10. Have you had any abnormal blood loss such as black, tarry or bloody stools? No   11. Have you ever had a blood transfusion? No   12. Are you willing to have a blood transfusion if it is medically needed before, during, or after your surgery? Yes   13.  Have you or any of your relatives ever had problems with anesthesia? No     Health Care Directive:  Patient does not have a Health Care Directive or Living Will: Advance Directive received and scanned. Click on Code in the patient header to view.    Preoperative Review of :   reviewed - no record of controlled substances prescribed.      Status of Chronic Conditions:  See problem list for active medical problems.  Problems all longstanding and stable, except as noted/documented.  See ROS for pertinent symptoms related to these conditions.    HYPERLIPIDEMIA - Patient has a long history of significant Hyperlipidemia requiring medication for treatment with recent good control. Patient reports no problems or side effects with the medication.   Recent Labs   Lab Test 10/09/19  0903 10/08/18  1006 09/24/15  1352 09/24/15  1352 09/29/14  0919   CHOL 144 140   < > 165 165   HDL 44 40   < > 38* 41   LDL 76 74   < > 89 103   TRIG 119 128   < > 191* 103   CHOLHDLRATIO  --   --   --  4.3 4.0    < > = values in this interval not displayed.       HYPERTENSION - Patient has longstanding history of HTN , currently denies any symptoms referable to elevated blood pressure. Specifically denies chest pain, palpitations, dyspnea, orthopnea, PND or peripheral edema. Blood pressure readings have been in normal range. Current medication regimen is as listed below. Patient denies any side effects of medication.       Review of Systems  CONSTITUTIONAL: NEGATIVE for fever, chills, change in weight  INTEGUMENTARY/SKIN: NEGATIVE for worrisome rashes, moles or lesions  EYES: see above  ENT/MOUTH: NEGATIVE for ear, mouth and throat problems  RESP: NEGATIVE for significant cough or SOB  CV: NEGATIVE for chest pain, palpitations or peripheral edema  GI: NEGATIVE for nausea, abdominal pain, heartburn, or change in bowel habits   male :enlarged prostate,  dribbling  MUSCULOSKELETAL:hand pain, carpal tunnel symptoms, contracture  NEURO:  NEGATIVE for weakness, dizziness or paresthesias  ENDOCRINE: NEGATIVE for temperature intolerance, skin/hair changes  HEME: NEGATIVE for bleeding problems  PSYCHIATRIC: NEGATIVE for changes in mood or affect    Patient Active Problem List    Diagnosis Date Noted     Poor dentition 05/21/2020     Priority: Medium     Abnormal swallowing 05/21/2020     Priority: Medium     Lymphadenopathy - right sided mainly 05/21/2020     Priority: Medium     Obesity (BMI 35.0-39.9) with comorbidity (H) 10/08/2018     Priority: Medium     S/P total knee arthroplasty 09/30/2013     Priority: Medium     left       DVT prophylaxis 09/30/2013     Priority: Medium     Advanced directives, counseling/discussion 09/16/2011     Priority: Medium     He does have an Advanced care plan and will bring it in.  MP/MA       HYPERLIPIDEMIA LDL GOAL <130 10/31/2010     Priority: Medium     Microalbuminuria 05/18/2010     Priority: Medium     History of colonic polyps 05/17/2010     Priority: Medium     Adenoma 2007  Problem list name updated by automated process. Provider to review       Family history of prostate cancer 05/17/2010     Priority: Medium     Hypertension goal BP (blood pressure) < 140/90 05/17/2010     Priority: Medium     Unspecified hyperplasia of prostate with urinary obstruction and other lower urinary tract symptoms (LUTS) 05/17/2010     Priority: Medium     Tinnitus 05/17/2010     Priority: Medium     LVH (left ventricular hypertrophy) 05/17/2010     Priority: Medium     suspected, by voltage criteria on ECG       Obesity      Priority: Medium     Hyperlipidemia      Priority: Medium     Problem list name updated by automated process. 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     Priority: Medium      Past Medical  History:   Diagnosis Date     Benign neoplasm of colon 2007    adenomatous colon polyps removed     Degeneration of cervical intervertebral disc 2002    multi-level     Degeneration of lumbar or lumbosacral intervertebral disc 2007    multi-level     Erectile dysfunction      Hypertrophy of breast 2004    benign US consistent with gynecomastia     Impaired fasting glucose 2006    fasting      LVH (left ventricular hypertrophy) 5/17/10    suspected, by voltage criteria on ECG     Microalbuminuria 5/18/2010     Nonspecific abnormal results of liver function study 2006         Obesity      Other and unspecified hyperlipidemia 2006     Spinal stenosis, lumbar region, without neurogenic claudication 2007    mod-severe at L2-3 on MRI     Tinnitus 5/17/2010     Past Surgical History:   Procedure Laterality Date     ARTHROPLASTY KNEE  2/20/2012    hemiarthroplasty right knee     ARTHROPLASTY KNEE  9/30/2013    Procedure: ARTHROPLASTY KNEE;  Left Total Knee Arthroplasty;  Surgeon: Jose D Siegel MD;  Location: [facility]     COLONOSCOPY  03/27/07, 06/08/10     COLONOSCOPY N/A 6/16/2015    Procedure: COLONOSCOPY;  Surgeon: Yg Rendon MD;  Location: [facility] GI     EXCISE MASS HAND  3/29/2013    Procedure: EXCISE MASS HAND;  excision right hand mass;  Surgeon: Rafa Moore MD;  Location: [facility] OR     HC COLONOSCOPY W/WO BRUSH/WASH  2000     HC COLONOSCOPY W/WO BRUSH/WASH  2004     HC REPAIR ING HERNIA,5+Y/O,REDUCIBL      X2     HC REVISE MEDIAN N/CARPAL TUNNEL SURG  5/11/2000    left wrist     HC REVISE MEDIAN N/CARPAL TUNNEL SURG  7/21/1994    right wrist     RELEASE CARPAL TUNNEL  12/8/2011    Procedure:RELEASE CARPAL TUNNEL; right carpal tunnel release, right ring and middle finger A-1 pulley releases    ; Surgeon:BARRETT CURRY; Location:[facility] OR     RELEASE TRIGGER FINGER  12/8/2011    Procedure:RELEASE TRIGGER FINGER; Surgeon:BARRETT CURRY; Location:[facility] OR     Current Outpatient Medications    Medication Sig Dispense Refill     ascorbic acid (VITAMIN C) 1000 MG TABS Take 1 tablet by mouth daily.       aspirin 81 MG tablet Take 1 tablet by mouth daily.  3     losartan (COZAAR) 25 MG tablet TAKE 1 TABLET EVERY DAY 90 tablet 0     Multiple Vitamins-Minerals (MULTIVITAMIN ADULT PO)        pravastatin (PRAVACHOL) 40 MG tablet TAKE 1 TABLET EVERY DAY 90 tablet 0     tamsulosin (FLOMAX) 0.4 MG capsule TAKE 2 CAPSULES (0.8 MG) BY MOUTH DAILY 180 capsule 0       Allergies   Allergen Reactions     No Known Drug Allergies         Social History     Tobacco Use     Smoking status: Former Smoker     Packs/day: 0.50     Years: 15.00     Pack years: 7.50     Quit date: 1981     Years since quittin.0     Smokeless tobacco: Former User     Types: Chew     Quit date: 2/10/2017     Tobacco comment: quit in    Substance Use Topics     Alcohol use: Yes     Comment: beer occ     Family History   Problem Relation Age of Onset     Heart Disease Mother         doctored for heart problems at young age     Prostate Cancer Father         age 70s     Stomach Cancer Brother         Cancer free now for over 10 years (T: 8/10/17)     Prostate Cancer Brother      Other - See Comments Brother         [place] virus fighting for about 6 weeks 10/08/18     Respiratory Brother         NE, uses CPAP     Cancer - colorectal No family hx of      History   Drug Use No         Objective     /60   Pulse 63   Temp 98.9  F (37.2  C) (Temporal)   Resp 16   Wt 102.1 kg (225 lb)   SpO2 96%   BMI 35.24 kg/m      Physical Exam    GENERAL APPEARANCE: healthy, alert and no distress     EYES: EOMI,  PERRL     HENT: ear canals and TM's normal and nose and mouth without ulcers or lesions     NECK: no adenopathy, no asymmetry, masses, or scars and thyroid normal to palpation     RESP: lungs clear to auscultation - no rales, rhonchi or wheezes     CV: regular rates and rhythm, normal S1 S2, no S3 or S4 and no murmur, click or rub      ABDOMEN:  soft, nontender, no HSM or masses and bowel sounds normal     MS: extremities normal- no gross deformities noted, no evidence of inflammation in joints, FROM in all extremities.     SKIN: lipoma on back     NEURO: Normal strength and tone, sensory exam grossly normal, mentation intact and speech normal     PSYCH: mentation appears normal. and affect normal/bright     LYMPHATICS: No cervical adenopathy    Recent Labs   Lab Test 05/21/20  1330 10/09/19  0903   HGB 14.9  --    *  --     143   POTASSIUM 4.2 4.2   CR 0.79 0.78        Diagnostics:  Labs pending at this time.  Results will be reviewed when available.   No EKG required for low risk surgery (cataract, skin procedure, breast biopsy, etc).    Revised Cardiac Risk Index (RCRI):  The patient has the following serious cardiovascular risks for perioperative complications:   - No serious cardiac risks = 0 points     RCRI Interpretation: 0 points: Class I (very low risk - 0.4% complication rate)           Assessment & Plan   The proposed surgical procedure is considered LOW risk.    Juaquin was seen today for pre-op exam.    Diagnoses and all orders for this visit:    Preop general physical exam  -     Comprehensive metabolic panel (BMP + Alb, Alk Phos, ALT, AST, Total. Bili, TP)  -     CBC with platelets  -     Lipid panel reflex to direct LDL Non-fasting    Age-related cataract of both eyes, unspecified age-related cataract type  -     Comprehensive metabolic panel (BMP + Alb, Alk Phos, ALT, AST, Total. Bili, TP)  -     CBC with platelets  -     Lipid panel reflex to direct LDL Non-fasting    Hyperlipidemia LDL goal <130  -     Comprehensive metabolic panel (BMP + Alb, Alk Phos, ALT, AST, Total. Bili, TP)  -     CBC with platelets  -     Lipid panel reflex to direct LDL Non-fasting    Essential hypertension with goal blood pressure less than 140/90  -     Comprehensive metabolic panel (BMP + Alb, Alk Phos, ALT, AST, Total. Bili, TP)  -      CBC with platelets    Obesity (BMI 35.0-39.9) with comorbidity (H)    Poor dentition    Dupuytren contracture    Benign prostatic hyperplasia with urinary retention    Lipoma of skin and subcutaneous tissue      Possible Sleep Apnea: low suspicion, but high risk   STOP-Bang Total Score 12/23/2020   Total Score 5   Risk Stratification 5 - 8: High Risk for NE       Risks and Recommendations:  The patient has the following additional risks and recommendations for perioperative complications:  Cardiovascular:  Well-controlled HTN and lipids  Pulmonary:    - Incentive spirometry post-op    Medication Instructions:   - Bleeding risk is low for this procedure (e.g. dental, skin, cataract).   - ACE/ARB: HOLD due to exceptional risk of hypotension during surgery.     RECOMMENDATION:  APPROVAL GIVEN to proceed with proposed procedure, without further diagnostic evaluation.    Signed Electronically by: Timo Howard MD, MD    Copy of this evaluation report is provided to requesting physician.    [facility] Preop Guidelines    Revised Cardiac Risk Index

## 2020-12-23 NOTE — PATIENT INSTRUCTIONS

## 2020-12-29 DIAGNOSIS — N40.1 BENIGN PROSTATIC HYPERPLASIA WITH URINARY RETENTION: ICD-10-CM

## 2020-12-29 DIAGNOSIS — R33.8 BENIGN PROSTATIC HYPERPLASIA WITH URINARY RETENTION: ICD-10-CM

## 2020-12-30 RX ORDER — TAMSULOSIN HYDROCHLORIDE 0.4 MG/1
0.8 CAPSULE ORAL DAILY
Qty: 180 CAPSULE | Refills: 0 | Status: SHIPPED | OUTPATIENT
Start: 2020-12-30 | End: 2021-02-10

## 2020-12-30 NOTE — TELEPHONE ENCOUNTER
Prescription approved per [facility].  Kenia Diamond RN  December 30, 2020

## 2021-02-05 ENCOUNTER — TELEPHONE (OUTPATIENT)
Dept: FAMILY MEDICINE | Facility: OTHER | Age: 81
End: 2021-02-05

## 2021-02-05 DIAGNOSIS — I10 ESSENTIAL HYPERTENSION WITH GOAL BLOOD PRESSURE LESS THAN 140/90: ICD-10-CM

## 2021-02-05 RX ORDER — LOSARTAN POTASSIUM 25 MG/1
TABLET ORAL
Qty: 90 TABLET | Refills: 0 | Status: SHIPPED | OUTPATIENT
Start: 2021-02-05 | End: 2021-02-10

## 2021-02-05 NOTE — TELEPHONE ENCOUNTER
Reason for Call:  Medication or medication refill:    Do you use a [facility]?  Name of the pharmacy and phone number for the [facility]    Name of the medication requested: losartan 25 mg    Other request: refill please, ok to call when done    Can we leave a detailed message on this number? YES    Phone number patient can be reached at: Home number on file [phone] (home)    Best Time: Any    Call taken on 2/5/2021 at 2:16 PM by Gaby Medrano

## 2021-02-08 NOTE — TELEPHONE ENCOUNTER
Spoke to patient and relayed message. He asked for an appointment asap to discuss his BP and issues with his hand  Kimberly Felix CMA

## 2021-02-10 ENCOUNTER — OFFICE VISIT (OUTPATIENT)
Dept: FAMILY MEDICINE | Facility: OTHER | Age: 81
End: 2021-02-10
Payer: COMMERCIAL

## 2021-02-10 VITALS
HEART RATE: 72 BPM | TEMPERATURE: 97.7 F | SYSTOLIC BLOOD PRESSURE: 118 MMHG | BODY MASS INDEX: 35.87 KG/M2 | WEIGHT: 229 LBS | DIASTOLIC BLOOD PRESSURE: 82 MMHG | RESPIRATION RATE: 14 BRPM | OXYGEN SATURATION: 95 %

## 2021-02-10 DIAGNOSIS — R33.8 BENIGN PROSTATIC HYPERPLASIA WITH URINARY RETENTION: ICD-10-CM

## 2021-02-10 DIAGNOSIS — I10 ESSENTIAL HYPERTENSION WITH GOAL BLOOD PRESSURE LESS THAN 140/90: ICD-10-CM

## 2021-02-10 DIAGNOSIS — N40.1 BENIGN PROSTATIC HYPERPLASIA WITH URINARY RETENTION: ICD-10-CM

## 2021-02-10 DIAGNOSIS — M72.0 DUPUYTREN'S CONTRACTURE OF RIGHT HAND: Primary | ICD-10-CM

## 2021-02-10 DIAGNOSIS — E66.01 MORBID OBESITY (H): ICD-10-CM

## 2021-02-10 DIAGNOSIS — E78.5 HYPERLIPIDEMIA LDL GOAL <130: ICD-10-CM

## 2021-02-10 DIAGNOSIS — B35.1 ONYCHOMYCOSIS: ICD-10-CM

## 2021-02-10 PROCEDURE — 99214 OFFICE O/P EST MOD 30 MIN: CPT | Performed by: FAMILY MEDICINE

## 2021-02-10 RX ORDER — LOSARTAN POTASSIUM 25 MG/1
TABLET ORAL
Qty: 90 TABLET | Refills: 3 | Status: SHIPPED | OUTPATIENT
Start: 2021-02-10 | End: 2022-03-25

## 2021-02-10 RX ORDER — TAMSULOSIN HYDROCHLORIDE 0.4 MG/1
0.8 CAPSULE ORAL DAILY
Qty: 180 CAPSULE | Refills: 3 | Status: SHIPPED | OUTPATIENT
Start: 2021-02-10 | End: 2022-02-15

## 2021-02-10 RX ORDER — PRAVASTATIN SODIUM 40 MG
TABLET ORAL
Qty: 90 TABLET | Refills: 3 | Status: SHIPPED | OUTPATIENT
Start: 2021-02-10 | End: 2022-02-15

## 2021-02-10 NOTE — PATIENT INSTRUCTIONS
[facility]    You can go to the following website to schedule an appointment for your COVID vaccine.  [email or web address]    See ortho for your hand problem.      If your urinary symptoms get bad enough that you'd like to visit with urology, let me know.    If you decide to do Lamisil for your toenail, we can start that, but you'll need to do labs about a month in.      Contact us or return if questions or concerns.     Have a nice day!    Dr. Howard     Return in about 6 months (around 8/10/2021) for wellness exam..      If you need medication refills, please contact your pharmacy 3 days before your prescriptions runs out or download the [facility] kell for your smart phone.   If you are out of refills, your pharmacy will contact contact the clinic.                                     [facility] assistance [phone]

## 2021-02-10 NOTE — PROGRESS NOTES
"    Assessment & Plan       ICD-10-CM    1. Dupuytren's contracture of right hand  M72.0 Orthopedic & Spine  Referral   2. Essential hypertension with goal blood pressure less than 140/90  I10 losartan (COZAAR) 25 MG tablet   3. Benign prostatic hyperplasia with urinary retention  N40.1 tamsulosin (FLOMAX) 0.4 MG capsule    R33.8    4. Hyperlipidemia LDL goal <130  E78.5 pravastatin (PRAVACHOL) 40 MG tablet   5. Morbid obesity (H)  E66.01    6. Onychomycosis  B35.1       1.  Briefly reviewed the nature of this with patient.  Recommended orthopedic consultation for definitive management.  Referral was placed.  Patient was happy with this plan.  2.  Currently controlled.  Will continue current regimen check monitoring labs periodically.  3.  Currently under fair control.  Some residual symptoms.  I offered referral to urology for further evaluation and potential surgical interventions.  Patient is not amenable to this at this time.  He will let me know if this becomes more bothersome.  4.  Currently controlled.  Will continue current dosing of pravastatin.  5.  Encouraged lifestyle modifications.  Will follow and assist with this as able.  6.  Discussed the nature of this and options for treatment.  Patient was not interested in pursuing 3 months of oral antifungal therapy at this time.  He will let me know if this becomes more bothersome and wishes to pursue treatment.    Portions of this note were completed using Dragon dictation software.  Although reviewed, there may be typographical and other inadvertent errors that remain.       Review of the result(s) of each unique test - A1c, metabolic profile, lipids  36 minutes spent on the date of the encounter doing chart review, history and exam, documentation and further activities as noted above       BMI:   Estimated body mass index is 35.87 kg/m  as calculated from the following:    Height as of 5/21/20: 1.702 m (5' 7\").    Weight as of this encounter: 103.9 " kg (229 lb).   Weight management plan: Discussed healthy diet and exercise guidelines    Patient Instructions   Thank you for visiting [facility]    You can go to the following website to schedule an appointment for your COVID vaccine.  [email or web address]    See ortho for your hand problem.      If your urinary symptoms get bad enough that you'd like to visit with urology, let me know.    If you decide to do Lamisil for your toenail, we can start that, but you'll need to do labs about a month in.      Contact us or return if questions or concerns.     Have a nice day!    Dr. Howard     Return in about 6 months (around 8/10/2021) for wellness exam..      If you need medication refills, please contact your pharmacy 3 days before your prescriptions runs out or download the [facility] kell for your smart phone.   If you are out of refills, your pharmacy will contact contact the clinic.                                     [facility] assistance [phone]                       Return in about 6 months (around 8/10/2021) for wellness exam..    Timo Howard MD, MD  [facility] NAVEEN Reza is a 80 year old who presents for the following health issues     Pt happy with how he's seeing after cataract surgery.    Wondering about his toenail as well.    HPI       Hypertension Follow-up      Do you check your blood pressure regularly outside of the clinic? Yes     Are you following a low salt diet? Yes    Are your blood pressures ever more than 140 on the top number (systolic) OR more   than 90 on the bottom number (diastolic), for example 140/90? No      How many servings of fruits and vegetables do you eat daily?  0-1    On average, how many sweetened beverages do you drink each day (Examples: soda, juice, sweet tea, etc.  Do NOT count diet or artificially sweetened beverages)?   1    How many days per week do you exercise enough to  make your heart beat faster? 3 or less    How many minutes a day do you exercise enough to make your heart beat faster? 20 - 29    How many days per week do you miss taking your medication? 0      Urination is fair.  Does still have urgency.  Not yet ready to see urology.    Concern - mass right palm  Onset: months  Description: is not going away  Intensity: mild  Progression of Symptoms:  same  Accompanying Signs & Symptoms: pt also c/o stiffness bilateral hands thinks it might be carpal tunnel or if it is due to the mass  Previous history of similar problem: yes  Precipitating factors:        Worsened by: nothing  Alleviating factors:        Improved by: nothing  Therapies tried and outcome:  none     He notes an increasing lump in his right hand.  Has been present for a few years.  Gradually getting worse.  Getting pain that radiates up his forearm.          Review of Systems   Constitutional, HEENT, cardiovascular, pulmonary, gi and gu systems are negative, except as otherwise noted.  See above.        Objective    /82   Pulse 72   Temp 97.7  F (36.5  C) (Temporal)   Resp 14   Wt 103.9 kg (229 lb)   SpO2 95%   BMI 35.87 kg/m    Body mass index is 35.87 kg/m .  Physical Exam   GENERAL: healthy, alert and no distress  NECK: no adenopathy, no asymmetry, masses, or scars and thyroid normal to palpation  RESP: lungs clear to auscultation - no rales, rhonchi or wheezes  CV: regular rate and rhythm, normal S1 S2, no S3 or S4, no murmur, click or rub, no peripheral edema and peripheral pulses strong  ABDOMEN: soft, nontender, no hepatosplenomegaly, no masses and bowel sounds normal  MS: no gross musculoskeletal defects noted, no edema  RECTAL:  Prostate enlarged, but no asymmetry or masses.  No rectal abnormalities noted.    SKIN:  Thickened discolored great right toenail c/w onychomycosis    Office Visit on 12/23/2020   Component Date Value Ref Range Status     Sodium 12/23/2020 141  133 - 144 mmol/L Final      Potassium 12/23/2020 4.5  3.4 - 5.3 mmol/L Final     Chloride 12/23/2020 109  94 - 109 mmol/L Final     Carbon Dioxide 12/23/2020 30  20 - 32 mmol/L Final     Anion Gap 12/23/2020 2* 3 - 14 mmol/L Final     Glucose 12/23/2020 102* 70 - 99 mg/dL Final     Urea Nitrogen 12/23/2020 17  7 - 30 mg/dL Final     Creatinine 12/23/2020 0.73  0.66 - 1.25 mg/dL Final     GFR Estimate 12/23/2020 86  >60 mL/min/[1.73_m2] Final    Comment: Non  GFR Calc  Starting 12/18/2018, serum creatinine based estimated GFR (eGFR) will be   calculated using the Chronic Kidney Disease Epidemiology Collaboration   (CKD-EPI) equation.       GFR Estimate If Black 12/23/2020 >90  >60 mL/min/[1.73_m2] Final    Comment:  GFR Calc  Starting 12/18/2018, serum creatinine based estimated GFR (eGFR) will be   calculated using the Chronic Kidney Disease Epidemiology Collaboration   (CKD-EPI) equation.       Calcium 12/23/2020 9.6  8.5 - 10.1 mg/dL Final     Bilirubin Total 12/23/2020 0.6  0.2 - 1.3 mg/dL Final     Albumin 12/23/2020 4.1  3.4 - 5.0 g/dL Final     Protein Total 12/23/2020 7.6  6.8 - 8.8 g/dL Final     Alkaline Phosphatase 12/23/2020 64  40 - 150 U/L Final     ALT 12/23/2020 30  0 - 70 U/L Final     AST 12/23/2020 20  0 - 45 U/L Final     WBC 12/23/2020 8.5  4.0 - 11.0 10e9/L Final     RBC Count 12/23/2020 5.07  4.4 - 5.9 10e12/L Final     Hemoglobin 12/23/2020 15.5  13.3 - 17.7 g/dL Final     Hematocrit 12/23/2020 46.5  40.0 - 53.0 % Final     MCV 12/23/2020 92  78 - 100 fl Final     MCH 12/23/2020 30.6  26.5 - 33.0 pg Final     MCHC 12/23/2020 33.3  31.5 - 36.5 g/dL Final     RDW 12/23/2020 13.2  10.0 - 15.0 % Final     Platelet Count 12/23/2020 148* 150 - 450 10e9/L Final     Cholesterol 12/23/2020 152  <200 mg/dL Final     Triglycerides 12/23/2020 158* <150 mg/dL Final    Comment: Borderline high:  150-199 mg/dl  High:             200-499 mg/dl  Very high:       >499 mg/dl       HDL Cholesterol  12/23/2020 39* >39 mg/dL Final     LDL Cholesterol Calculated 12/23/2020 81  <100 mg/dL Final    Desirable:       <100 mg/dl     Non HDL Cholesterol 12/23/2020 113  <130 mg/dL Final

## 2021-02-18 ENCOUNTER — TELEPHONE (OUTPATIENT)
Dept: FAMILY MEDICINE | Facility: OTHER | Age: 81
End: 2021-02-18

## 2021-02-18 ENCOUNTER — OFFICE VISIT (OUTPATIENT)
Dept: ORTHOPEDICS | Facility: OTHER | Age: 81
End: 2021-02-18
Payer: COMMERCIAL

## 2021-02-18 VITALS
SYSTOLIC BLOOD PRESSURE: 153 MMHG | HEIGHT: 67 IN | DIASTOLIC BLOOD PRESSURE: 72 MMHG | RESPIRATION RATE: 16 BRPM | HEART RATE: 90 BPM | BODY MASS INDEX: 35.94 KG/M2 | WEIGHT: 229 LBS

## 2021-02-18 DIAGNOSIS — G56.01 CARPAL TUNNEL SYNDROME OF RIGHT WRIST: Primary | ICD-10-CM

## 2021-02-18 DIAGNOSIS — M72.0 DUPUYTREN'S CONTRACTURE OF RIGHT HAND: ICD-10-CM

## 2021-02-18 PROCEDURE — 99203 OFFICE O/P NEW LOW 30 MIN: CPT | Performed by: ORTHOPAEDIC SURGERY

## 2021-02-18 ASSESSMENT — MIFFLIN-ST. JEOR: SCORE: 1697.37

## 2021-02-18 NOTE — TELEPHONE ENCOUNTER
Reason for Call:  Other call back    Detailed comments: Patient is wondering if he still needs to do a colonoscopy at his age.  Please call    Phone Number Patient can be reached at: Home number on file [phone] (home)    Best Time: any    Can we leave a detailed message on this number? YES    Call taken on 2/18/2021 at 8:19 AM by Lizeth Reyes

## 2021-02-18 NOTE — PATIENT INSTRUCTIONS
Juaquin to follow up with Primary Care provider regarding elevated blood pressure.    You have been referred to Dr. Hussein Grace MD to address your Carpal Tunnel Symptoms and Dupuytren's contracture    You have been referred to a surgery or provider who works out of the [facility] in [place]:

## 2021-02-18 NOTE — TELEPHONE ENCOUNTER
The basic guideline is to consider doing the colonoscopy if you are anticipated to live at least 10 years with good quality of life.  If not, then the colonoscopy is probably not necessary.  You do seem to be in good health, so the likelihood that you will live 10 years is pretty good, but if you wish to not pursue colonoscopy at this time, I think that would be reasonable.

## 2021-02-18 NOTE — PROGRESS NOTES
ORTHOPEDIC CLINIC CONSULT    HPI: Patient reports for months to years he has right hand carpal tunnel symptoms have returned.  Reports it is often noticeable when he is driving as well as when he is working on puzzle books.  He reports the first 3 fingers do get numb.  He has had surgery twice on his right for carpal tunnel.  The most recent surgery was in 2011 by Dr. Javon Edward.  His op note also indicates that a right middle and right ring trigger fingers were released.  He also notes that this was a second surgery for the patient for carpal tunnel release.  Patient also reports a painful nodular area at the right left palmar surface below the pinky.  This too has been present for years.    Referred by: Dr. Howard    Chief Complaint: Juaquin Shea is a 80 year old male who is being seen for   Chief Complaint   Patient presents with     Consult For     Right Dupuytrens contracture.     History of Present Illness:   Mechanism of Injury: No trauma or inciting event.  Location: right palm volar  Duration of Pain:    years(s)  Rating of Pain:  moderate.    Pain Quality: sharp, electric shock and pressure, occasional burning pain at site of nodule  Pain is better with: shaking it off or placing pressure  Pain is worse with:  bending, repetitive tasks, driving and working on puzzle books  Treatment so far consists of:  Previous surgery for carpal tunnel release x 2 and trigger fingers.   Prior history of related problems:  YES    Has had surgery in the affected area before.  Per notes also appears a right hand mass excised in 2013 per Dr. Moore and 2011 Trigger release of middle and ring finger and repeat carpal tunnel release of right hand  Pain is limiting normal activities of daily living.  Here for Orthopedic consultation.    Patient's past medical, surgical, social and family histories reviewed.       Past Medical History:   Diagnosis Date     Benign neoplasm of colon 2007    adenomatous colon polyps removed      Degeneration of cervical intervertebral disc 2002    multi-level     Degeneration of lumbar or lumbosacral intervertebral disc 2007    multi-level     Erectile dysfunction      Hypertrophy of breast 2004    benign US consistent with gynecomastia     Impaired fasting glucose 2006    fasting      LVH (left ventricular hypertrophy) 5/17/10    suspected, by voltage criteria on ECG     Microalbuminuria 5/18/2010     Nonspecific abnormal results of liver function study 2006         Obesity      Other and unspecified hyperlipidemia 2006     Spinal stenosis, lumbar region, without neurogenic claudication 2007    mod-severe at L2-3 on MRI     Tinnitus 5/17/2010       Past Surgical History:   Procedure Laterality Date     ARTHROPLASTY KNEE  2/20/2012    hemiarthroplasty right knee     ARTHROPLASTY KNEE  9/30/2013    Procedure: ARTHROPLASTY KNEE;  Left Total Knee Arthroplasty;  Surgeon: Jose D Siegel MD;  Location: [facility]     COLONOSCOPY  03/27/07, 06/08/10     COLONOSCOPY N/A 6/16/2015    Procedure: COLONOSCOPY;  Surgeon: Yg Rendon MD;  Location: [facility]     EXCISE MASS HAND  3/29/2013    Procedure: EXCISE MASS HAND;  excision right hand mass;  Surgeon: Rafa Moore MD;  Location: [facility] OR     HC COLONOSCOPY W/WO BRUSH/WASH  2000     HC COLONOSCOPY W/WO BRUSH/WASH  2004     HC REPAIR ING HERNIA,5+Y/O,REDUCIBL      X2     HC REVISE MEDIAN N/CARPAL TUNNEL SURG  5/11/2000    left wrist     HC REVISE MEDIAN N/CARPAL TUNNEL SURG  7/21/1994    right wrist     RELEASE CARPAL TUNNEL  12/8/2011    Procedure:RELEASE CARPAL TUNNEL; right carpal tunnel release, right ring and middle finger A-1 pulley releases    ; Surgeon:BRARETT CURRY; Location:[facility] OR     RELEASE TRIGGER FINGER  12/8/2011    Procedure:RELEASE TRIGGER FINGER; Surgeon:BARRETT CURRY; Location: OR       Home Medications:  Prior to Admission medications    Medication Sig Start Date End Date Taking? Authorizing Provider   ascorbic acid  (VITAMIN C) 1000 MG TABS Take 1 tablet by mouth daily. 9/17/10   Yg Morgan PA-C   aspirin 81 MG tablet Take 1 tablet by mouth daily. 9/17/10   Yg Morgan PA-C   losartan (COZAAR) 25 MG tablet TAKE 1 TABLET EVERY DAY 2/10/21   Timo Howard MD   Multiple Vitamins-Minerals (MULTIVITAMIN ADULT PO)     Reported, Patient   pravastatin (PRAVACHOL) 40 MG tablet TAKE 1 TABLET EVERY DAY 2/10/21   Timo Howard MD   tamsulosin (FLOMAX) 0.4 MG capsule Take 2 capsules (0.8 mg) by mouth daily 2/10/21   Timo Howard MD       Allergies   Allergen Reactions     No Known Drug Allergies        Social History     Occupational History     Occupation: retired   Tobacco Use     Smoking status: Former Smoker     Packs/day: 0.50     Years: 15.00     Pack years: 7.50     Quit date: 1981     Years since quittin.1     Smokeless tobacco: Former User     Types: Chew     Quit date: 2/10/2017     Tobacco comment: quit in    Substance and Sexual Activity     Alcohol use: Yes     Comment: beer occ     Drug use: No     Sexual activity: Yes     Partners: Female       Family History   Problem Relation Age of Onset     Heart Disease Mother         doctored for heart problems at young age     Prostate Cancer Father         age 70s     Stomach Cancer Brother         Cancer free now for over 10 years (T: 8/10/17)     Prostate Cancer Brother      Other - See Comments Brother         [place] virus fighting for about 6 weeks 10/08/18     Respiratory Brother         NE, uses CPAP     Cancer - colorectal No family hx of        REVIEW OF SYSTEMS    General: Negative for fever or fatigue    Psych:  negative anxiety or depression     Ophthalmic:  Corrective lenses?  YES    ENT:  Hearing difficulty? No    CV: negative for chest pain, venous insufficiency, no history of MI or stroke    Endocrine:  negative diabetes     Urology:  negative kidney disease    Resp:  Normal respiratory effort, no history  "of pulmonary disease or asthma    Skin: negative for cuts/sores or redness    Musculoskeletal: as above    Neurologic:positive for numbness/tingling    Hematologic: negative for bleeding disorder, does not use of prescription anticoagulants         Physical Exam:    Vitals: BP (!) 153/72   Pulse 90   Resp 16   Ht 1.702 m (5' 7\")   Wt 103.9 kg (229 lb)   BMI 35.87 kg/m    BMI= Body mass index is 35.87 kg/m .    GENERAL APPEARANCE: Healthy, alert, no distress    SKIN:  negative for suspicious lesions or rashes    NEURO: Normal strength and tone, mentation intact and speech normal    PSYCH:   Mentation appears Normal and affect normal/bright    RESPIRATORY: negative for respiratory distress.    EYES: negative for Conjunctivitis    Cardiovascular: No vascular discoloration;  Fingers warm and well perfused, brisk capillary refill.    JOINT/EXTREMITIES:  right Hand/Finger Exam: Inspection: nodule palpated volar brown surface proximal to the pinky.   This appears softer and less attached to skin than a typical Dupuytren's contracture. Transverse incision scar upper 3rd of palmar surface near index.  Mildly swolllen wrist.  Tender: brown nodule tender to touch.    Positive Tinels at wrist.  Well healed wrist scar for carpal tunnel release.  Range of Motion full extension and flexion of all fingers of right hand.  Symmetrical range of motion at wrist.  Fingers cannot hyperextend at metacarpal phalangeal joints.      Impression:      ICD-10-CM    1. 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  G56.01 Orthopedic & Spine  Referral    Has had CTR x 2.  Most recent 2011   2. Dupuytren's contracture of right hand  M72.0 Orthopedic & Spine  Referral     Orthopedic & Spine  Referral     Patient with recurrent carpal tunnel symptoms despite surgery 2 times.  Patient also with palmar nodule at the palmar base of the pinky.  There is minimal contracture. I am not certain this is Dupuytren's " contracture, as it feels softer and less adherent to skin than typical.    Plan:  All of the above pertinent physical exam and imaging modalities findings was reviewed with Juaquin.  Plan is for Ortho  referral to a hand surgeon to address recurrent carpal tunnel symptoms as well as palmar nodule?  Dupuytren's contracture to the palmar surface near the pinky.  Surgeon we would like to refer to is Dr. Hussein Grace out of [place].  Patient would like carpal tunnel symptoms addressed.  May consider Xiaflex  injection for early Dupuytren's symptoms.    BP Readings from Last 1 Encounters:   02/18/21 (!) 153/72       BP noted to be elevated today in office.  Patient to follow up with Primary Care provider regarding elevated blood pressure.    Scribed by:  Muna Herrera PA-C   2/18/2021  9:10 AM    The information in this document, created by a scribe for me, accurately reflects the services I personally performed and the decisions made by me. I have reviewed and approved this document for accuracy.    Dr. Michel Garcia MD

## 2021-02-23 ENCOUNTER — OFFICE VISIT (OUTPATIENT)
Dept: ORTHOPEDICS | Facility: CLINIC | Age: 81
End: 2021-02-23
Payer: COMMERCIAL

## 2021-02-23 DIAGNOSIS — M72.0 DUPUYTREN'S CONTRACTURE OF RIGHT HAND: ICD-10-CM

## 2021-02-23 DIAGNOSIS — G56.01 CARPAL TUNNEL SYNDROME OF RIGHT WRIST: ICD-10-CM

## 2021-02-23 PROCEDURE — 99203 OFFICE O/P NEW LOW 30 MIN: CPT | Performed by: PLASTIC SURGERY

## 2021-02-23 NOTE — PROGRESS NOTES
"CONSULT NOTE      REFERRING PROVIDER:  Muna Herrera PA-C      PRESENTING COMPLAINT:  Consultation for right palm mass and right hand numbness, tingling.      HISTORY OF PRESENTING COMPLAINT:  Mr. Shea is 80 years old.  He is right hand dominant.  He has a number of issues.  First, he has a lump in the right palm that has been there for 2 years.  It has not grown in any way.  It does not cause him any pain.  It is not tender.  Here to have it looked at.  Second, he has numbness, tingling in the median innervated fingers of his hands for the last \"6 months.\"  However, on further talking to the patient, it seems like he had numbness, tingling in both hands for over 20 years.  He has had carpal tunnel release done twice, one in 2000 and one in 2011.  Both times he says they improved his symptoms and these symptoms are now recurring, mostly in the median innervated fingers, but the ulnar innervated finger also gets affected now and again.  He has no neck pain, no radiculopathy, no trauma to his wrist, per se, but has a lot of arthritis in his wrists and hands.  Here to have it looked at.      PAST MEDICAL HISTORY:  Hypertension, prostatism.      PAST SURGICAL HISTORY:  Bilateral carpal tunnel release, hernia repair.      MEDICATIONS:  Losartan, pravastatin.      ALLERGIES:  Nil.      SOCIAL HISTORY:  He smoked a pack a day for multiple years, quit in 1981.  Drinks socially.  Lives in [place].  He was a .      REVIEW OF SYSTEMS:  Denies chest pain, shortness of breath, MI, CVA, DVT and PE.      PHYSICAL EXAMINATION:  Vital signs are stable.  He is afebrile, in no obvious distress.  On examination of his right hand, he has about a 1 cm mass that is in the subcutaneous portion of his right palm in line with the ring/little finger.  It is nontender.  He has gross swelling of his wrists, decreased range of motion, stiffness.  He is numb in the median innervated fingers.  Tinel is negative.  Carpal compression is " negative.  Phalen is negative.  He has no obvious thenar atrophy.      ASSESSMENT AND PLAN:  Based on above findings, a diagnosis of a history of bilateral carpal tunnel release done twice with continuing bilateral numbness, tingling of the hands along with a benign cyst of the palm of the hand was made.  Given the mass has been there for 2 years, has not grown in any way and does not cause him any symptoms, I do not think anything needs to be done.  He can follow this conservatively.  If it does grow or change in nature, excision is warranted.  With regards to the numbness/tingling in the hands, I think he needs a nerve conduction test, and based on those findings, we can proceed as indicated.  I will see him back once the tests are done.      Total time spent with pre-visit chart review, the visit itself and post-visit paperwork was 30 minutes.      cc:   Muna Herrera PA-C   [place] [id number] [facility] [place] [place]  [address]

## 2021-02-23 NOTE — LETTER
"    2/23/2021         RE: Juaquin Shea  [address]  [facility] [address]        Dear Colleague,    Thank you for referring your patient, Juaquin Shea, to the [facility]. Please see a copy of my visit note below.    CONSULT NOTE      REFERRING PROVIDER:  Muna Herrera PA-C      PRESENTING COMPLAINT:  Consultation for right palm mass and right hand numbness, tingling.      HISTORY OF PRESENTING COMPLAINT:  Mr. Shea is 80 years old.  He is right hand dominant.  He has a number of issues.  First, he has a lump in the right palm that has been there for 2 years.  It has not grown in any way.  It does not cause him any pain.  It is not tender.  Here to have it looked at.  Second, he has numbness, tingling in the median innervated fingers of his hands for the last \"6 months.\"  However, on further talking to the patient, it seems like he had numbness, tingling in both hands for over 20 years.  He has had carpal tunnel release done twice, one in 2000 and one in 2011.  Both times he says they improved his symptoms and these symptoms are now recurring, mostly in the median innervated fingers, but the ulnar innervated finger also gets affected now and again.  He has no neck pain, no radiculopathy, no trauma to his wrist, per se, but has a lot of arthritis in his wrists and hands.  Here to have it looked at.      PAST MEDICAL HISTORY:  Hypertension, prostatism.      PAST SURGICAL HISTORY:  Bilateral carpal tunnel release, hernia repair.      MEDICATIONS:  Losartan, pravastatin.      ALLERGIES:  Nil.      SOCIAL HISTORY:  He smoked a pack a day for multiple years, quit in 1981.  Drinks socially.  Lives in [place].  He was a .      REVIEW OF SYSTEMS:  Denies chest pain, shortness of breath, MI, CVA, DVT and PE.      PHYSICAL EXAMINATION:  Vital signs are stable.  He is afebrile, in no obvious distress.  On examination of his right hand, he has about a 1 cm mass that is in the " subcutaneous portion of his right palm in line with the ring/little finger.  It is nontender.  He has gross swelling of his wrists, decreased range of motion, stiffness.  He is numb in the median innervated fingers.  Tinel is negative.  Carpal compression is negative.  Phalen is negative.  He has no obvious thenar atrophy.      ASSESSMENT AND PLAN:  Based on above findings, a diagnosis of a history of bilateral carpal tunnel release done twice with continuing bilateral numbness, tingling of the hands along with a benign cyst of the palm of the hand was made.  Given the mass has been there for 2 years, has not grown in any way and does not cause him any symptoms, I do not think anything needs to be done.  He can follow this conservatively.  If it does grow or change in nature, excision is warranted.  With regards to the numbness/tingling in the hands, I think he needs a nerve conduction test, and based on those findings, we can proceed as indicated.  I will see him back once the tests are done.      Total time spent with pre-visit chart review, the visit itself and post-visit paperwork was 30 minutes.      cc:   Muna Herrera PA-C   [facility]   [address] [place]  [address]           Again, thank you for allowing me to participate in the care of your patient.        Sincerely,        GEENA Grace MD

## 2021-03-29 ENCOUNTER — OFFICE VISIT (OUTPATIENT)
Dept: NEUROLOGY | Facility: CLINIC | Age: 81
End: 2021-03-29
Attending: PLASTIC SURGERY
Payer: COMMERCIAL

## 2021-03-29 DIAGNOSIS — G56.01 CARPAL TUNNEL SYNDROME OF RIGHT WRIST: ICD-10-CM

## 2021-03-29 DIAGNOSIS — G56.03 BILATERAL CARPAL TUNNEL SYNDROME: Primary | ICD-10-CM

## 2021-03-29 PROCEDURE — 95885 MUSC TST DONE W/NERV TST LIM: CPT | Performed by: PSYCHIATRY & NEUROLOGY

## 2021-03-29 PROCEDURE — 95913 NRV CNDJ TEST 13/> STUDIES: CPT | Performed by: PSYCHIATRY & NEUROLOGY

## 2021-03-29 NOTE — PROGRESS NOTES
[facility]   EMG Laboratory      Nerve Conduction & EMG Report          Patient:       Juaquin Shea  Patient ID:    [id number]  Gender:        Male  YOB: 1940  Age:           80 Years 9 Months      History and Examination:  Juaquin Shea is an 80 year old man with numbness in both hands that is exacerbated by use. He denies pain in the proximal upper limbs or radicular symptoms. Past history is notable for bilateral carpal tunnel release. Examination is notable for equivocal atrophy and weakness of the right thenar eminence, where symptoms are more prominent. He is referred for evaluation of possible bilateral upper limb entrapments.     Techniques:  Motor conduction studies were done with surface recording electrodes. Sensory conduction studies were performed with surface electrodes, unless indicated otherwise by (n), designating the use of subdermal recording electrodes. Temperature was monitored and recorded throughout the study. Upper extremities were maintained at a temperature of 32 degrees Centigrade or higher.  EMG was done with a concentric needle electrode.     Results:  Median sensory conduction studies demonstrated severe attenuation of amplitude and severe slowing of conduction bilaterally. A left ulnar digital sensory conduction study was normal. A right ulnar digital sensory conduction study demonstrated moderately severe attenuation of amplitude. Median and ulnar palmar conduction studies demonstrated severe and moderate slowing of conduction in median and ulnar nerves, respectively, in both upper limbs. Left radial sensory conduction studies demonstrated mild to moderate attenuation of amplitude. Median motor conduction studies demonstrated moderate prolongation of all distal latencies bilaterally, slightly worse on the right. Left ulnar motor conduction studies demonstrated moderately severe conduction slowing across the elbow. Right ulnar motor conduction studies  demonstrated mild relative conduction slowing across the elbow only when recording from abductor digiti minimi. Limited electromyography of the distal right upper limb demonstrated fibrillation potentials, fasciculation potentials, and evidence of motor unit remodelling in the right abductor pollicis brevis.    Interpretation:  This is an abnormal study, demonstrating electrophysiologic evidence of the followin. 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 Note that residual electrodiagnostic findings can persist after successful carpal tunnel release.  2. Moderate left ulnar neuropathy at the elbow.  3. Possible mild right ulnar neuropathy at the elbow; findings at this site are not diagnostic.  4. Possible mild bilateral ulnar neuropathy at the wrist.   5. 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       Michel Alanis MD        Sensory NCS      Nerve / Sites Rec. Site Onset Peak Ref. NP Amp Ref. PP Amp Dist Cliff Ref. Temp     ms ms ms  V  V  V cm m/s m/s  C   L MEDIAN - Dig II Anti      Wrist Dig II 4.95 5.68  3.1 10.0 4.7 14 28.3 48.0 32.7   R MEDIAN - Dig II Anti      Wrist Dig II 5.21 6.41  3.6 10.0 8.2 14 26.9 48.0 34   L ULNAR - Dig V Anti      Wrist Dig V 2.60 3.65  10.7 8.0 17.3 12.5 48.0 48.0 32.7   R ULNAR - Dig V Anti      Wrist Dig V 2.60 3.39  4.2 8.0 6.4 12.5 48.0 48.0 34.1   L RADIAL - Snuff      Forearm Snuff 1.82 2.71  12.5 15.0 15.0 10 54.9 48.0 32.4      Forearm Snuff 2.19 2.66 3.00 5.6  4.1 12.5 57.1  32.6   L MEDIAN - Ulnar - Palmar      Median Wrist 3.18 3.75 2.40 8.2  4.6 8 25.2  32.9      Ulnar Wrist 1.98 2.45 2.40 4.0  10.8 8 40.4  33   R MEDIAN - Ulnar - Palmar      Median Wrist 2.86 3.75 2.40 10.1  8.7 8 27.9  34      Ulnar Wrist 1.93 2.29 2.40 1.4  5.0 8 41.5  34.4       Motor NCS      Nerve / Sites Rec. Site Lat Ref. Amp Ref. Rel Amp Dist Cliff Ref. Dur. Area Temp.     ms ms mV mV % cm m/s m/s ms %  C   L MEDIAN - APB      Wrist APB  5.99 4.40 6.9 5.0 100 8   7.34 100 33.1      Elbow APB 10.10  6.4  92.6 20 48.6 48.0 7.55 95.9 33.1   R MEDIAN - APB      Wrist APB 6.25 4.40 6.4 5.0 100 8   5.83 100 33.9      Elbow APB 11.04  5.6  87.2 23 48.0 48.0 5.78 87.2 34   L ULNAR - ADM      Wrist ADM 2.92 3.50 7.7 5.0 100 8   6.25 100 33      B.Elbow ADM 6.41  7.8  102 22 63.0 48.0 6.87 96.1 33      A.Elbow ADM 8.44  7.9  104 9 44.3 48.0 6.88 91.5 33   R ULNAR - ADM      Wrist ADM 3.07 3.50 9.4 5.0 100 8   5.05 100 33.6      B.Elbow ADM 6.46  8.7  92.8 22 65.0 48.0 5.52 100 33.8      A.Elbow ADM 8.28  8.4  89.4 9 49.4 48.0 5.68 99.9 33.8   L MEDIAN - II Lumb      Median II Lumb 5.00  1.7  100 10   5.94 100 32.5      Ulnar Palm Int 3.75  2.6  153 10   5.10 116 32.9   R MEDIAN - II Lumb      Median II Lumb 5.68  0.8  100 10   5.10 100 33.1      Ulnar Palm Int 3.75  1.2  143 10   4.32 99.9 33.4   L ULNAR - FDI      Wrist FDI 4.11  8.4  100    5.16 100 33.1      B.Elbow FDI 7.50  8.3  99.5 22 65.0  5.47 101 33      A.Elbow FDI 9.79  8.1  96 9 39.3  5.52 103 33   R ULNAR - FDI      Wrist FDI 4.06  10.4  100    4.74 100 33.9      B.Elbow FDI 7.76  9.7  93.8 22 59.5  5.05 99.7 34      A.Elbow FDI 9.38  9.4  90.6 9 55.7  4.95 96.9 34       EMG Summary Table     Spontaneous MUAP Recruitment    IA Fib PSW Fasc H.F. Amp Dur. PPP Pattern   R. FIRST D INTEROSS N None None None None N N N N   R. ABD POLL BREVIS N 2+ None 1+ None 1+ 1+ N Moderately Reduced   R. PRON TERES N None None None None N N N N

## 2021-03-29 NOTE — LETTER
3/29/2021         RE: Juaquin Shea  [address]  [facility] [address]        Dear Colleague,    Thank you for referring your patient, Juaquin Shea, to the [facility] NEUROLOGY [facility]IN[facility] [place]. Please see a copy of my visit note below.    [facility]   EMG Laboratory      Nerve Conduction & EMG Report          Patient:       Juaquin Shea  Patient ID:    [id number]  Gender:        Male  YOB: 1940  Age:           80 Years 9 Months      History and Examination:  Juaquin Shea is an 80 year old man with numbness in both hands that is exacerbated by use. He denies pain in the proximal upper limbs or radicular symptoms. Past history is notable for bilateral carpal tunnel release. Examination is notable for equivocal atrophy and weakness of the right thenar eminence, where symptoms are more prominent. He is referred for evaluation of possible bilateral upper limb entrapments.     Techniques:  Motor conduction studies were done with surface recording electrodes. Sensory conduction studies were performed with surface electrodes, unless indicated otherwise by (n), designating the use of subdermal recording electrodes. Temperature was monitored and recorded throughout the study. Upper extremities were maintained at a temperature of 32 degrees Centigrade or higher.  EMG was done with a concentric needle electrode.     Results:  Median sensory conduction studies demonstrated severe attenuation of amplitude and severe slowing of conduction bilaterally. A left ulnar digital sensory conduction study was normal. A right ulnar digital sensory conduction study demonstrated moderately severe attenuation of amplitude. Median and ulnar palmar conduction studies demonstrated severe and moderate slowing of conduction in median and ulnar nerves, respectively, in both upper limbs. Left radial sensory conduction studies demonstrated mild to moderate attenuation of amplitude. Median motor  conduction studies demonstrated moderate prolongation of all distal latencies bilaterally, slightly worse on the right. Left ulnar motor conduction studies demonstrated moderately severe conduction slowing across the elbow. Right ulnar motor conduction studies demonstrated mild relative conduction slowing across the elbow only when recording from abductor digiti minimi. Limited electromyography of the distal right upper limb demonstrated fibrillation potentials, fasciculation potentials, and evidence of motor unit remodelling in the right abductor pollicis brevis.    Interpretation:  This is an abnormal study, demonstrating electrophysiologic evidence of the followin. 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 Note that residual electrodiagnostic findings can persist after successful carpal tunnel release.  2. Moderate left ulnar neuropathy at the elbow.  3. Possible mild right ulnar neuropathy at the elbow; findings at this site are not diagnostic.  4. Possible mild bilateral ulnar neuropathy at the wrist.   5. 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       Michel Alanis MD        Sensory NCS      Nerve / Sites Rec. Site Onset Peak Ref. NP Amp Ref. PP Amp Dist Cliff Ref. Temp     ms ms ms  V  V  V cm m/s m/s  C   L MEDIAN - Dig II Anti      Wrist Dig II 4.95 5.68  3.1 10.0 4.7 14 28.3 48.0 32.7   R MEDIAN - Dig II Anti      Wrist Dig II 5.21 6.41  3.6 10.0 8.2 14 26.9 48.0 34   L ULNAR - Dig V Anti      Wrist Dig V 2.60 3.65  10.7 8.0 17.3 12.5 48.0 48.0 32.7   R ULNAR - Dig V Anti      Wrist Dig V 2.60 3.39  4.2 8.0 6.4 12.5 48.0 48.0 34.1   L RADIAL - Snuff      Forearm Snuff 1.82 2.71  12.5 15.0 15.0 10 54.9 48.0 32.4      Forearm Snuff 2.19 2.66 3.00 5.6  4.1 12.5 57.1  32.6   L MEDIAN - Ulnar - Palmar      Median Wrist 3.18 3.75 2.40 8.2  4.6 8 25.2  32.9      Ulnar Wrist 1.98 2.45 2.40 4.0  10.8 8 40.4  33   R MEDIAN - Ulnar - Palmar      Median Wrist  2.86 3.75 2.40 10.1  8.7 8 27.9  34      Ulnar Wrist 1.93 2.29 2.40 1.4  5.0 8 41.5  34.4       Motor NCS      Nerve / Sites Rec. Site Lat Ref. Amp Ref. Rel Amp Dist Cliff Ref. Dur. Area Temp.     ms ms mV mV % cm m/s m/s ms %  C   L MEDIAN - APB      Wrist APB 5.99 4.40 6.9 5.0 100 8   7.34 100 33.1      Elbow APB 10.10  6.4  92.6 20 48.6 48.0 7.55 95.9 33.1   R MEDIAN - APB      Wrist APB 6.25 4.40 6.4 5.0 100 8   5.83 100 33.9      Elbow APB 11.04  5.6  87.2 23 48.0 48.0 5.78 87.2 34   L ULNAR - ADM      Wrist ADM 2.92 3.50 7.7 5.0 100 8   6.25 100 33      B.Elbow ADM 6.41  7.8  102 22 63.0 48.0 6.87 96.1 33      A.Elbow ADM 8.44  7.9  104 9 44.3 48.0 6.88 91.5 33   R ULNAR - ADM      Wrist ADM 3.07 3.50 9.4 5.0 100 8   5.05 100 33.6      B.Elbow ADM 6.46  8.7  92.8 22 65.0 48.0 5.52 100 33.8      A.Elbow ADM 8.28  8.4  89.4 9 49.4 48.0 5.68 99.9 33.8   L MEDIAN - II Lumb      Median II Lumb 5.00  1.7  100 10   5.94 100 32.5      Ulnar Palm Int 3.75  2.6  153 10   5.10 116 32.9   R MEDIAN - II Lumb      Median II Lumb 5.68  0.8  100 10   5.10 100 33.1      Ulnar Palm Int 3.75  1.2  143 10   4.32 99.9 33.4   L ULNAR - FDI      Wrist FDI 4.11  8.4  100    5.16 100 33.1      B.Elbow FDI 7.50  8.3  99.5 22 65.0  5.47 101 33      A.Elbow FDI 9.79  8.1  96 9 39.3  5.52 103 33   R ULNAR - FDI      Wrist FDI 4.06  10.4  100    4.74 100 33.9      B.Elbow FDI 7.76  9.7  93.8 22 59.5  5.05 99.7 34      A.Elbow FDI 9.38  9.4  90.6 9 55.7  4.95 96.9 34       EMG Summary Table     Spontaneous MUAP Recruitment    IA Fib PSW Fasc H.F. Amp Dur. PPP Pattern   R. FIRST D INTEROSS N None None None None N N N N   R. ABD POLL BREVIS N 2+ None 1+ None 1+ 1+ N Moderately Reduced   R. PRON TERES N None None None None N N N N                                            Again, thank you for allowing me to participate in the care of your patient.        Sincerely,        Michel Alanis MD

## 2021-04-06 ENCOUNTER — OFFICE VISIT (OUTPATIENT)
Dept: ORTHOPEDICS | Facility: CLINIC | Age: 81
End: 2021-04-06
Payer: COMMERCIAL

## 2021-04-06 DIAGNOSIS — G56.01 CARPAL TUNNEL SYNDROME OF RIGHT WRIST: Primary | ICD-10-CM

## 2021-04-06 PROCEDURE — 99213 OFFICE O/P EST LOW 20 MIN: CPT | Performed by: PLASTIC SURGERY

## 2021-04-06 NOTE — PATIENT INSTRUCTIONS
Thanks for coming today.  Ortho/Sports Medicine Clinic  [address] [place] [address]    To schedule future appointments in Ortho Clinic, you may call [phone].    To schedule ordered imaging by your provider:   Call Central [facility] [phone]    To schedule an injection ordered by your provider:  Call Central [facility] Injection scheduling line: [phone]  [facility] available online at:  [email or web address]    Please call if any further questions or concerns ([phone]).  Clinic hours 8 am to 5 pm.    Return to clinic (call) if symptoms worsen or fail to improve.

## 2021-04-06 NOTE — PROGRESS NOTES
FOLLOWUP VISIT NOTE      PRESENTING COMPLAINT:  Followup visit for bilateral numbness/tingling of the hands.      HISTORY OF PRESENTING COMPLAINT:  Mr. Shea is 80 years old.  Has a very complicated hand history.  Has had multiple carpal tunnel releases in the past.  We got a nerve conduction test done to get a baseline of what his hands' nerves look like and basically, it showed moderately severe bilateral median neuropathy at the wrist, worse on the right than on the left but these findings can persist after successful carpal tunnel releases.  Additionally, he had moderate left ulnar neuropathy at the elbow and right mild ulnar neuropathy at the elbow and possible mild ulnar neuropathy at the right wrist.  Also, had underlying sensory polyneuropathy.  The mass in his hand has remained stable and not symptomatic.      ASSESSMENT AND PLAN:  Based on above findings, a diagnosis of complicated polyneuropathy of both hands and a stable mass in the right hand was made.  With regards to the mass, as long as it remains stable and asymptomatic, conservative therapy is all that is required.  If it changes in any way, I think excision is warranted.  He understood and agreed.  With regards to polyneuropathy, my advice is to see Neurology.  Only if they sincerely feel that any further surgery is indicated, I am happy to do so with the knowledge that I cannot guarantee symptoms will go away, especially for the third time doing a carpal tunnel release and also has a higher risk for injuring the underlying nerves.  They will think about it and let me know.  I will see him back as needed.      Total time spent with chart review, the visit itself and post-visit paperwork was 20 minutes.

## 2021-08-22 ENCOUNTER — HOSPITAL ENCOUNTER (EMERGENCY)
Facility: CLINIC | Age: 81
Discharge: HOME | End: 2021-08-22
Attending: EMERGENCY MEDICINE | Admitting: EMERGENCY MEDICINE
Payer: COMMERCIAL

## 2021-08-22 VITALS
RESPIRATION RATE: 18 BRPM | BODY MASS INDEX: 36.18 KG/M2 | DIASTOLIC BLOOD PRESSURE: 77 MMHG | TEMPERATURE: 97.9 F | WEIGHT: 231 LBS | HEART RATE: 64 BPM | SYSTOLIC BLOOD PRESSURE: 151 MMHG | OXYGEN SATURATION: 95 %

## 2021-08-22 DIAGNOSIS — R33.9 URINARY RETENTION: ICD-10-CM

## 2021-08-22 LAB
ALBUMIN UR-MCNC: NEGATIVE MG/DL
ANION GAP SERPL CALCULATED.3IONS-SCNC: 4 MMOL/L (ref 3–14)
APPEARANCE UR: CLEAR
BASOPHILS # BLD AUTO: 0 10E3/UL (ref 0–0.2)
BASOPHILS NFR BLD AUTO: 0 %
BILIRUB UR QL STRIP: NEGATIVE
BUN SERPL-MCNC: 17 MG/DL (ref 7–30)
CALCIUM SERPL-MCNC: 8.9 MG/DL (ref 8.5–10.1)
CHLORIDE BLD-SCNC: 112 MMOL/L (ref 94–109)
CO2 SERPL-SCNC: 25 MMOL/L (ref 20–32)
COLOR UR AUTO: YELLOW
CREAT SERPL-MCNC: 0.83 MG/DL (ref 0.66–1.25)
EOSINOPHIL # BLD AUTO: 0 10E3/UL (ref 0–0.7)
EOSINOPHIL NFR BLD AUTO: 1 %
ERYTHROCYTE [DISTWIDTH] IN BLOOD BY AUTOMATED COUNT: 12.5 % (ref 10–15)
GFR SERPL CREATININE-BSD FRML MDRD: 81 ML/MIN/1.73M2
GLUCOSE BLD-MCNC: 126 MG/DL (ref 70–99)
GLUCOSE UR STRIP-MCNC: NEGATIVE MG/DL
HCT VFR BLD AUTO: 44.6 % (ref 40–53)
HGB BLD-MCNC: 15.4 G/DL (ref 13.3–17.7)
HGB UR QL STRIP: NEGATIVE
HOLD SPECIMEN: NORMAL
IMM GRANULOCYTES # BLD: 0 10E3/UL
IMM GRANULOCYTES NFR BLD: 0 %
KETONES UR STRIP-MCNC: NEGATIVE MG/DL
LEUKOCYTE ESTERASE UR QL STRIP: NEGATIVE
LYMPHOCYTES # BLD AUTO: 1.4 10E3/UL (ref 0.8–5.3)
LYMPHOCYTES NFR BLD AUTO: 18 %
MCH RBC QN AUTO: 31.2 PG (ref 26.5–33)
MCHC RBC AUTO-ENTMCNC: 34.5 G/DL (ref 31.5–36.5)
MCV RBC AUTO: 90 FL (ref 78–100)
MONOCYTES # BLD AUTO: 0.6 10E3/UL (ref 0–1.3)
MONOCYTES NFR BLD AUTO: 8 %
NEUTROPHILS # BLD AUTO: 5.6 10E3/UL (ref 1.6–8.3)
NEUTROPHILS NFR BLD AUTO: 73 %
NITRATE UR QL: NEGATIVE
NRBC # BLD AUTO: 0 10E3/UL
NRBC BLD AUTO-RTO: 0 /100
PH UR STRIP: 6 [PH] (ref 5–7)
PLATELET # BLD AUTO: 158 10E3/UL (ref 150–450)
POTASSIUM BLD-SCNC: 4 MMOL/L (ref 3.4–5.3)
PSA SERPL-MCNC: 1.9 UG/L (ref 0–4)
RBC # BLD AUTO: 4.94 10E6/UL (ref 4.4–5.9)
RBC URINE: 1 /HPF
SODIUM SERPL-SCNC: 141 MMOL/L (ref 133–144)
SP GR UR STRIP: 1.01 (ref 1–1.03)
UROBILINOGEN UR STRIP-MCNC: NORMAL MG/DL
WBC # BLD AUTO: 7.7 10E3/UL (ref 4–11)
WBC URINE: <1 /HPF

## 2021-08-22 PROCEDURE — 250N000009 HC RX 250: Performed by: EMERGENCY MEDICINE

## 2021-08-22 PROCEDURE — 81001 URINALYSIS AUTO W/SCOPE: CPT | Performed by: EMERGENCY MEDICINE

## 2021-08-22 PROCEDURE — G0103 PSA SCREENING: HCPCS | Performed by: EMERGENCY MEDICINE

## 2021-08-22 PROCEDURE — 99284 EMERGENCY DEPT VISIT MOD MDM: CPT | Mod: 25

## 2021-08-22 PROCEDURE — 36415 COLL VENOUS BLD VENIPUNCTURE: CPT | Performed by: EMERGENCY MEDICINE

## 2021-08-22 PROCEDURE — 99282 EMERGENCY DEPT VISIT SF MDM: CPT | Performed by: EMERGENCY MEDICINE

## 2021-08-22 PROCEDURE — 51798 US URINE CAPACITY MEASURE: CPT

## 2021-08-22 PROCEDURE — 85041 AUTOMATED RBC COUNT: CPT | Performed by: EMERGENCY MEDICINE

## 2021-08-22 PROCEDURE — 80048 BASIC METABOLIC PNL TOTAL CA: CPT | Performed by: EMERGENCY MEDICINE

## 2021-08-22 RX ORDER — LIDOCAINE HYDROCHLORIDE 20 MG/ML
JELLY TOPICAL ONCE
Status: COMPLETED | OUTPATIENT
Start: 2021-08-22 | End: 2021-08-22

## 2021-08-22 RX ADMIN — LIDOCAINE HYDROCHLORIDE: 20 JELLY TOPICAL at 10:27

## 2021-08-22 NOTE — ED PROVIDER NOTES
History     Chief Complaint   Patient presents with     Urinary Retention     HPI  Juaquin Shea is a 81 year old male who presents with possible urinary retention.  Patient states that last night he had difficulty emptying his bladder and had to small amounts of urine.  He has no problems with stooling.  Denies any abdominal pain, nausea or vomiting.  He did have some lower abdominal discomfort earlier but that seems to be waxing and waning.  No fever or chills.  No flank pain or history of renal stones.  Did have a problem with urinary retention after surgery.  Does have prostatic hypertrophy.  A PSA 4 years ago was normal.  No exposure to infectious illness.  He is on Flomax for the prostatic hypertrophy.    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  Most recent 2011       Dupuytren's contracture of right hand 02/10/2021     Priority: Medium     Poor dentition 05/21/2020     Priority: Medium     Abnormal swallowing 05/21/2020     Priority: Medium     Lymphadenopathy - right sided mainly 05/21/2020     Priority: Medium     Obesity (BMI 35.0-39.9) with comorbidity (H) 10/08/2018     Priority: Medium     S/P total knee arthroplasty 09/30/2013     Priority: Medium     left       DVT prophylaxis 09/30/2013     Priority: Medium     Advanced directives, counseling/discussion 09/16/2011     Priority: Medium     He does have an Advanced care plan and will bring it in.  MP/MA       HYPERLIPIDEMIA LDL GOAL <130 10/31/2010     Priority: Medium     Microalbuminuria 05/18/2010     Priority: Medium     History of colonic polyps 05/17/2010     Priority: Medium     Adenoma 2007  Problem list name updated by automated process. Provider to review       Family history of prostate cancer 05/17/2010     Priority: Medium      Essential hypertension with goal blood pressure less than 140/90 05/17/2010     Priority: Medium     Benign prostatic hyperplasia with urinary retention 05/17/2010     Priority: Medium     Tinnitus 05/17/2010     Priority: Medium     LVH (left ventricular hypertrophy) 05/17/2010     Priority: Medium     suspected, by voltage criteria on ECG       Obesity      Priority: Medium     Hyperlipidemia      Priority: Medium     Problem list name updated by automated process. 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     Priority: Medium        Past Medical History:    Past Medical History:   Diagnosis Date     Benign neoplasm of colon 2007     Degeneration of cervical intervertebral disc 2002     Degeneration of lumbar or lumbosacral intervertebral disc 2007     Erectile dysfunction      Hypertrophy of breast 2004     Impaired fasting glucose 2006     LVH (left ventricular hypertrophy) 5/17/10     Microalbuminuria 5/18/2010     Nonspecific abnormal results of liver function study 2006     Obesity      Other and unspecified hyperlipidemia 2006     Spinal stenosis, lumbar region, without neurogenic claudication 2007     Tinnitus 5/17/2010       Past Surgical History:    Past Surgical History:   Procedure Laterality Date     ARTHROPLASTY KNEE  2/20/2012    hemiarthroplasty right knee     ARTHROPLASTY KNEE  9/30/2013    Procedure: ARTHROPLASTY KNEE;  Left Total Knee Arthroplasty;  Surgeon: Jose D Siegel MD;  Location: [facility] OR     COLONOSCOPY  03/27/07, 06/08/10     COLONOSCOPY N/A 6/16/2015    Procedure: COLONOSCOPY;  Surgeon: Yg Rendon MD;  Location:  GI     EXCISE MASS HAND  3/29/2013    Procedure: EXCISE MASS HAND;  excision right hand mass;  Surgeon: Rafa Moore MD;  Location:  OR       REPAIR ING HERNIA,5+Y/O,REDUCIBL      X2     HC REVISE MEDIAN N/CARPAL TUNNEL SURG  2000    left wrist     HC REVISE MEDIAN N/CARPAL TUNNEL SURG  1994    right wrist     RELEASE CARPAL TUNNEL  2011    Procedure:RELEASE CARPAL TUNNEL; right carpal tunnel release, right ring and middle finger A-1 pulley releases    ; Surgeon:BARRETT CURRY; Location:[facility] OR     RELEASE TRIGGER FINGER  2011    Procedure:RELEASE TRIGGER FINGER; Surgeon:BARRETT CURRY; Location:[facility] OR     ZZHC COLONOSCOPY W/WO BRUSH/WASH       ZZHC COLONOSCOPY W/WO BRUSH/WASH         Family History:    Family History   Problem Relation Age of Onset     Heart Disease Mother         doctored for heart problems at young age     Prostate Cancer Father         age 70s     Stomach Cancer Brother         Cancer free now for over 10 years (T: 8/10/17)     Prostate Cancer Brother      Other - See Comments Brother         [place] virus fighting for about 6 weeks 10/08/18     Respiratory Brother         NE, uses CPAP     Cancer - colorectal No family hx of        Social History:  Marital Status:   [2]  Social History     Tobacco Use     Smoking status: Former Smoker     Packs/day: 0.50     Years: 15.00     Pack years: 7.50     Quit date: 1981     Years since quittin.6     Smokeless tobacco: Former User     Types: Chew     Quit date: 2/10/2017     Tobacco comment: quit in    Substance Use Topics     Alcohol use: Yes     Comment: beer occ     Drug use: No        Medications:    ascorbic acid (VITAMIN C) 1000 MG TABS  aspirin 81 MG tablet  losartan (COZAAR) 25 MG tablet  Multiple Vitamins-Minerals (MULTIVITAMIN ADULT PO)  pravastatin (PRAVACHOL) 40 MG tablet  tamsulosin (FLOMAX) 0.4 MG capsule          Review of Systems all other systems are reviewed and are negative.    Physical Exam   BP: (!) 151/77  Pulse: 64  Temp: 97.9  F (36.6  C)  Resp: 18  Weight: 104.8 kg (231 lb)  SpO2: 95 %      Physical Exam General alert  cooperative male in mild to moderate distress.  HEENT shows no scleral icterus.  Speech is clear.  Neck is supple.  Lungs are clear without adventitious sounds.  No CVA tenderness.  Abdomen reveals obesity with mild suprapubic tenderness.  Bladder seems distended.  No other organomegaly or masses.  No guarding or rebound.    ED Course        Procedures              Critical Care time:  none               Results for orders placed or performed during the hospital encounter of 08/22/21 (from the past 24 hour(s))   CBC with platelets differential    Narrative    The following orders were created for panel order CBC with platelets differential.  Procedure                               Abnormality         Status                     ---------                               -----------         ------                     CBC with platelets and d...[[id number]]                      Final result                 Please view results for these tests on the individual orders.   Basic metabolic panel   Result Value Ref Range    Sodium 141 133 - 144 mmol/L    Potassium 4.0 3.4 - 5.3 mmol/L    Chloride 112 (H) 94 - 109 mmol/L    Carbon Dioxide (CO2) 25 20 - 32 mmol/L    Anion Gap 4 3 - 14 mmol/L    Urea Nitrogen 17 7 - 30 mg/dL    Creatinine 0.83 0.66 - 1.25 mg/dL    Calcium 8.9 8.5 - 10.1 mg/dL    Glucose 126 (H) 70 - 99 mg/dL    GFR Estimate 81 >60 mL/min/1.73m2   Prostate spec antigen screen   Result Value Ref Range    Prostate Specific Antigen Screen 1.90 0.00 - 4.00 ug/L   CBC with platelets and differential   Result Value Ref Range    WBC Count 7.7 4.0 - 11.0 10e3/uL    RBC Count 4.94 4.40 - 5.90 10e6/uL    Hemoglobin 15.4 13.3 - 17.7 g/dL    Hematocrit 44.6 40.0 - 53.0 %    MCV 90 78 - 100 fL    MCH 31.2 26.5 - 33.0 pg    MCHC 34.5 31.5 - 36.5 g/dL    RDW 12.5 10.0 - 15.0 %    Platelet Count 158 150 - 450 10e3/uL    % Neutrophils 73 %    % Lymphocytes 18 %    % Monocytes 8 %    % Eosinophils 1 %    % Basophils 0 %    % Immature  Granulocytes 0 %    NRBCs per 100 WBC 0 <1 /100    Absolute Neutrophils 5.6 1.6 - 8.3 10e3/uL    Absolute Lymphocytes 1.4 0.8 - 5.3 10e3/uL    Absolute Monocytes 0.6 0.0 - 1.3 10e3/uL    Absolute Eosinophils 0.0 0.0 - 0.7 10e3/uL    Absolute Basophils 0.0 0.0 - 0.2 10e3/uL    Absolute Immature Granulocytes 0.0 <=0.0 10e3/uL    Absolute NRBCs 0.0 10e3/uL   Extra Tube    Narrative    The following orders were created for panel order Extra Tube.  Procedure                               Abnormality         Status                     ---------                               -----------         ------                     Extra Red Top Tube[[id number]]                               Final result                 Please view results for these tests on the individual orders.   Extra Red Top Tube   Result Value Ref Range    Hold Specimen JI    UA with Microscopic reflex to Culture    Specimen: Urine, Copeland Catheter   Result Value Ref Range    Color Urine Yellow Colorless, Straw, Light Yellow, Yellow    Appearance Urine Clear Clear    Glucose Urine Negative Negative mg/dL    Bilirubin Urine Negative Negative    Ketones Urine Negative Negative mg/dL    Specific Gravity Urine 1.012 1.003 - 1.035    Blood Urine Negative Negative    pH Urine 6.0 5.0 - 7.0    Protein Albumin Urine Negative Negative mg/dL    Urobilinogen Urine Normal Normal, 2.0 mg/dL    Nitrite Urine Negative Negative    Leukocyte Esterase Urine Negative Negative    RBC Urine 1 <=2 /HPF    WBC Urine <1 <=5 /HPF    Narrative    Urine Culture not indicated       Medications   lidocaine (XYLOCAINE) 2 % external gel ( Topical Given 8/22/21 1027)     A bladder scan was obtained and had 850 cc present.  A Copeland catheter was placed on difficulty.  Patient had relief at that time.  His blood work was unremarkable including renal function.  PSA was still within normal range at 1.9.  Urine did not show signs of infection or red cells/blood that would suggest renal  stone.  Assessments & Plan (with Medical Decision Making)   Juaquin Shea is a 81 year old male who presents with possible urinary retention.  Patient states that last night he had difficulty emptying his bladder and had to small amounts of urine.  He has no problems with stooling.  Denies any abdominal pain, nausea or vomiting.  He did have some lower abdominal discomfort earlier but that seems to be waxing and waning.  No fever or chills.  No flank pain or history of renal stones.  Did have a problem with urinary retention after surgery.  Does have prostatic hypertrophy.  A PSA 4 years ago was normal.  No exposure to infectious illness.  He is on Flomax for the prostatic hypertrophy.  On presentation patient was afebrile and vitally stable.  He had no CVA tenderness.  Did have suprapubic tenderness and a distended bladder but otherwise benign abdomen.  Blood work was normal as was his urine.  He did have retention that was resolved with a catheter.  History of prostatic hypertrophy.  On Flomax which he will continue.  No signs of infection.  No red cells that would suggest kidney stone.  Prefers to have the catheter removed today.  Handout on urinary retention is provided reasons to return for reassessment discussed  I have reviewed the nursing notes.    I have reviewed the findings, diagnosis, plan and need for follow up with the patient.       New Prescriptions    No medications on file       Final diagnoses:   Urinary retention       8/22/2021   [facility] EMERGENCY DEPT     Finn Michael MD  08/22/21 [id number]

## 2021-08-22 NOTE — ED TRIAGE NOTES
Patient states he had urinary retention once before post-surgical.  Last night noted difficulty emptying bladder - would only urinate about 1 ounce at a time.

## 2021-08-22 NOTE — DISCHARGE INSTRUCTIONS
If after 6 or 8 hours or unable to void and are getting uncomfortable you need to return for a catheter placement.  You have any new concerning symptoms such as vomiting, fever, or abdominal pain you can return to the ER also.  You should probably contact your doctor in the next week or so just let them know that you had this problem and he may want to increase your Flomax or consider other medicines for your prostatic hypertrophy.

## 2022-02-15 DIAGNOSIS — R33.8 BENIGN PROSTATIC HYPERPLASIA WITH URINARY RETENTION: ICD-10-CM

## 2022-02-15 DIAGNOSIS — N40.1 BENIGN PROSTATIC HYPERPLASIA WITH URINARY RETENTION: ICD-10-CM

## 2022-02-15 DIAGNOSIS — E78.5 HYPERLIPIDEMIA LDL GOAL <130: ICD-10-CM

## 2022-02-15 RX ORDER — PRAVASTATIN SODIUM 40 MG
TABLET ORAL
Qty: 90 TABLET | Refills: 0 | Status: SHIPPED | OUTPATIENT
Start: 2022-02-15 | End: 2022-04-13

## 2022-02-15 RX ORDER — TAMSULOSIN HYDROCHLORIDE 0.4 MG/1
CAPSULE ORAL
Qty: 180 CAPSULE | Refills: 0 | Status: SHIPPED | OUTPATIENT
Start: 2022-02-15 | End: 2022-04-13

## 2022-02-15 NOTE — TELEPHONE ENCOUNTER
Pending Prescriptions:                       Disp   Refills    tamsulosin (FLOMAX) 0.4 MG capsule [Pharm*180 ca*0            Sig: TAKE 2 CAPSULES DAILY    pravastatin (PRAVACHOL) 40 MG tablet [Pha*90 tab*0            Sig: TAKE 1 TABLET DAILY    Medication is being filled for 1 time nilo refill only due to:  Patient is due for medication check/BP check with pcp.     Please call and help schedule.  Thank you!    DON HarperN, RN, PHN  [place]  February 15, 2022

## 2022-03-24 DIAGNOSIS — I10 ESSENTIAL HYPERTENSION WITH GOAL BLOOD PRESSURE LESS THAN 140/90: ICD-10-CM

## 2022-03-25 RX ORDER — LOSARTAN POTASSIUM 25 MG/1
TABLET ORAL
Qty: 90 TABLET | Refills: 0 | Status: SHIPPED | OUTPATIENT
Start: 2022-03-25 | End: 2022-04-13

## 2022-03-25 NOTE — TELEPHONE ENCOUNTER
Pending Prescriptions:                       Disp   Refills    losartan (COZAAR) 25 MG tablet [Pharmacy M*90 tab*3        Sig: TAKE 1 TABLET DAILY    Routing refill request to provider for review/approval because:  Labs out of range:  BP  BP Readings from Last 3 Encounters:   08/22/21 (!) 151/77   02/18/21 (!) 153/72   02/10/21 118/82

## 2022-03-25 NOTE — TELEPHONE ENCOUNTER
Your medication has been refilled.  Please schedule a pharmacy or nurse visit BP check to follow up on how this medication is working for you before your next refill.  We need to be sure everything is working well and stable prior to further refills.

## 2022-04-13 ENCOUNTER — OFFICE VISIT (OUTPATIENT)
Dept: FAMILY MEDICINE | Facility: OTHER | Age: 82
End: 2022-04-13
Payer: COMMERCIAL

## 2022-04-13 VITALS
HEIGHT: 67 IN | BODY MASS INDEX: 35.63 KG/M2 | DIASTOLIC BLOOD PRESSURE: 70 MMHG | RESPIRATION RATE: 24 BRPM | TEMPERATURE: 97.4 F | OXYGEN SATURATION: 97 % | WEIGHT: 227 LBS | HEART RATE: 56 BPM | SYSTOLIC BLOOD PRESSURE: 122 MMHG

## 2022-04-13 DIAGNOSIS — E66.01 MORBID OBESITY (H): ICD-10-CM

## 2022-04-13 DIAGNOSIS — G56.13 MEDIAN NEUROPATHY OF BOTH UPPER EXTREMITIES: Primary | ICD-10-CM

## 2022-04-13 DIAGNOSIS — R80.9 MICROALBUMINURIA: ICD-10-CM

## 2022-04-13 DIAGNOSIS — R33.8 BENIGN PROSTATIC HYPERPLASIA WITH URINARY RETENTION: ICD-10-CM

## 2022-04-13 DIAGNOSIS — N53.19 ANEJACULATION: ICD-10-CM

## 2022-04-13 DIAGNOSIS — N40.1 BENIGN PROSTATIC HYPERPLASIA WITH URINARY RETENTION: ICD-10-CM

## 2022-04-13 DIAGNOSIS — E78.5 HYPERLIPIDEMIA LDL GOAL <130: ICD-10-CM

## 2022-04-13 DIAGNOSIS — I10 ESSENTIAL HYPERTENSION WITH GOAL BLOOD PRESSURE LESS THAN 140/90: ICD-10-CM

## 2022-04-13 DIAGNOSIS — Z23 HIGH PRIORITY FOR 2019-NCOV VACCINE: ICD-10-CM

## 2022-04-13 LAB
ALBUMIN SERPL-MCNC: 4.1 G/DL (ref 3.4–5)
ALP SERPL-CCNC: 58 U/L (ref 40–150)
ALT SERPL W P-5'-P-CCNC: 35 U/L (ref 0–70)
ANION GAP SERPL CALCULATED.3IONS-SCNC: 3 MMOL/L (ref 3–14)
AST SERPL W P-5'-P-CCNC: 23 U/L (ref 0–45)
BILIRUB SERPL-MCNC: 0.6 MG/DL (ref 0.2–1.3)
BUN SERPL-MCNC: 22 MG/DL (ref 7–30)
CALCIUM SERPL-MCNC: 9.3 MG/DL (ref 8.5–10.1)
CHLORIDE BLD-SCNC: 109 MMOL/L (ref 94–109)
CHOLEST SERPL-MCNC: 139 MG/DL
CO2 SERPL-SCNC: 27 MMOL/L (ref 20–32)
CREAT SERPL-MCNC: 0.77 MG/DL (ref 0.66–1.25)
FASTING STATUS PATIENT QL REPORTED: YES
GFR SERPL CREATININE-BSD FRML MDRD: 89 ML/MIN/1.73M2
GLUCOSE BLD-MCNC: 117 MG/DL (ref 70–99)
HDLC SERPL-MCNC: 38 MG/DL
LDLC SERPL CALC-MCNC: 76 MG/DL
NONHDLC SERPL-MCNC: 101 MG/DL
POTASSIUM BLD-SCNC: 4.3 MMOL/L (ref 3.4–5.3)
PROT SERPL-MCNC: 7.3 G/DL (ref 6.8–8.8)
SODIUM SERPL-SCNC: 139 MMOL/L (ref 133–144)
TRIGL SERPL-MCNC: 125 MG/DL

## 2022-04-13 PROCEDURE — 91306 COVID-19,PF,MODERNA (18+ YRS BOOSTER .25ML): CPT | Performed by: FAMILY MEDICINE

## 2022-04-13 PROCEDURE — 36415 COLL VENOUS BLD VENIPUNCTURE: CPT | Performed by: FAMILY MEDICINE

## 2022-04-13 PROCEDURE — 0064A COVID-19,PF,MODERNA (18+ YRS BOOSTER .25ML): CPT | Performed by: FAMILY MEDICINE

## 2022-04-13 PROCEDURE — 99214 OFFICE O/P EST MOD 30 MIN: CPT | Performed by: FAMILY MEDICINE

## 2022-04-13 PROCEDURE — 80053 COMPREHEN METABOLIC PANEL: CPT | Performed by: FAMILY MEDICINE

## 2022-04-13 PROCEDURE — 80061 LIPID PANEL: CPT | Performed by: FAMILY MEDICINE

## 2022-04-13 RX ORDER — TAMSULOSIN HYDROCHLORIDE 0.4 MG/1
CAPSULE ORAL
Qty: 180 CAPSULE | Refills: 3 | Status: SHIPPED | OUTPATIENT
Start: 2022-04-13 | End: 2023-05-13

## 2022-04-13 RX ORDER — LOSARTAN POTASSIUM 25 MG/1
TABLET ORAL
Qty: 90 TABLET | Refills: 3 | Status: SHIPPED | OUTPATIENT
Start: 2022-04-13 | End: 2023-05-13

## 2022-04-13 RX ORDER — PRAVASTATIN SODIUM 40 MG
TABLET ORAL
Qty: 90 TABLET | Refills: 3 | Status: SHIPPED | OUTPATIENT
Start: 2022-04-13 | End: 2023-05-13

## 2022-04-13 NOTE — PROGRESS NOTES
Assessment & Plan       ICD-10-CM    1. Median neuropathy of both upper extremities  G56.13 Orthopedic  Referral   2. Essential hypertension with goal blood pressure less than 140/90  I10 losartan (COZAAR) 25 MG tablet     Comprehensive metabolic panel (BMP + Alb, Alk Phos, ALT, AST, Total. Bili, TP)     Comprehensive metabolic panel (BMP + Alb, Alk Phos, ALT, AST, Total. Bili, TP)     Albumin Random Urine Quantitative with Creat Ratio   3. Obesity (BMI 35.0-39.9) with comorbidity (H)  E66.01 Comprehensive metabolic panel (BMP + Alb, Alk Phos, ALT, AST, Total. Bili, TP)     Comprehensive metabolic panel (BMP + Alb, Alk Phos, ALT, AST, Total. Bili, TP)   4. Anejaculation  N53.19    5. Benign prostatic hyperplasia with urinary retention  N40.1 tamsulosin (FLOMAX) 0.4 MG capsule    R33.8    6. Hyperlipidemia LDL goal <130  E78.5 Comprehensive metabolic panel (BMP + Alb, Alk Phos, ALT, AST, Total. Bili, TP)     Lipid panel reflex to direct LDL Fasting     pravastatin (PRAVACHOL) 40 MG tablet     Comprehensive metabolic panel (BMP + Alb, Alk Phos, ALT, AST, Total. Bili, TP)     Lipid panel reflex to direct LDL Fasting   7. Microalbuminuria  R80.9 Albumin Random Urine Quantitative with Creat Ratio   8. High priority for 2019-nCoV vaccine  Z23 COVID-19,PF,MODERNA (18+ Yrs BOOSTER .25mL)      1.  Reviewed findings on his EMG as well as recommendations from his orthopedist.  Discussed possible use of cock-up wrist splints versus other approaches.  Patient is reluctantly willing to try the wrist months.  He also requests referral to the previous orthopedic group that he saw in hopes that they would be willing to do surgery if the splints are ineffective.  This was placed.  2.  Currently controlled.  Will continue current regimen and check monitoring labs.  3.  Encouraged lifestyle modifications.  Will follow and assist with this as able.  4.  Unclear etiology, but this certainly could be related to his Flomax.   "Patient will try reducing Flomax and if not improving, he will consider referral to urology.  He did not wish to proceed with that today.  5.  Fair control.  Unclear how much benefit he is getting from the Flomax.  If his symptoms worsen after reducing his Flomax dose, will need to resume previous dosing and informed patient to see urology.  6.  Currently controlled.  Will continue current regimen and check monitoring labs.  7.  We will repeat labs to follow this.  8.  Immunized.    Portions of this note were completed using Dragon dictation software.  Although reviewed, there may be typographical and other inadvertent errors that remain.         Ordering of each unique test  Prescription drug management         BMI:   Estimated body mass index is 35.21 kg/m  as calculated from the following:    Height as of this encounter: 1.71 m (5' 7.32\").    Weight as of this encounter: 103 kg (227 lb).   Weight management plan: Discussed healthy diet and exercise guidelines    Patient Instructions   Thank you for visiting [facility]    Try the wrist splints to help with your carpal tunnel symptoms.      If not improving, OK to see the other orthopedist that we discussed.    Can try reducing tamsulosin to one tablet daily.  If not helping with your sexual side effects, could try going off as long as you're not having worse trouble with urination.  We can also get you in with urology to further evaluate.      We'll let you know your lab results as soon as we can.     Keep working on weight loss and staying active.      Contact us or return if questions or concerns.     Have a nice day!    Dr. Howard     Return in about 3 months (around 7/13/2022) for Well check.      If you need medication refills, please contact your pharmacy 3 days before your prescriptions runs out or download the [facility] kell for your smart phone. If you are out of refills, your pharmacy will contact contact the clinic.               " "                      Patrick Quintanilla [phone]                    Return in about 3 months (around 7/13/2022) for Well check.    Timo Howard MD, MD  [facility] NAVEEN Reza is a 81 year old who presents for the following health issues     HPI     Hypertension Follow-up      Do you check your blood pressure regularly outside of the clinic? Yes     Are you following a low salt diet? Yes    Are your blood pressures ever more than 140 on the top number (systolic) OR more   than 90 on the bottom number (diastolic), for example 140/90? No    Pt continues to have tingling and numbness of his hands.  He saw neurology, found to have moderately severe bilateral Median neuropathy.  Right is slightly worse than left.  The numbness if quite \"irritating\".  Not a lot of pain.  Also has moderate left ulnar neuropathy.      He hasn't tried wrist splints recently.      He was recommended to see neurology again to determine if surgery might be helpful.    Pt continues on Flomax for his BPH.  Not as well controlled as he'd like, but not getting worse.      Doing well with pravastatin and ASA.      He notes difficulty with ejaculation/climax.  He uses a vacuum pump to get an erection, still has libido.        How many servings of fruits and vegetables do you eat daily?  2-3    On average, how many sweetened beverages do you drink each day (Examples: soda, juice, sweet tea, etc.  Do NOT count diet or artificially sweetened beverages)?   1    How many days per week do you exercise enough to make your heart beat faster? 3 or less    How many minutes a day do you exercise enough to make your heart beat faster? 9 or less    How many days per week do you miss taking your medication? 0        Review of Systems   Constitutional, HEENT, cardiovascular, pulmonary, gi and gu systems are negative, except as otherwise noted.      Objective    /70   Pulse 56   Temp 97.4  F (36.3  C) " "(Temporal)   Resp 24   Ht 1.71 m (5' 7.32\")   Wt 103 kg (227 lb)   SpO2 97%   BMI 35.21 kg/m    Body mass index is 35.21 kg/m .  Physical Exam   GENERAL: healthy, alert and no distress  NECK: no adenopathy, no asymmetry, masses, or scars and thyroid normal to palpation  RESP: lungs clear to auscultation - no rales, rhonchi or wheezes  CV: regular rate and rhythm, normal S1 S2, no S3 or S4, no murmur, click or rub, no peripheral edema and peripheral pulses strong  ABDOMEN: soft, nontender, no hepatosplenomegaly, no masses and bowel sounds normal  MS: no gross musculoskeletal defects noted, no edema  MS: mildly positive Tinel's.  Phalen's difficult over median nerve due to wrist decreased ROM    Admission on 08/22/2021, Discharged on 08/22/2021   Component Date Value Ref Range Status     Sodium 08/22/2021 141  133 - 144 mmol/L Final     Potassium 08/22/2021 4.0  3.4 - 5.3 mmol/L Final     Chloride 08/22/2021 112 (A) 94 - 109 mmol/L Final     Carbon Dioxide (CO2) 08/22/2021 25  20 - 32 mmol/L Final     Anion Gap 08/22/2021 4  3 - 14 mmol/L Final     Urea Nitrogen 08/22/2021 17  7 - 30 mg/dL Final     Creatinine 08/22/2021 0.83  0.66 - 1.25 mg/dL Final     Calcium 08/22/2021 8.9  8.5 - 10.1 mg/dL Final     Glucose 08/22/2021 126 (A) 70 - 99 mg/dL Final     GFR Estimate 08/22/2021 81  >60 mL/min/1.73m2 Final    As of July 11, 2021, eGFR is calculated by the CKD-EPI creatinine equation, without race adjustment. eGFR can be influenced by muscle mass, exercise, and diet. The reported eGFR is an estimation only and is only applicable if the renal function is stable.     Color Urine 08/22/2021 Yellow  Colorless, Straw, Light Yellow, Yellow Final     Appearance Urine 08/22/2021 Clear  Clear Final     Glucose Urine 08/22/2021 Negative  Negative mg/dL Final     Bilirubin Urine 08/22/2021 Negative  Negative Final     Ketones Urine 08/22/2021 Negative  Negative mg/dL Final     Specific Gravity Urine 08/22/2021 1.012  1.003 - " 1.035 Final     Blood Urine 08/22/2021 Negative  Negative Final     pH Urine 08/22/2021 6.0  5.0 - 7.0 Final     Protein Albumin Urine 08/22/2021 Negative  Negative mg/dL Final     Urobilinogen Urine 08/22/2021 Normal  Normal, 2.0 mg/dL Final     Nitrite Urine 08/22/2021 Negative  Negative Final     Leukocyte Esterase Urine 08/22/2021 Negative  Negative Final     RBC Urine 08/22/2021 1  <=2 /HPF Final     WBC Urine 08/22/2021 <1  <=5 /HPF Final     Prostate Specific Antigen Screen 08/22/2021 1.90  0.00 - 4.00 ug/L Final     WBC Count 08/22/2021 7.7  4.0 - 11.0 10e3/uL Final     RBC Count 08/22/2021 4.94  4.40 - 5.90 10e6/uL Final     Hemoglobin 08/22/2021 15.4  13.3 - 17.7 g/dL Final     Hematocrit 08/22/2021 44.6  40.0 - 53.0 % Final     MCV 08/22/2021 90  78 - 100 fL Final     MCH 08/22/2021 31.2  26.5 - 33.0 pg Final     MCHC 08/22/2021 34.5  31.5 - 36.5 g/dL Final     RDW 08/22/2021 12.5  10.0 - 15.0 % Final     Platelet Count 08/22/2021 158  150 - 450 10e3/uL Final     % Neutrophils 08/22/2021 73  % Final     % Lymphocytes 08/22/2021 18  % Final     % Monocytes 08/22/2021 8  % Final     % Eosinophils 08/22/2021 1  % Final     % Basophils 08/22/2021 0  % Final     % Immature Granulocytes 08/22/2021 0  % Final     NRBCs per 100 WBC 08/22/2021 0  <1 /100 Final     Absolute Neutrophils 08/22/2021 5.6  1.6 - 8.3 10e3/uL Final     Absolute Lymphocytes 08/22/2021 1.4  0.8 - 5.3 10e3/uL Final     Absolute Monocytes 08/22/2021 0.6  0.0 - 1.3 10e3/uL Final     Absolute Eosinophils 08/22/2021 0.0  0.0 - 0.7 10e3/uL Final     Absolute Basophils 08/22/2021 0.0  0.0 - 0.2 10e3/uL Final     Absolute Immature Granulocytes 08/22/2021 0.0  <=0.0 10e3/uL Final     Absolute NRBCs 08/22/2021 0.0  10e3/uL Final     Hold Specimen 08/22/2021 [facility]   Final     No results found for any visits on 04/13/22.  No results found for this or any previous visit (from the past 24 hour(s)).

## 2022-04-13 NOTE — RESULT ENCOUNTER NOTE
All of your labs were normal for you except your blood sugar.  Try to work on weight loss, reduce carbohydrates in your diet, and stay active.  We should recheck this at least annually.    Have a nice day!    Dr. Howard

## 2022-04-13 NOTE — PATIENT INSTRUCTIONS
Thank you for visiting [facility]    Try the wrist splints to help with your carpal tunnel symptoms.      If not improving, OK to see the other orthopedist that we discussed.    Can try reducing tamsulosin to one tablet daily.  If not helping with your sexual side effects, could try going off as long as you're not having worse trouble with urination.  We can also get you in with urology to further evaluate.      We'll let you know your lab results as soon as we can.     Keep working on weight loss and staying active.      Contact us or return if questions or concerns.     Have a nice day!    Dr. Howard     Return in about 3 months (around 7/13/2022) for Well check.      If you need medication refills, please contact your pharmacy 3 days before your prescriptions runs out or download the [facility] kell for your smart phone. If you are out of refills, your pharmacy will contact contact the clinic.                                     [facility] [phone]

## 2022-04-13 NOTE — LETTER
April 13, 2022      Juaquin Shea  [address]   [place] [id number]        Dear ,    We are writing to inform you of your test results.    All of your labs were normal for you except your blood sugar.  Try to work on weight loss, reduce carbohydrates in your diet, and stay active.  We should recheck this at least annually.     Have a nice day!         Resulted Orders   Comprehensive metabolic panel (BMP + Alb, Alk Phos, ALT, AST, Total. Bili, TP)   Result Value Ref Range    Sodium 139 133 - 144 mmol/L    Potassium 4.3 3.4 - 5.3 mmol/L    Chloride 109 94 - 109 mmol/L    Carbon Dioxide (CO2) 27 20 - 32 mmol/L    Anion Gap 3 3 - 14 mmol/L    Urea Nitrogen 22 7 - 30 mg/dL    Creatinine 0.77 0.66 - 1.25 mg/dL    Calcium 9.3 8.5 - 10.1 mg/dL    Glucose 117 (H) 70 - 99 mg/dL    Alkaline Phosphatase 58 40 - 150 U/L    AST 23 0 - 45 U/L    ALT 35 0 - 70 U/L    Protein Total 7.3 6.8 - 8.8 g/dL    Albumin 4.1 3.4 - 5.0 g/dL    Bilirubin Total 0.6 0.2 - 1.3 mg/dL    GFR Estimate 89 >60 mL/min/1.73m2      Comment:      Effective December 21, 2021 eGFRcr in adults is calculated using the 2021 CKD-EPI creatinine equation which includes age and gender (Moose angel al., [place], DOI: 10.1056/WGAPbu4022562)   Lipid panel reflex to direct LDL Fasting   Result Value Ref Range    Cholesterol 139 <200 mg/dL    Triglycerides 125 <150 mg/dL    Direct Measure HDL 38 (L) >=40 mg/dL    LDL Cholesterol Calculated 76 <=100 mg/dL    Non HDL Cholesterol 101 <130 mg/dL    Patient Fasting > 8hrs? Yes     Narrative    Cholesterol  Desirable:  <200 mg/dL    Triglycerides  Normal:  Less than 150 mg/dL  Borderline High:  150-199 mg/dL  High:  200-499 mg/dL  Very High:  Greater than or equal to 500 mg/dL    Direct Measure HDL  Female:  Greater than or equal to 50 mg/dL   Male:  Greater than or equal to 40 mg/dL    LDL Cholesterol  Desirable:  <100mg/dL  Above Desirable:  100-129 mg/dL   Borderline High:  130-159 mg/dL   High:  160-189 mg/dL    Very High:  >= 190 mg/dL    Non HDL Cholesterol  Desirable:  130 mg/dL  Above Desirable:  130-159 mg/dL  Borderline High:  160-189 mg/dL  High:  190-219 mg/dL  Very High:  Greater than or equal to 220 mg/dL       If you have any questions or concerns, please call the clinic at the number listed above.       Sincerely,      Timo Howard MD

## 2022-04-18 ENCOUNTER — TRANSFERRED RECORDS (OUTPATIENT)
Dept: HEALTH INFORMATION MANAGEMENT | Facility: CLINIC | Age: 82
End: 2022-04-18
Payer: COMMERCIAL

## 2022-05-18 ENCOUNTER — TRANSFERRED RECORDS (OUTPATIENT)
Dept: HEALTH INFORMATION MANAGEMENT | Facility: CLINIC | Age: 82
End: 2022-05-18
Payer: COMMERCIAL

## 2022-06-23 ENCOUNTER — TRANSFERRED RECORDS (OUTPATIENT)
Dept: HEALTH INFORMATION MANAGEMENT | Facility: CLINIC | Age: 82
End: 2022-06-23

## 2022-08-08 ENCOUNTER — TRANSFERRED RECORDS (OUTPATIENT)
Dept: HEALTH INFORMATION MANAGEMENT | Facility: CLINIC | Age: 82
End: 2022-08-08

## 2023-05-10 DIAGNOSIS — N40.1 BENIGN PROSTATIC HYPERPLASIA WITH URINARY RETENTION: ICD-10-CM

## 2023-05-10 DIAGNOSIS — R33.8 BENIGN PROSTATIC HYPERPLASIA WITH URINARY RETENTION: ICD-10-CM

## 2023-05-10 DIAGNOSIS — I10 ESSENTIAL HYPERTENSION WITH GOAL BLOOD PRESSURE LESS THAN 140/90: ICD-10-CM

## 2023-05-10 DIAGNOSIS — E78.5 HYPERLIPIDEMIA LDL GOAL <130: ICD-10-CM

## 2023-05-10 NOTE — TELEPHONE ENCOUNTER
"Pending Prescriptions:                       Disp   Refills    pravastatin (PRAVACHOL) 40 MG tablet [Phar*90 tab*3        Sig: TAKE 1 TABLET DAILY    tamsulosin (FLOMAX) 0.4 MG capsule [Pharma*180 ca*3        Sig: TAKE 2 CAPSULES DAILY    Routing refill request to provider for review/approval because:  Patient needs to be seen because it has been more than 1 year since last office visit.  Requested Prescriptions   Pending Prescriptions Disp Refills    pravastatin (PRAVACHOL) 40 MG tablet [Pharmacy Med Name: PRAVASTATIN TABS 40MG] 90 tablet 3     Sig: TAKE 1 TABLET DAILY       Statins Protocol Failed - 5/10/2023 12:12 AM        Failed - LDL on file in past 12 months     Recent Labs   Lab Test 04/13/22  0946   LDL 76               Failed - Recent (12 mo) or future (30 days) visit within the authorizing provider's specialty     Patient has had an office visit with the authorizing provider or a provider within the authorizing providers department within the previous 12 mos or has a future within next 30 days. See \"Patient Info\" tab in inbasket, or \"Choose Columns\" in Meds & Orders section of the refill encounter.              Passed - No abnormal creatine kinase in past 12 months     No lab results found.             Passed - Medication is active on med list        Passed - Patient is age 18 or older          tamsulosin (FLOMAX) 0.4 MG capsule [Pharmacy Med Name: TAMSULOSIN HCL CAPS 0.4MG] 180 capsule 3     Sig: TAKE 2 CAPSULES DAILY       Alpha Blockers Failed - 5/10/2023 12:12 AM        Failed - Blood pressure under 140/90 in past 12 months     BP Readings from Last 3 Encounters:   04/13/22 122/70   08/22/21 (!) 151/77   02/18/21 (!) 153/72                 Failed - Recent (12 mo) or future (30 days) visit within the authorizing provider's specialty     Patient has had an office visit with the authorizing provider or a provider within the authorizing providers department within the previous 12 mos or has a future " "within next 30 days. See \"Patient Info\" tab in inbasket, or \"Choose Columns\" in Meds & Orders section of the refill encounter.              Passed - Patient does not have Tadalafil, Vardenafil, or Sildenafil on their medication list        Passed - Medication is active on med list        Passed - Patient is 18 years of age or older           Mildred Israel RN on 5/10/2023 at 10:11 AM        "

## 2023-05-11 RX ORDER — PRAVASTATIN SODIUM 40 MG
TABLET ORAL
Qty: 90 TABLET | Refills: 3 | OUTPATIENT
Start: 2023-05-11

## 2023-05-11 RX ORDER — TAMSULOSIN HYDROCHLORIDE 0.4 MG/1
CAPSULE ORAL
Qty: 180 CAPSULE | Refills: 3 | OUTPATIENT
Start: 2023-05-11

## 2023-05-11 NOTE — TELEPHONE ENCOUNTER
Pt is overdue for follow up.  Please schedule follow up visit (OK to use any open spot in the next month or two).  Once scheduled, can refill enough medication to get patient to the visit.

## 2023-05-13 RX ORDER — PRAVASTATIN SODIUM 40 MG
TABLET ORAL
Qty: 90 TABLET | Refills: 0 | Status: SHIPPED | OUTPATIENT
Start: 2023-05-13 | End: 2023-05-16

## 2023-05-13 RX ORDER — TAMSULOSIN HYDROCHLORIDE 0.4 MG/1
CAPSULE ORAL
Qty: 180 CAPSULE | Refills: 0 | Status: SHIPPED | OUTPATIENT
Start: 2023-05-13 | End: 2023-05-16

## 2023-05-13 RX ORDER — LOSARTAN POTASSIUM 25 MG/1
TABLET ORAL
Qty: 90 TABLET | Refills: 0 | Status: SHIPPED | OUTPATIENT
Start: 2023-05-13 | End: 2023-05-16

## 2023-05-13 NOTE — TELEPHONE ENCOUNTER
Prescriptions renewed, but would prefer to see patient before August.  Okay to use any open spot prior to that that will work for his schedule.

## 2023-05-16 RX ORDER — PRAVASTATIN SODIUM 40 MG
TABLET ORAL
Qty: 90 TABLET | Refills: 0 | Status: SHIPPED | OUTPATIENT
Start: 2023-05-16 | End: 2023-06-14

## 2023-05-16 RX ORDER — TAMSULOSIN HYDROCHLORIDE 0.4 MG/1
CAPSULE ORAL
Qty: 180 CAPSULE | Refills: 0 | Status: SHIPPED | OUTPATIENT
Start: 2023-05-16 | End: 2023-06-14

## 2023-05-16 RX ORDER — LOSARTAN POTASSIUM 25 MG/1
TABLET ORAL
Qty: 90 TABLET | Refills: 0 | Status: SHIPPED | OUTPATIENT
Start: 2023-05-16 | End: 2023-06-14

## 2023-05-16 NOTE — TELEPHONE ENCOUNTER
Patient called back and said he needs prescriptions to go to [facility].  Not [facility].

## 2023-06-14 ENCOUNTER — OFFICE VISIT (OUTPATIENT)
Dept: FAMILY MEDICINE | Facility: OTHER | Age: 83
End: 2023-06-14
Payer: COMMERCIAL

## 2023-06-14 VITALS
HEART RATE: 56 BPM | BODY MASS INDEX: 33.98 KG/M2 | RESPIRATION RATE: 22 BRPM | WEIGHT: 216.5 LBS | SYSTOLIC BLOOD PRESSURE: 116 MMHG | TEMPERATURE: 98.4 F | DIASTOLIC BLOOD PRESSURE: 56 MMHG | OXYGEN SATURATION: 95 % | HEIGHT: 67 IN

## 2023-06-14 DIAGNOSIS — R33.8 BENIGN PROSTATIC HYPERPLASIA WITH URINARY RETENTION: ICD-10-CM

## 2023-06-14 DIAGNOSIS — G56.03 BILATERAL CARPAL TUNNEL SYNDROME: ICD-10-CM

## 2023-06-14 DIAGNOSIS — E66.01 MORBID OBESITY (H): ICD-10-CM

## 2023-06-14 DIAGNOSIS — I10 ESSENTIAL HYPERTENSION WITH GOAL BLOOD PRESSURE LESS THAN 140/90: ICD-10-CM

## 2023-06-14 DIAGNOSIS — Z23 NEED FOR VACCINATION: ICD-10-CM

## 2023-06-14 DIAGNOSIS — Z00.00 ENCOUNTER FOR MEDICARE ANNUAL WELLNESS EXAM: Primary | ICD-10-CM

## 2023-06-14 DIAGNOSIS — N40.1 BENIGN PROSTATIC HYPERPLASIA WITH URINARY RETENTION: ICD-10-CM

## 2023-06-14 DIAGNOSIS — R73.9 HYPERGLYCEMIA: ICD-10-CM

## 2023-06-14 DIAGNOSIS — E78.5 HYPERLIPIDEMIA LDL GOAL <130: ICD-10-CM

## 2023-06-14 LAB
ALBUMIN SERPL BCG-MCNC: 4.1 G/DL (ref 3.5–5.2)
ALP SERPL-CCNC: 59 U/L (ref 40–129)
ALT SERPL W P-5'-P-CCNC: 21 U/L (ref 0–70)
ANION GAP SERPL CALCULATED.3IONS-SCNC: 9 MMOL/L (ref 7–15)
AST SERPL W P-5'-P-CCNC: 21 U/L (ref 0–45)
BILIRUB SERPL-MCNC: 0.3 MG/DL
BUN SERPL-MCNC: 20.4 MG/DL (ref 8–23)
CALCIUM SERPL-MCNC: 9.4 MG/DL (ref 8.8–10.2)
CHLORIDE SERPL-SCNC: 106 MMOL/L (ref 98–107)
CHOLEST SERPL-MCNC: 132 MG/DL
CREAT SERPL-MCNC: 0.71 MG/DL (ref 0.67–1.17)
DEPRECATED HCO3 PLAS-SCNC: 26 MMOL/L (ref 22–29)
GFR SERPL CREATININE-BSD FRML MDRD: 90 ML/MIN/1.73M2
GLUCOSE SERPL-MCNC: 124 MG/DL (ref 70–99)
HDLC SERPL-MCNC: 41 MG/DL
LDLC SERPL CALC-MCNC: 76 MG/DL
NONHDLC SERPL-MCNC: 91 MG/DL
POTASSIUM SERPL-SCNC: 4.3 MMOL/L (ref 3.4–5.3)
PROT SERPL-MCNC: 6.8 G/DL (ref 6.4–8.3)
SODIUM SERPL-SCNC: 141 MMOL/L (ref 136–145)
TRIGL SERPL-MCNC: 77 MG/DL

## 2023-06-14 PROCEDURE — 0134A COVID-19 BIVALENT 18+ (MODERNA): CPT | Performed by: FAMILY MEDICINE

## 2023-06-14 PROCEDURE — 80061 LIPID PANEL: CPT | Performed by: FAMILY MEDICINE

## 2023-06-14 PROCEDURE — 80053 COMPREHEN METABOLIC PANEL: CPT | Performed by: FAMILY MEDICINE

## 2023-06-14 PROCEDURE — G0439 PPPS, SUBSEQ VISIT: HCPCS | Performed by: FAMILY MEDICINE

## 2023-06-14 PROCEDURE — 91313 COVID-19 BIVALENT 18+ (MODERNA): CPT | Performed by: FAMILY MEDICINE

## 2023-06-14 PROCEDURE — 36415 COLL VENOUS BLD VENIPUNCTURE: CPT | Performed by: FAMILY MEDICINE

## 2023-06-14 PROCEDURE — 99214 OFFICE O/P EST MOD 30 MIN: CPT | Mod: 25 | Performed by: FAMILY MEDICINE

## 2023-06-14 RX ORDER — TAMSULOSIN HYDROCHLORIDE 0.4 MG/1
CAPSULE ORAL
Qty: 180 CAPSULE | Refills: 3 | Status: SHIPPED | OUTPATIENT
Start: 2023-06-14 | End: 2024-06-13

## 2023-06-14 RX ORDER — LOSARTAN POTASSIUM 25 MG/1
TABLET ORAL
Qty: 90 TABLET | Refills: 3 | Status: SHIPPED | OUTPATIENT
Start: 2023-06-14 | End: 2024-04-24

## 2023-06-14 RX ORDER — PRAVASTATIN SODIUM 40 MG
TABLET ORAL
Qty: 90 TABLET | Refills: 3 | Status: SHIPPED | OUTPATIENT
Start: 2023-06-14 | End: 2024-05-03

## 2023-06-14 ASSESSMENT — ENCOUNTER SYMPTOMS
CHILLS: 0
DIZZINESS: 0
FREQUENCY: 0
PARESTHESIAS: 1
JOINT SWELLING: 0
DIARRHEA: 0
HEMATURIA: 0
NAUSEA: 0
HEADACHES: 0
MYALGIAS: 0
DYSURIA: 0
HEARTBURN: 0
FEVER: 0
COUGH: 0
ARTHRALGIAS: 0
SHORTNESS OF BREATH: 0
NERVOUS/ANXIOUS: 0
SORE THROAT: 0
WEAKNESS: 0
PALPITATIONS: 0
ABDOMINAL PAIN: 0
EYE PAIN: 0
CONSTIPATION: 0
HEMATOCHEZIA: 0

## 2023-06-14 ASSESSMENT — ACTIVITIES OF DAILY LIVING (ADL): CURRENT_FUNCTION: NO ASSISTANCE NEEDED

## 2023-06-14 ASSESSMENT — PAIN SCALES - GENERAL: PAINLEVEL: NO PAIN (0)

## 2023-06-14 NOTE — PATIENT INSTRUCTIONS
Patient Education   Personalized Prevention Plan  You are due for the preventive services outlined below.  Your care team is available to assist you in scheduling these services.  If you have already completed any of these items, please share that information with your care team to update in your medical record.  Health Maintenance Due   Topic Date Due     Annual Wellness Visit  10/09/2020     COVID-19 Vaccine (6 - Moderna series) 02/13/2023     Basic Metabolic Panel  04/13/2023     Cholesterol Lab  04/13/2023       Exercise for a Healthier Heart  You may wonder how you can improve the health of your heart. If you re thinking about exercise, you re on the right track. You don t need to become an athlete. But you do need a certain amount of brisk exercise to help strengthen your heart. If you have been diagnosed with a heart condition, your healthcare provider may advise exercise to help your condition. To help make exercise a habit, choose safe, fun activities.      Exercise with a friend. When activity is fun, you're more likely to stick with it.     Before you start  Check with your healthcare provider before starting an exercise program. This is especially important if you haven't been active for a while. It's also important if you have a long-term (chronic) health problem such as heart disease, diabetes, or obesity. Also check with your provider if you're at high risk for having these problems.   Why exercise?  Exercising regularly offers many healthy rewards. It can help you do all of these:     Improve your blood cholesterol level to help prevent further heart trouble.    Lower your blood pressure to help prevent a stroke or heart attack.    Control diabetes or reduce your risk of getting this disease.    Improve your heart and lung function.    Reach and stay at a healthy weight.    Make your muscles stronger so you can stay active.    Prevent falls and fractures by slowing the loss of bone mass  (osteoporosis).    Manage stress better.    Improve your sense of self and your body image.  Exercise tips      Ease into your routine. Set small goals. Then build on them. Talk with your healthcare provider first before starting an exercise routine if you're not sure what your activity level should be.    Exercise on most days. Aim for a total of at least 150 minutes (2 hours and 30 minutes) or more of moderate-intensity aerobic activity each week. You could also do 75 minutes (1 hour and 15 minutes) or more of vigorous-intensity aerobic activity each week. Or try for a combination of both. Moderate activity means that you breathe heavier and your heart rate increases, but you can still talk. Think about doing at least 30 minutes of moderate exercise, 5 times a week. It's OK to work up to the 30-minute period over time. Examples of moderate-intensity activity are brisk walking, gardening, and water aerobics.    Step up your daily activity level.  Along with your exercise program, try being more active the whole day. Walk instead of drive. Or park further away so that you take more steps each day. Do more household tasks or yard work. You may not be able to meet the advised amount of physical activity. But doing some moderate- or vigorous-intensity aerobic activity can help reduce your risk for heart disease. Your healthcare provider can help you figure out what is best for you.    Choose 1 or more activities you enjoy.  Walking is one of the easiest things you can do. You can also try swimming, riding a bike, dancing, or taking an exercise class.    Call 911  Call 911 right away if any of these occur:     Chest pain that doesn't go away quickly with rest    New burning, tightness, pressure, or heaviness in your chest, neck, shoulders, back, or arms    Abnormal or severe shortness of breath    A very fast or irregular heartbeat (palpitations)    Fainting  When to call your healthcare provider  Call your healthcare  provider if you have any of these:     Dizziness or lightheadedness    Mild shortness of breath or chest pain    Increased or new joint or muscle pain    [employer] last reviewed this educational content on 7/1/2022 2000-2022 [employer]. All rights reserved. This information is not intended as a substitute for professional medical care. Always follow your healthcare professional's instructions.          Understanding USDA MyPlate  The USDA has guidelines to help you make healthy food choices. These are called MyPlate. MyPlate shows the food groups that make up healthy meals using the image of a place setting. Before you eat, think about the healthiest choices for what to put on your plate or in your cup or bowl. To learn more about building a healthy plate, visit www.choosemyplate.gov.     The food groups    Fruits. Any fruit or 100% fruit juice counts as part of the Fruit Group. Fruits may be fresh, canned, frozen, or dried, and may be whole, cut-up, or pureed. Make 1/2 of your plate fruits and vegetables.    Vegetables. Any vegetable or 100% vegetable juice counts as a member of the Vegetable Group. Vegetables may be fresh, frozen, canned, or dried. They can be served raw or cooked and may be whole, cut-up, or mashed. Make 1/2 of your plate fruits and vegetables.    Grains. All foods made from grains are part of the Grains Group. These include wheat, rice, oats, cornmeal, and barley. Grains are often used to make foods such as bread, pasta, oatmeal, cereal, tortillas, and grits. Grains should be no more than 1/4 of your plate. At least half of your grains should be whole grains.    Protein. This group includes meat, poultry, seafood, beans and peas, eggs, processed soy products (such as tofu), nuts (including nut butters), and seeds. Make protein choices no more than 1/4 of your plate. Meat and poultry choices should be lean or low fat.    Dairy. The Dairy Group includes all fluid milk products and  foods made from milk that contain calcium, such as yogurt and cheese. (Foods that have little calcium, such as cream, butter, and cream cheese, are not part of this group.) Most dairy choices should be low-fat or fat-free.    Oils. Oils aren't a food group, but they do contain essential nutrients. However it's important to watch your intake of oils. These are fats that are liquid at room temperature. They include canola, corn, olive, soybean, vegetable, and sunflower oil. Foods that are mainly oil include mayonnaise, certain salad dressings, and soft margarines. You likely already get your daily oil allowance from the foods you eat.  Things to limit  Eating healthy also means limiting these things in your diet:    Salt (sodium). Many processed foods have a lot of sodium. To keep sodium intake down, eat fresh vegetables, meats, poultry, and seafood when possible. Purchase low-sodium, reduced-sodium, or no-salt-added food products at the store. And don't add salt to your meals at home. Instead, season them with herbs and spices such as dill, oregano, cumin, and paprika. Or try adding flavor with lemon or lime zest and juice.    Saturated fat. Saturated fats are most often found in animal products such as beef, pork, and chicken. They are often solid at room temperature, such as butter. To reduce your saturated fat intake, choose leaner cuts of meat and poultry. And try healthier cooking methods such as grilling, broiling, roasting, or baking. For a simple lower-fat swap, use plain nonfat yogurt instead of mayonnaise when making potato salad or macaroni salad.    Added sugars. These are sugars added to foods. They are in foods such as ice cream, candy, soda, fruit drinks, sports drinks, energy drinks, cookies, pastries, jams, and syrups. Cut down on added sugars by sharing sweet treats with a family member or friend. You can also choose fruit for dessert, and drink water or other unsweetened beverages.  StayWell last  reviewed this educational content on 6/1/2020 2000-2022 [employer]. All rights reserved. This information is not intended as a substitute for professional medical care. Always follow your healthcare professional's instructions.          Signs of Hearing Loss  Hearing loss is a problem shared by many people. In fact, it's one of the most common health problems, particularly as people age. Most people aged 65 and older have some hearing loss. By age 80, almost everyone does. Hearing loss often occurs slowly over the years. So, you may not realize your hearing has gotten worse.   When sudden hearing loss occurs, it's important to contact your healthcare provider right away. Your provider will do a medical exam and a hearing exam as soon as possible. This is to help find the cause and type of your sudden hearing loss. Based on your diagnosis, your healthcare provider will discuss possible treatments.      Hearing much better with one ear can be a sign of hearing loss.     Have your hearing checked  Call your healthcare provider if you:     Have to strain to hear normal conversation    Have to watch other people s faces very carefully to follow what they re saying    Need to ask people to repeat what they ve said    Often misunderstand what people are saying    Turn the volume of the television or radio up so high that others complain    Feel that people are mumbling when they re talking to you    Find that the effort to hear leaves you feeling tired and irritated    Notice, when using the phone, that you hear better with one ear than the other  StayWell last reviewed this educational content on 6/1/2022 2000-2022 [employer]. All rights reserved. This information is not intended as a substitute for professional medical care. Always follow your healthcare professional's instructions.

## 2023-06-14 NOTE — PROGRESS NOTES
"SUBJECTIVE:   Juaquin is a 82 year old who presents for Preventive Visit.      6/14/2023     9:23 AM   Additional Questions   Roomed by phyllis   Accompanied by alone         6/14/2023     9:23 AM   Patient Reported Additional Medications   Patient reports taking the following new medications none     Are you in the first 12 months of your Medicare coverage?  No    Healthy Habits:     In general, how would you rate your overall health?  Good    Frequency of exercise:  None    Do you usually eat at least 4 servings of fruit and vegetables a day, include whole grains    & fiber and avoid regularly eating high fat or \"junk\" foods?  No    Taking medications regularly:  Yes    Medication side effects:  None    Ability to successfully perform activities of daily living:  No assistance needed    Home Safety:  No safety concerns identified    Hearing Impairment:  Difficulty following a conversation in a noisy restaurant or crowded room, feel that people are mumbling or not speaking clearly, difficult to understand a speaker at a public meeting or [employer] service, need to ask people to speak up or repeat themselves, find that men's voices are easier to understand than woman's, difficulty understanding soft or whispered speech and difficulty understanding speech on the telephone    In the past 6 months, have you been bothered by leaking of urine?  No    In general, how would you rate your overall mental or emotional health?  Good      PHQ-2 Total Score: 0    Additional concerns today:  No    Stays active doing yard work.   Didn't like hearing aids.      Pt reports good control of his BPH.  Does have some urgency.      BP  And lipids controlled.  Denies side effects of the medications.      Continues to have numbness/burning/tingling of his bilateral hands.  Has had 3 carpal tunnel releases on the right and 2 on the left.  Last surgeries were last year.      Have you ever done Advance Care Planning? (For example, a Health " Directive, POLST, or a discussion with a medical provider or your loved ones about your wishes): Yes, advance care planning is on file.       Fall risk  Fallen 2 or more times in the past year?: No  Any fall with injury in the past year?: No    Cognitive Screening   1) Repeat 3 items (Leader, Season, Table)    2) Clock draw: NORMAL  3) 3 item recall: Recalls 2 objects   Results: NORMAL clock, 1-2 items recalled: COGNITIVE IMPAIRMENT LESS LIKELY    Mini-CogTM Copyright GAMALIEL Reyes. Licensed by the author for use in [facility]; reprinted with permission (philipp@[email or web address]). All rights reserved.          Reviewed and updated as needed this visit by clinical staff   Tobacco  Allergies  Meds              Reviewed and updated as needed this visit by Provider                 Social History     Tobacco Use     Smoking status: Former     Packs/day: 0.50     Years: 15.00     Pack years: 7.50     Types: Cigarettes     Quit date: 1981     Years since quittin.4     Smokeless tobacco: Former     Types: Chew     Quit date: 2/10/2017     Tobacco comments:     quit in    Vaping Use     Vaping status: Never Used   Substance Use Topics     Alcohol use: Yes     Comment: beer occ             2023     9:22 AM   Alcohol Use   Prescreen: >3 drinks/day or >7 drinks/week? Yes     Do you have a current opioid prescription? No  Do you use any other controlled substances or medications that are not prescribed by a provider? None            Current providers sharing in care for this patient include:   Patient Care Team:  Timo Howard MD as PCP - General (Family Practice)  Timo Howard MD as Assigned PCP    The following health maintenance items are reviewed in Epic and correct as of today:  Health Maintenance   Topic Date Due     MEDICARE ANNUAL WELLNESS VISIT  10/09/2020     COVID-19 Vaccine (6 - Moderna series) 2023     BMP  2023     LIPID  2023     ANNUAL REVIEW OF HM ORDERS   04/13/2023     ADVANCE CARE PLANNING  10/08/2023     FALL RISK ASSESSMENT  06/14/2024     DTAP/TDAP/TD IMMUNIZATION (2 - Td or Tdap) 07/12/2027     PHQ-2 (once per calendar year)  Completed     INFLUENZA VACCINE  Completed     Pneumococcal Vaccine: 65+ Years  Completed     ZOSTER IMMUNIZATION  Completed     IPV IMMUNIZATION  Aged Out     MENINGITIS IMMUNIZATION  Aged Out     BP Readings from Last 3 Encounters:   06/14/23 116/56   04/13/22 122/70   08/22/21 (!) 151/77    Wt Readings from Last 3 Encounters:   06/14/23 98.2 kg (216 lb 8 oz)   04/13/22 103 kg (227 lb)   08/22/21 104.8 kg (231 lb)                  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     History of colonic polyps     Family history of prostate cancer     Essential hypertension with goal blood pressure less than 140/90     Benign prostatic hyperplasia with urinary retention     Tinnitus     Microalbuminuria     LVH (left ventricular hypertrophy)     HYPERLIPIDEMIA LDL GOAL <130     Advanced directives, counseling/discussion     S/P total knee arthroplasty     DVT prophylaxis     Obesity (BMI 35.0-39.9) with comorbidity (H)     Poor dentition     Abnormal swallowing     Lymphadenopathy - right sided mainly     Dupuytren's contracture of right hand     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     Anejaculation     Past Surgical History:   Procedure Laterality Date     ARTHROPLASTY KNEE  2/20/2012    hemiarthroplasty right knee     ARTHROPLASTY KNEE  9/30/2013    Procedure: ARTHROPLASTY KNEE;  Left Total Knee Arthroplasty;  Surgeon: Jose D Siegel MD;  Location: [facility]     COLONOSCOPY  03/27/07, 06/08/10     COLONOSCOPY N/A 6/16/2015    Procedure: COLONOSCOPY;  Surgeon: Yg Rendon MD;  Location: [facility]     EXCISE MASS HAND  3/29/2013    Procedure: EXCISE MASS HAND;  excision  right hand mass;  Surgeon: Rafa Moore MD;  Location: [facility] OR     HC REPAIR ING HERNIA,5+Y/O,REDUCIBL      X2     HC REVISE MEDIAN N/CARPAL TUNNEL SURG  2000    left wrist     HC REVISE MEDIAN N/CARPAL TUNNEL SURG  1994    right wrist     RELEASE CARPAL TUNNEL  2011    Procedure:RELEASE CARPAL TUNNEL; right carpal tunnel release, right ring and middle finger A-1 pulley releases    ; Surgeon:BARRETT CURRY; Location:[facility] OR     RELEASE TRIGGER FINGER  2011    Procedure:RELEASE TRIGGER FINGER; Surgeon:BARRETT CURRY; Location:[facility] OR     ZZHC COLONOSCOPY W/WO BRUSH/WASH       ZZHC COLONOSCOPY W/WO BRUSH/WASH         Social History     Tobacco Use     Smoking status: Former     Packs/day: 0.50     Years: 15.00     Pack years: 7.50     Types: Cigarettes     Quit date: 1981     Years since quittin.4     Smokeless tobacco: Former     Types: Chew     Quit date: 2/10/2017     Tobacco comments:     quit in    Vaping Use     Vaping status: Never Used   Substance Use Topics     Alcohol use: Yes     Comment: beer occ     Family History   Problem Relation Age of Onset     Heart Disease Mother         doctored for heart problems at young age     Prostate Cancer Father         age 70s     Stomach Cancer Brother         Cancer free now for over 10 years (T: 8/10/17)     Prostate Cancer Brother      Other - See Comments Brother         [place] virus fighting for about 6 weeks 10/08/18     Respiratory Brother         NE, uses CPAP     Cancer - colorectal No family hx of          Current Outpatient Medications   Medication Sig Dispense Refill     ascorbic acid (VITAMIN C) 1000 MG TABS Take 1 tablet by mouth daily.       aspirin 81 MG tablet Take 1 tablet by mouth daily.  3     losartan (COZAAR) 25 MG tablet TAKE 1 TABLET DAILY 90 tablet 0     pravastatin (PRAVACHOL) 40 MG tablet TAKE 1 TABLET DAILY 90 tablet 0     tamsulosin (FLOMAX) 0.4 MG capsule TAKE 2 CAPSULES DAILY 180 capsule 0      "Multiple Vitamins-Minerals (MULTIVITAMIN ADULT PO)  (Patient not taking: Reported on 4/13/2022)       Allergies   Allergen Reactions     No Known Drug Allergy      Recent Labs   Lab Test 04/13/22  0946 08/22/21  1024 12/23/20  1058 05/21/20  1330 10/09/19  0903   LDL 76  --  81  --  76   HDL 38*  --  39*  --  44   TRIG 125  --  158*  --  119   ALT 35  --  30 48 55   CR 0.77 0.83 0.73 0.79 0.78   GFRESTIMATED 89 81 86 84 84   GFRESTBLACK  --   --  >90 >90 >90   POTASSIUM 4.3 4.0 4.5 4.2 4.2   TSH  --   --   --  1.57  --               Review of Systems   Constitutional: Negative for chills and fever.   HENT: Positive for hearing loss. Negative for congestion, ear pain and sore throat.    Eyes: Negative for pain and visual disturbance.   Respiratory: Negative for cough and shortness of breath.    Cardiovascular: Negative for chest pain, palpitations and peripheral edema.   Gastrointestinal: Negative for abdominal pain, constipation, diarrhea, heartburn, hematochezia and nausea.   Genitourinary: Positive for impotence and urgency. Negative for dysuria, frequency, genital sores, hematuria and penile discharge.   Musculoskeletal: Negative for arthralgias, joint swelling and myalgias.   Skin: Negative for rash.   Neurological: Positive for paresthesias. Negative for dizziness, weakness and headaches.   Psychiatric/Behavioral: Negative for mood changes. The patient is not nervous/anxious.          OBJECTIVE:   /56   Pulse 56   Temp 98.4  F (36.9  C) (Temporal)   Resp 22   Ht 1.71 m (5' 7.32\")   Wt 98.2 kg (216 lb 8 oz)   SpO2 95%   BMI 33.59 kg/m   Estimated body mass index is 33.59 kg/m  as calculated from the following:    Height as of this encounter: 1.71 m (5' 7.32\").    Weight as of this encounter: 98.2 kg (216 lb 8 oz).  Physical Exam  GENERAL: healthy, alert and no distress  NECK: no adenopathy, no asymmetry, masses, or scars and thyroid normal to palpation  RESP: lungs clear to auscultation - no rales, " rhonchi or wheezes  CV: regular rate and rhythm, normal S1 S2, no S3 or S4, no murmur, click or rub, no peripheral edema and peripheral pulses strong  ABDOMEN: soft, nontender, no hepatosplenomegaly, no masses and bowel sounds normal  MS: masses of right hand, swelling/bony scar tissue of wrist, slight atrophy of left thenar muscles  SKIN: lipoma of right back.    Diagnostic Test Results:  Labs reviewed in Epic  No results found for this or any previous visit (from the past 24 hour(s)).  No results found for any visits on 06/14/23.    ASSESSMENT / PLAN:       ICD-10-CM    1. Encounter for Medicare annual wellness exam  Z00.00 PRIMARY CARE FOLLOW-UP SCHEDULING      2. Essential hypertension with goal blood pressure less than 140/90  I10 losartan (COZAAR) 25 MG tablet     Comprehensive metabolic panel (BMP + Alb, Alk Phos, ALT, AST, Total. Bili, TP)     Comprehensive metabolic panel (BMP + Alb, Alk Phos, ALT, AST, Total. Bili, TP)      3. Hyperlipidemia LDL goal <130  E78.5 Lipid panel reflex to direct LDL Non-fasting     pravastatin (PRAVACHOL) 40 MG tablet     Comprehensive metabolic panel (BMP + Alb, Alk Phos, ALT, AST, Total. Bili, TP)     Lipid panel reflex to direct LDL Non-fasting     Comprehensive metabolic panel (BMP + Alb, Alk Phos, ALT, AST, Total. Bili, TP)      4. Bilateral carpal tunnel syndrome  G56.03       5. Benign prostatic hyperplasia with urinary retention  N40.1 tamsulosin (FLOMAX) 0.4 MG capsule    R33.8       6. Morbid obesity (H)  E66.01       7. Need for vaccination  Z23 COVID-19 BIVALENT 18+ (MODERNA)      8. Hyperglycemia  R73.9 Hemoglobin A1c          1.  Reviewed recommended screenings and ordered appropriate testing for pt's risks and per pt's request(s).   2.  Clinically well controlled.  We will continue current regimen and check monitoring.  3.  Clinically stable.  Continue current regimen check monitoring labs.  4.  Reviewed past surgical interventions and options for patient.   Recommended use of nighttime cock-up wrist splints as tolerated.  Conversely, further surgeries are unlikely to offer significant benefit.  5.  Clinically.  Stable.  We will continue current medication.  6.  Encouraged lifestyle occasions..  Will follow and assist with this as able.  7.  Immunized.  8.  Incidentally noted on labs today.  We will check A1c today.    Portions of this note were completed using Dragon dictation software.  Although reviewed, there may be typographical and other inadvertent errors that remain.           Patient has been advised of split billing requirements and indicates understanding: Yes      COUNSELING:  Reviewed preventive health counseling, as reflected in patient instructions       Regular exercise       Healthy diet/nutrition       Fall risk prevention       Immunizations    Vaccinated for: Covid-19             Aspirin prophylaxis        The ASCVD Risk score (Barbie DK, et al., 2019) failed to calculate for the following reasons:    The 2019 ASCVD risk score is only valid for ages 40 to 79        He reports that he quit smoking about 42 years ago. He has a 7.50 pack-year smoking history. He quit smokeless tobacco use about 6 years ago.  His smokeless tobacco use included chew.      Appropriate preventive services were discussed with this patient, including applicable screening as appropriate for cardiovascular disease, diabetes, osteopenia/osteoporosis, and glaucoma.  As appropriate for age/gender, discussed screening for colorectal cancer, prostate cancer, breast cancer, and cervical cancer. Checklist reviewing preventive services available has been given to the patient.    Reviewed patients plan of care and provided an AVS. The Intermediate Care Plan ( asthma action plan, low back pain action plan, and migraine action plan) for Juaquin meets the Care Plan requirement. This Care Plan has been established and reviewed with the Patient.          Timo Howard MD, MD  [facility]  [facility]    Identified Health Risks:    I have reviewed Opioid Use Disorder and Substance Use Disorder risk factors and made any needed referrals.       He is at risk for lack of exercise and has been provided with information to increase physical activity for the benefit of his well-being.  The patient was counseled and encouraged to consider modifying their diet and eating habits. He was provided with information on recommended healthy diet options.  The patient was provided with written information regarding signs of hearing loss.

## 2023-06-14 NOTE — LETTER
June 16, 2023      Juaquin Shea  [address]   [place] [address]        Dear ,    We are writing to inform you of your test results.    See comment below.    Resulted Orders   Lipid panel reflex to direct LDL Non-fasting   Result Value Ref Range    Cholesterol 132 <200 mg/dL    Triglycerides 77 <150 mg/dL    Direct Measure HDL 41 >=40 mg/dL    LDL Cholesterol Calculated 76 <=100 mg/dL    Non HDL Cholesterol 91 <130 mg/dL    Narrative    Cholesterol  Desirable:  <200 mg/dL    Triglycerides  Normal:  Less than 150 mg/dL  Borderline High:  150-199 mg/dL  High:  200-499 mg/dL  Very High:  Greater than or equal to 500 mg/dL    Direct Measure HDL  Female:  Greater than or equal to 50 mg/dL   Male:  Greater than or equal to 40 mg/dL    LDL Cholesterol  Desirable:  <100mg/dL  Above Desirable:  100-129 mg/dL   Borderline High:  130-159 mg/dL   High:  160-189 mg/dL   Very High:  >= 190 mg/dL    Non HDL Cholesterol  Desirable:  130 mg/dL  Above Desirable:  130-159 mg/dL  Borderline High:  160-189 mg/dL  High:  190-219 mg/dL  Very High:  Greater than or equal to 220 mg/dL   Comprehensive metabolic panel (BMP + Alb, Alk Phos, ALT, AST, Total. Bili, TP)   Result Value Ref Range    Sodium 141 136 - 145 mmol/L    Potassium 4.3 3.4 - 5.3 mmol/L    Chloride 106 98 - 107 mmol/L    Carbon Dioxide (CO2) 26 22 - 29 mmol/L    Anion Gap 9 7 - 15 mmol/L    Urea Nitrogen 20.4 8.0 - 23.0 mg/dL    Creatinine 0.71 0.67 - 1.17 mg/dL    Calcium 9.4 8.8 - 10.2 mg/dL    Glucose 124 (H) 70 - 99 mg/dL    Alkaline Phosphatase 59 40 - 129 U/L    AST 21 0 - 45 U/L      Comment:      Reference intervals for this test were updated on 6/12/2023 to more accurately reflect our healthy population. There may be differences in the flagging of prior results with similar values performed with this method. Interpretation of those prior results can be made in the context of the updated reference intervals.    ALT 21 0 - 70 U/L      Comment:       Reference intervals for this test were updated on 6/12/2023 to more accurately reflect our healthy population. There may be differences in the flagging of prior results with similar values performed with this method. Interpretation of those prior results can be made in the context of the updated reference intervals.      Protein Total 6.8 6.4 - 8.3 g/dL    Albumin 4.1 3.5 - 5.2 g/dL    Bilirubin Total 0.3 <=1.2 mg/dL    GFR Estimate 90 >60 mL/min/1.73m2      Comment:      eGFR calculated using 2021 CKD-EPI equation.     All of your labs were normal for you except  your blood sugar.     Your blood sugar is in the prediabetic range.  Can try to prevent this from getting worse by exercising regularly and controlling weight and diet.     I will also try to add a test that tells me your 3-month history of blood sugars when we draw your blood next time.     Have a nice day!     Dr. Howard

## 2023-06-15 NOTE — RESULT ENCOUNTER NOTE
All of your labs were normal for you except  your blood sugar.    Your blood sugar is in the prediabetic range.  Can try to prevent this from getting worse by exercising regularly and controlling weight and diet.    I will also try to add a test that tells me your 3-month history of blood sugars when we draw your blood next time.    Have a nice day!    Dr. Howard

## 2023-07-13 ENCOUNTER — TRANSFERRED RECORDS (OUTPATIENT)
Dept: HEALTH INFORMATION MANAGEMENT | Facility: CLINIC | Age: 83
End: 2023-07-13
Payer: COMMERCIAL

## 2023-08-02 ENCOUNTER — TELEPHONE (OUTPATIENT)
Dept: FAMILY MEDICINE | Facility: OTHER | Age: 83
End: 2023-08-02

## 2023-08-02 ENCOUNTER — LAB (OUTPATIENT)
Dept: LAB | Facility: OTHER | Age: 83
End: 2023-08-02
Payer: COMMERCIAL

## 2023-08-02 DIAGNOSIS — R73.9 HYPERGLYCEMIA: ICD-10-CM

## 2023-08-02 LAB — HBA1C MFR BLD: 6 % (ref 0–5.6)

## 2023-08-02 PROCEDURE — 83036 HEMOGLOBIN GLYCOSYLATED A1C: CPT

## 2023-08-02 PROCEDURE — 36415 COLL VENOUS BLD VENIPUNCTURE: CPT

## 2023-08-02 NOTE — TELEPHONE ENCOUNTER
Called patient and notified them of the message below. Patient also has no further questions at this time.        Lilliana Gibson MA

## 2023-08-02 NOTE — TELEPHONE ENCOUNTER
----- Message from Timo Howard MD sent at 8/2/2023 12:57 PM CDT -----  All of your labs were normal for you.    Have a nice day!    Dr. Howard

## 2024-03-06 DIAGNOSIS — E78.5 HYPERLIPIDEMIA LDL GOAL <130: ICD-10-CM

## 2024-03-06 DIAGNOSIS — I10 ESSENTIAL HYPERTENSION WITH GOAL BLOOD PRESSURE LESS THAN 140/90: ICD-10-CM

## 2024-03-07 RX ORDER — PRAVASTATIN SODIUM 40 MG
TABLET ORAL
Qty: 100 TABLET | Refills: 2 | OUTPATIENT
Start: 2024-03-07

## 2024-03-07 RX ORDER — LOSARTAN POTASSIUM 25 MG/1
TABLET ORAL
Qty: 100 TABLET | Refills: 2 | OUTPATIENT
Start: 2024-03-07

## 2024-03-22 DIAGNOSIS — E78.5 HYPERLIPIDEMIA LDL GOAL <130: ICD-10-CM

## 2024-03-22 DIAGNOSIS — I10 ESSENTIAL HYPERTENSION WITH GOAL BLOOD PRESSURE LESS THAN 140/90: ICD-10-CM

## 2024-03-25 RX ORDER — PRAVASTATIN SODIUM 40 MG
TABLET ORAL
Qty: 100 TABLET | Refills: 2 | OUTPATIENT
Start: 2024-03-25

## 2024-03-25 RX ORDER — LOSARTAN POTASSIUM 25 MG/1
TABLET ORAL
Qty: 100 TABLET | Refills: 2 | OUTPATIENT
Start: 2024-03-25

## 2024-03-25 NOTE — TELEPHONE ENCOUNTER
Pharmacy requested refills that are already active on file. Refused request to pharmacy.   Psychology Note    Reason for Referral:  Caridad Eason (MR#: [id number]) was referred to this psychologist by self referral to address issues related to depressoin.    Service Provided:  I met with Caridad today as an outpatient for an individual video Psychotherapy (55 Min) session. She was at home when we spoke. She consented to a video session.  Relevant History and Session Note:  Caridad presented today reporting that she has been feeling good overall. She stated that she has been spending time with friends lately. Discussed e-learning and how this is going.     Assessment:  Caridad presented today as:   Behavior: Alert, Bright and Cooperative  Appearance: Neat and Clean  Speech: logical, coherent and appropriate  Mood/Affect: content  Organization of thought: logical and coherant  Cognition:  She did not appear to have any gross cognitive difficulties that would have prevented her from understanding or interacting with this psychologist.  Insight and Judgment: within normal limits for age  Self-Harm: Suicidal ideation, plan, or intent reported? denied  Access to Weapons:denied  Homicidal Ideation: Homicidal ideation, plan, or intent reported?  denied  Altered Thought Processes: Hallucinations or delusions reported or observed?  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     No current facility-administered medications for this visit.        Recommendations/Plan:  1. I will continue to follow Caridad regularly to provide training in development of better coping strategies in relation to her issues of depression and anxiety.    2. Follow up in 2 week(s).  3. Parent(s) will take Caridad to the nearest emergency room or call 9-1-1 if she ever reports feelings of suicidality or if they believe she may be a threat to self or others.  Caridad will inform her  parent(s) or other responsible adult if she feels unsafe.       Thank you for the opportunity to participate in the care of Caridad. Please feel free to contact me at [phone] should you have any questions about my recommendations.      There were no vitals taken for this visit.  Child and Adolescent Psychologist  [facility]  Phone: [phone]

## 2024-04-23 DIAGNOSIS — I10 ESSENTIAL HYPERTENSION WITH GOAL BLOOD PRESSURE LESS THAN 140/90: ICD-10-CM

## 2024-04-24 RX ORDER — LOSARTAN POTASSIUM 25 MG/1
TABLET ORAL
Qty: 90 TABLET | Refills: 0 | Status: SHIPPED | OUTPATIENT
Start: 2024-04-24 | End: 2024-07-03

## 2024-05-02 DIAGNOSIS — E78.5 HYPERLIPIDEMIA LDL GOAL <130: ICD-10-CM

## 2024-05-03 RX ORDER — PRAVASTATIN SODIUM 40 MG
TABLET ORAL
Qty: 90 TABLET | Refills: 0 | Status: SHIPPED | OUTPATIENT
Start: 2024-05-03 | End: 2024-07-12

## 2024-06-13 DIAGNOSIS — R33.8 BENIGN PROSTATIC HYPERPLASIA WITH URINARY RETENTION: ICD-10-CM

## 2024-06-13 DIAGNOSIS — N40.1 BENIGN PROSTATIC HYPERPLASIA WITH URINARY RETENTION: ICD-10-CM

## 2024-06-13 RX ORDER — TAMSULOSIN HYDROCHLORIDE 0.4 MG/1
CAPSULE ORAL
Qty: 180 CAPSULE | Refills: 0 | Status: SHIPPED | OUTPATIENT
Start: 2024-06-13 | End: 2024-09-16

## 2024-06-13 RX ORDER — TAMSULOSIN HYDROCHLORIDE 0.4 MG/1
CAPSULE ORAL
Qty: 200 CAPSULE | Refills: 2 | OUTPATIENT
Start: 2024-06-13

## 2024-07-02 DIAGNOSIS — I10 ESSENTIAL HYPERTENSION WITH GOAL BLOOD PRESSURE LESS THAN 140/90: ICD-10-CM

## 2024-07-03 RX ORDER — LOSARTAN POTASSIUM 25 MG/1
TABLET ORAL
Qty: 90 TABLET | Refills: 0 | Status: SHIPPED | OUTPATIENT
Start: 2024-07-03 | End: 2024-09-04

## 2024-07-11 DIAGNOSIS — E78.5 HYPERLIPIDEMIA LDL GOAL <130: ICD-10-CM

## 2024-07-12 RX ORDER — PRAVASTATIN SODIUM 40 MG
TABLET ORAL
Qty: 60 TABLET | Refills: 0 | Status: SHIPPED | OUTPATIENT
Start: 2024-07-12 | End: 2024-08-16

## 2024-07-23 ENCOUNTER — TELEPHONE (OUTPATIENT)
Dept: FAMILY MEDICINE | Facility: OTHER | Age: 84
End: 2024-07-23

## 2024-07-23 ENCOUNTER — OFFICE VISIT (OUTPATIENT)
Dept: FAMILY MEDICINE | Facility: OTHER | Age: 84
End: 2024-07-23
Payer: COMMERCIAL

## 2024-07-23 VITALS
WEIGHT: 217 LBS | HEART RATE: 56 BPM | DIASTOLIC BLOOD PRESSURE: 70 MMHG | HEIGHT: 67 IN | SYSTOLIC BLOOD PRESSURE: 136 MMHG | RESPIRATION RATE: 20 BRPM | BODY MASS INDEX: 34.06 KG/M2 | OXYGEN SATURATION: 95 % | TEMPERATURE: 97.5 F

## 2024-07-23 DIAGNOSIS — I10 ESSENTIAL HYPERTENSION WITH GOAL BLOOD PRESSURE LESS THAN 140/90: ICD-10-CM

## 2024-07-23 DIAGNOSIS — G56.01 CARPAL TUNNEL SYNDROME OF RIGHT WRIST: ICD-10-CM

## 2024-07-23 DIAGNOSIS — M71.349 SYNOVIAL CYST OF HAND: ICD-10-CM

## 2024-07-23 DIAGNOSIS — E66.01 MORBID OBESITY (H): ICD-10-CM

## 2024-07-23 DIAGNOSIS — R73.03 PREDIABETES: ICD-10-CM

## 2024-07-23 DIAGNOSIS — E78.5 HYPERLIPIDEMIA LDL GOAL <130: ICD-10-CM

## 2024-07-23 DIAGNOSIS — Z01.818 PREOP GENERAL PHYSICAL EXAM: ICD-10-CM

## 2024-07-23 DIAGNOSIS — M72.0 DUPUYTREN'S CONTRACTURE OF RIGHT HAND: Primary | ICD-10-CM

## 2024-07-23 DIAGNOSIS — E78.2 MIXED HYPERLIPIDEMIA: ICD-10-CM

## 2024-07-23 LAB
ANION GAP SERPL CALCULATED.3IONS-SCNC: 10 MMOL/L (ref 7–15)
BUN SERPL-MCNC: 16.8 MG/DL (ref 8–23)
CALCIUM SERPL-MCNC: 9.3 MG/DL (ref 8.8–10.4)
CHLORIDE SERPL-SCNC: 106 MMOL/L (ref 98–107)
CHOLEST SERPL-MCNC: 137 MG/DL
CREAT SERPL-MCNC: 0.67 MG/DL (ref 0.67–1.17)
EGFRCR SERPLBLD CKD-EPI 2021: >90 ML/MIN/1.73M2
ERYTHROCYTE [DISTWIDTH] IN BLOOD BY AUTOMATED COUNT: 12.7 % (ref 10–15)
FASTING STATUS PATIENT QL REPORTED: YES
FASTING STATUS PATIENT QL REPORTED: YES
GLUCOSE SERPL-MCNC: 101 MG/DL (ref 70–99)
HBA1C MFR BLD: 5.9 % (ref 0–5.6)
HCO3 SERPL-SCNC: 23 MMOL/L (ref 22–29)
HCT VFR BLD AUTO: 45.6 % (ref 40–53)
HDLC SERPL-MCNC: 41 MG/DL
HGB BLD-MCNC: 15 G/DL (ref 13.3–17.7)
LDLC SERPL CALC-MCNC: 80 MG/DL
MCH RBC QN AUTO: 30.4 PG (ref 26.5–33)
MCHC RBC AUTO-ENTMCNC: 32.9 G/DL (ref 31.5–36.5)
MCV RBC AUTO: 93 FL (ref 78–100)
NONHDLC SERPL-MCNC: 96 MG/DL
PLATELET # BLD AUTO: 159 10E3/UL (ref 150–450)
POTASSIUM SERPL-SCNC: 4.2 MMOL/L (ref 3.4–5.3)
RBC # BLD AUTO: 4.93 10E6/UL (ref 4.4–5.9)
SODIUM SERPL-SCNC: 139 MMOL/L (ref 135–145)
TRIGL SERPL-MCNC: 80 MG/DL
WBC # BLD AUTO: 8.1 10E3/UL (ref 4–11)

## 2024-07-23 PROCEDURE — 99214 OFFICE O/P EST MOD 30 MIN: CPT

## 2024-07-23 PROCEDURE — 80061 LIPID PANEL: CPT

## 2024-07-23 PROCEDURE — 85027 COMPLETE CBC AUTOMATED: CPT

## 2024-07-23 PROCEDURE — 80048 BASIC METABOLIC PNL TOTAL CA: CPT

## 2024-07-23 PROCEDURE — 36415 COLL VENOUS BLD VENIPUNCTURE: CPT

## 2024-07-23 PROCEDURE — 83036 HEMOGLOBIN GLYCOSYLATED A1C: CPT

## 2024-07-23 ASSESSMENT — PAIN SCALES - GENERAL: PAINLEVEL: NO PAIN (0)

## 2024-07-23 NOTE — PATIENT INSTRUCTIONS

## 2024-07-23 NOTE — PROGRESS NOTES
Preoperative Evaluation  [facility]  [address]  [place] [address]  Phone: [phone]  Primary Provider: Timo Howard MD, MD  Pre-op Performing Provider: YOLI Rodrigues CNP  Jul 23, 2024 7/23/2024   Surgical Information   What procedure is being done? hand surgery   Facility or Hospital where procedure/surgery will be performed: [facility]   Who is doing the procedure / surgery? Dr Lizzeth Edward   Date of surgery / procedure: july 26 2024   Time of surgery / procedure: 1145   Where do you plan to recover after surgery? at home with family      Fax number for surgical facility: [phone] and needs a hard copy printed to bring    Assessment & Plan     The proposed surgical procedure is considered INTERMEDIATE risk.    1. Preop general physical exam  Patient is a 84 year-old male with hypertension and hyperlipidemia presenting for preoperative physical exam. Meets 4 METS. Preoperative labs pending. No EKG required, no history of coronary heart disease, significant arrhythmia, peripheral arterial disease or other structural heart disease. Discussed medication that require holding prior to procedure. Patient is otherwise medically optimized for the above procedure pending labs.     2. Dupuytren's contracture of right hand  Following with hand specialist, plan for above procedure. Preoperative labs pending.   - Basic metabolic panel  (Ca, Cl, CO2, Creat, Gluc, K, Na, BUN); Future  - CBC with platelets; Future    3. Synovial cyst of hand  Following with hand specialist, plan for above procedure. Preoperative labs pending.  - Basic metabolic panel  (Ca, Cl, CO2, Creat, Gluc, K, Na, BUN); Future  - CBC with platelets; Future    4. 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 Preoperative labs pending.  - Basic metabolic panel  (Ca, Cl, CO2, Creat, Gluc, K, Na,  BUN); Future  - CBC with platelets; Future    5. Essential hypertension with goal blood pressure less than 140/90  Well controlled during encounter. Hold losartan morning of planned procedure.     6. Prediabetes  Previous screening was indicative of prediabetes. Plan to check hemoglobin A1c today, results pending.   - Hemoglobin A1c; Future    7. Hyperlipidemia LDL goal <130  Stable on current medication regimen. Due for lipid panel, labs pending.   - Lipid panel reflex to direct LDL Non-fasting; Future    8. Mixed hyperlipidemia  As above.     9. Morbid obesity (H)  Contributes to complexity.        - No identified additional risk factors other than previously addressed    Preoperative Medication Instructions  Antiplatelet or Anticoagulation Medication Instructions   - Patient is on no antiplatelet or anticoagulation medications.     Additional Medication Instructions   - ACE/ARB: DO NOT TAKE on day of surgery (minimum 11 hours for general anesthesia).   - Statins: Continue taking on the day of surgery.    - Herbal medications and vitamins: DO NOT TAKE 14 days prior to surgery.   - ibuprofen (Advil, Motrin): DO NOT TAKE 1 day before surgery.    - naproxen (Aleve, Naprosyn): DO NOT TAKE 4 days before surgery.     Recommendation  Approval given to proceed with proposed procedure pending review of diagnostic evaluation.    Leslie Reza is a 84 year old, presenting for the following:  Pre-Op Exam          7/23/2024     1:21 PM   Additional Questions   Roomed by Viki OLIVER related to upcoming procedure: Right carpal tunnel        7/23/2024   Pre-Op Questionnaire   Have you ever had a heart attack or stroke? No   Have you ever had surgery on your heart or blood vessels, such as a stent placement, a coronary artery bypass, or surgery on an artery in your head, neck, heart, or legs? No   Do you have chest pain with activity? No   Do you have a history of heart failure? No   Do you currently have a cold,  bronchitis or symptoms of other infection? No   Do you have a cough, shortness of breath, or wheezing? No   Do you or anyone in your family have previous history of blood clots? No   Do you or does anyone in your family have a serious bleeding problem such as prolonged bleeding following surgeries or cuts? No   Have you ever had problems with anemia or been told to take iron pills? No   Have you had any abnormal blood loss such as black, tarry or bloody stools? No   Have you ever had a blood transfusion? No   Are you willing to have a blood transfusion if it is medically needed before, during, or after your surgery? Yes   Have you or any of your relatives ever had problems with anesthesia? No   Do you have sleep apnea, excessive snoring or daytime drowsiness? No   Do you have any artifical heart valves or other implanted medical devices like a pacemaker, defibrillator, or continuous glucose monitor? No   Do you have artificial joints? (!) YES- bilateral knee   Are you allergic to latex? No      Health Care Directive  Patient does not have a Health Care Directive or Living Will: Patient states has Advance Directive and will bring in a copy to clinic.    Preoperative Review of    reviewed - no record of controlled substances prescribed.      Status of Chronic Conditions:  HYPERLIPIDEMIA - Patient has a long history of significant Hyperlipidemia requiring medication for treatment with recent good control. Patient reports no problems or side effects with the medication.     HYPERTENSION - Patient has longstanding history of HTN , currently denies any symptoms referable to elevated blood pressure. Specifically denies chest pain, palpitations, dyspnea, orthopnea, PND or peripheral edema. Blood pressure readings have been in normal range. Current medication regimen is as listed below. Patient denies any side effects of medication.     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  Most recent 2011      Dupuytren's contracture of right hand 02/10/2021     Priority: Medium    Poor dentition 05/21/2020     Priority: Medium    Abnormal swallowing 05/21/2020     Priority: Medium    Lymphadenopathy - right sided mainly 05/21/2020     Priority: Medium    Obesity (BMI 35.0-39.9) with comorbidity (H) 10/08/2018     Priority: Medium    S/P total knee arthroplasty 09/30/2013     Priority: Medium     left      DVT prophylaxis 09/30/2013     Priority: Medium    HYPERLIPIDEMIA LDL GOAL <130 10/31/2010     Priority: Medium    Microalbuminuria 05/18/2010     Priority: Medium    History of colonic polyps 05/17/2010     Priority: Medium     Adenoma 2007  Problem list name updated by automated process. Provider to review      Family history of prostate cancer 05/17/2010     Priority: Medium    Essential hypertension with goal blood pressure less than 140/90 05/17/2010     Priority: Medium    Benign prostatic hyperplasia with urinary retention 05/17/2010     Priority: Medium    Tinnitus 05/17/2010     Priority: Medium    LVH (left ventricular hypertrophy) 05/17/2010     Priority: Medium     suspected, by voltage criteria on ECG      Obesity      Priority: Medium    Hyperlipidemia      Priority: Medium     Problem list name updated by automated process. 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     Priority: Medium      Past Medical History:   Diagnosis Date    Benign neoplasm of colon 2007    adenomatous colon polyps removed    Degeneration of cervical intervertebral disc 2002    multi-level    Degeneration of lumbar or lumbosacral intervertebral disc 2007    multi-level     Erectile dysfunction     Hypertrophy of breast 2004    benign US consistent with gynecomastia    Impaired fasting glucose 2006    fasting     LVH (left ventricular hypertrophy) 5/17/10    suspected, by voltage criteria on ECG    Microalbuminuria 5/18/2010    Nonspecific abnormal results of liver function study 2006        Obesity     Other and unspecified hyperlipidemia 2006    Spinal stenosis, lumbar region, without neurogenic claudication 2007    mod-severe at L2-3 on MRI    Tinnitus 5/17/2010     Past Surgical History:   Procedure Laterality Date    ARTHROPLASTY KNEE  2/20/2012    hemiarthroplasty right knee    ARTHROPLASTY KNEE  9/30/2013    Procedure: ARTHROPLASTY KNEE;  Left Total Knee Arthroplasty;  Surgeon: Jose D Siegel MD;  Location: [facility] OR    COLONOSCOPY  03/27/07, 06/08/10    COLONOSCOPY N/A 6/16/2015    Procedure: COLONOSCOPY;  Surgeon: Yg Rendon MD;  Location: [facility] GI    EXCISE MASS HAND  3/29/2013    Procedure: EXCISE MASS HAND;  excision right hand mass;  Surgeon: Rafa Moore MD;  Location: [facility] OR    HC REPAIR ING HERNIA,5+Y/O,REDUCIBL      X2    HC REVISE MEDIAN N/CARPAL TUNNEL SURG  5/11/2000    left wrist    HC REVISE MEDIAN N/CARPAL TUNNEL SURG  7/21/1994    right wrist    RELEASE CARPAL TUNNEL  12/8/2011    Procedure:RELEASE CARPAL TUNNEL; right carpal tunnel release, right ring and middle finger A-1 pulley releases    ; Surgeon:BARRETT CURRY; Location:[facility] OR    RELEASE TRIGGER FINGER  12/8/2011    Procedure:RELEASE TRIGGER FINGER; Surgeon:BARRETT CURRY; Location:[facility] OR    ZZHC COLONOSCOPY W/WO BRUSH/WASH  2000    ZZHC COLONOSCOPY W/WO BRUSH/WASH  2004     Current Outpatient Medications   Medication Sig Dispense Refill    ascorbic acid (VITAMIN C) 1000 MG TABS Take 1 tablet by mouth daily.      losartan (COZAAR) 25 MG tablet TAKE 1 TABLET BY MOUTH DAILY 90 tablet 0    pravastatin (PRAVACHOL) 40 MG tablet TAKE 1 TABLET BY MOUTH DAILY 60 tablet 0    tamsulosin  "(FLOMAX) 0.4 MG capsule TAKE 2 CAPSULES DAILY 180 capsule 0    aspirin 81 MG tablet Take 1 tablet by mouth daily. (Patient not taking: Reported on 2024)  3     Allergies   Allergen Reactions    No Known Drug Allergy       Social History     Tobacco Use    Smoking status: Former     Current packs/day: 0.00     Average packs/day: 0.5 packs/day for 15.0 years (7.5 ttl pk-yrs)     Types: Cigarettes     Start date: 1966     Quit date: 1981     Years since quittin.5    Smokeless tobacco: Former     Types: Chew     Quit date: 2/10/2017    Tobacco comments:     quit in    Substance Use Topics    Alcohol use: Yes     Comment: beer occ     History   Drug Use No       Review of Systems  Constitutional, neuro, ENT, endocrine, pulmonary, cardiac, gastrointestinal, genitourinary, musculoskeletal, integument and psychiatric systems are negative, except as otherwise noted.    Objective    /70   Pulse 56   Temp 97.5  F (36.4  C) (Temporal)   Resp 20   Ht 1.71 m (5' 7.32\")   Wt 98.4 kg (217 lb)   SpO2 95%   BMI 33.66 kg/m     Estimated body mass index is 33.66 kg/m  as calculated from the following:    Height as of this encounter: 1.71 m (5' 7.32\").    Weight as of this encounter: 98.4 kg (217 lb).  Physical Exam  GENERAL: alert and no distress  EYES: Eyes grossly normal to inspection, PERRL and conjunctivae and sclerae normal  HENT: ear canals and TM's normal, nose and mouth without ulcers or lesions  NECK: no adenopathy, no asymmetry, masses, or scars  RESP: lungs clear to auscultation - no rales, rhonchi or wheezes  CV: regular rate and rhythm, normal S1 S2, no S3 or S4, no murmur, click or rub, no peripheral edema  ABDOMEN: soft, nontender, no hepatosplenomegaly, no masses and bowel sounds normal  MS: no gross musculoskeletal defects noted, no edema. Dupuytren's contracture of right hand and cyst of right thenar eminence.   SKIN: no suspicious lesions or rashes  NEURO: Normal strength and tone, " mentation intact and speech normal  PSYCH: mentation appears normal, affect normal/bright    Recent Labs   Lab Test 08/02/23  0804   A1C 6.0*      Diagnostics  Labs pending at this time.  Results will be reviewed when available.   No EKG required for low risk surgery (cataract, skin procedure, breast biopsy, etc).    Revised Cardiac Risk Index (RCRI)  The patient has the following serious cardiovascular risks for perioperative complications:   - No serious cardiac risks = 0 points     RCRI Interpretation: 0 points: Class I (very low risk - 0.4% complication rate)     Signed Electronically by: YOLI Rodrigues CNP  A copy of this evaluation report is provided to the requesting physician.

## 2024-07-23 NOTE — LETTER
July 24, 2024      Juaquin Shea  [address]   [place] [address]        Dear ,    We are writing to inform you of your test results.    Your test results fall within the expected range(s) or remain unchanged from previous results.  Please continue with current treatment plan. The test checking the 3 month blood sugar average is improved from last testing. Continue with well balanced diet and daily physical activity to prevent progression to type 2 diabetes. OK to proceed with planned procedure from my standpoint.     Resulted Orders   Basic metabolic panel  (Ca, Cl, CO2, Creat, Gluc, K, Na, BUN)   Result Value Ref Range    Sodium 139 135 - 145 mmol/L    Potassium 4.2 3.4 - 5.3 mmol/L    Chloride 106 98 - 107 mmol/L    Carbon Dioxide (CO2) 23 22 - 29 mmol/L    Anion Gap 10 7 - 15 mmol/L    Urea Nitrogen 16.8 8.0 - 23.0 mg/dL    Creatinine 0.67 0.67 - 1.17 mg/dL    GFR Estimate >90 >60 mL/min/1.73m2      Comment:      eGFR calculated using 2021 CKD-EPI equation.    Calcium 9.3 8.8 - 10.4 mg/dL      Comment:      Reference intervals for this test were updated on 7/16/2024 to reflect our healthy population more accurately. There may be differences in the flagging of prior results with similar values performed with this method. Those prior results can be interpreted in the context of the updated reference intervals.    Glucose 101 (H) 70 - 99 mg/dL    Patient Fasting > 8hrs? Yes    CBC with platelets   Result Value Ref Range    WBC Count 8.1 4.0 - 11.0 10e3/uL    RBC Count 4.93 4.40 - 5.90 10e6/uL    Hemoglobin 15.0 13.3 - 17.7 g/dL    Hematocrit 45.6 40.0 - 53.0 %    MCV 93 78 - 100 fL    MCH 30.4 26.5 - 33.0 pg    MCHC 32.9 31.5 - 36.5 g/dL    RDW 12.7 10.0 - 15.0 %    Platelet Count 159 150 - 450 10e3/uL   Hemoglobin A1c   Result Value Ref Range    Hemoglobin A1C 5.9 (H) 0.0 - 5.6 %      Comment:      Normal <5.7%   Prediabetes 5.7-6.4%    Diabetes 6.5% or higher     Note: Adopted from ADA consensus  guidelines.   Lipid panel reflex to direct LDL Non-fasting   Result Value Ref Range    Cholesterol 137 <200 mg/dL    Triglycerides 80 <150 mg/dL    Direct Measure HDL 41 >=40 mg/dL    LDL Cholesterol Calculated 80 <=100 mg/dL    Non HDL Cholesterol 96 <130 mg/dL    Patient Fasting > 8hrs? Yes     Narrative    Cholesterol  Desirable:  <200 mg/dL    Triglycerides  Normal:  Less than 150 mg/dL  Borderline High:  150-199 mg/dL  High:  200-499 mg/dL  Very High:  Greater than or equal to 500 mg/dL    Direct Measure HDL  Female:  Greater than or equal to 50 mg/dL   Male:  Greater than or equal to 40 mg/dL    LDL Cholesterol  Desirable:  <100mg/dL  Above Desirable:  100-129 mg/dL   Borderline High:  130-159 mg/dL   High:  160-189 mg/dL   Very High:  >= 190 mg/dL    Non HDL Cholesterol  Desirable:  130 mg/dL  Above Desirable:  130-159 mg/dL  Borderline High:  160-189 mg/dL  High:  190-219 mg/dL  Very High:  Greater than or equal to 220 mg/dL       If you have any questions or concerns, please call the clinic at the number listed above.       Sincerely,      YOLI Rodrigues CNP

## 2024-08-15 DIAGNOSIS — E78.5 HYPERLIPIDEMIA LDL GOAL <130: ICD-10-CM

## 2024-08-16 RX ORDER — PRAVASTATIN SODIUM 40 MG
TABLET ORAL
Qty: 60 TABLET | Refills: 0 | Status: SHIPPED | OUTPATIENT
Start: 2024-08-16 | End: 2024-09-16

## 2024-09-03 DIAGNOSIS — I10 ESSENTIAL HYPERTENSION WITH GOAL BLOOD PRESSURE LESS THAN 140/90: ICD-10-CM

## 2024-09-04 RX ORDER — LOSARTAN POTASSIUM 25 MG/1
TABLET ORAL
Qty: 90 TABLET | Refills: 0 | Status: SHIPPED | OUTPATIENT
Start: 2024-09-04 | End: 2024-09-16

## 2024-09-16 ENCOUNTER — TELEPHONE (OUTPATIENT)
Dept: FAMILY MEDICINE | Facility: OTHER | Age: 84
End: 2024-09-16

## 2024-09-16 ENCOUNTER — OFFICE VISIT (OUTPATIENT)
Dept: FAMILY MEDICINE | Facility: OTHER | Age: 84
End: 2024-09-16
Payer: COMMERCIAL

## 2024-09-16 ENCOUNTER — DOCUMENTATION ONLY (OUTPATIENT)
Dept: OTHER | Facility: CLINIC | Age: 84
End: 2024-09-16

## 2024-09-16 VITALS
WEIGHT: 217.5 LBS | OXYGEN SATURATION: 96 % | HEART RATE: 51 BPM | SYSTOLIC BLOOD PRESSURE: 136 MMHG | HEIGHT: 67 IN | TEMPERATURE: 97.6 F | BODY MASS INDEX: 34.14 KG/M2 | DIASTOLIC BLOOD PRESSURE: 64 MMHG | RESPIRATION RATE: 11 BRPM

## 2024-09-16 DIAGNOSIS — G89.29 CHRONIC PAIN OF BOTH KNEES: ICD-10-CM

## 2024-09-16 DIAGNOSIS — N40.1 BENIGN PROSTATIC HYPERPLASIA WITH URINARY RETENTION: ICD-10-CM

## 2024-09-16 DIAGNOSIS — Z00.00 ENCOUNTER FOR MEDICARE ANNUAL WELLNESS EXAM: Primary | ICD-10-CM

## 2024-09-16 DIAGNOSIS — M25.562 CHRONIC PAIN OF BOTH KNEES: ICD-10-CM

## 2024-09-16 DIAGNOSIS — E78.5 HYPERLIPIDEMIA LDL GOAL <130: ICD-10-CM

## 2024-09-16 DIAGNOSIS — R73.03 PREDIABETES: ICD-10-CM

## 2024-09-16 DIAGNOSIS — M17.0 PRIMARY OSTEOARTHRITIS OF BOTH KNEES: ICD-10-CM

## 2024-09-16 DIAGNOSIS — Z96.652 STATUS POST TOTAL LEFT KNEE REPLACEMENT: ICD-10-CM

## 2024-09-16 DIAGNOSIS — R33.8 BENIGN PROSTATIC HYPERPLASIA WITH URINARY RETENTION: ICD-10-CM

## 2024-09-16 DIAGNOSIS — M25.561 CHRONIC PAIN OF BOTH KNEES: ICD-10-CM

## 2024-09-16 DIAGNOSIS — E66.01 MORBID OBESITY (H): ICD-10-CM

## 2024-09-16 DIAGNOSIS — I10 ESSENTIAL HYPERTENSION WITH GOAL BLOOD PRESSURE LESS THAN 140/90: ICD-10-CM

## 2024-09-16 DIAGNOSIS — M79.89 SWELLING OF RIGHT HAND: ICD-10-CM

## 2024-09-16 PROCEDURE — 99214 OFFICE O/P EST MOD 30 MIN: CPT | Mod: 25 | Performed by: FAMILY MEDICINE

## 2024-09-16 PROCEDURE — 90662 IIV NO PRSV INCREASED AG IM: CPT | Performed by: FAMILY MEDICINE

## 2024-09-16 PROCEDURE — G0008 ADMIN INFLUENZA VIRUS VAC: HCPCS | Performed by: FAMILY MEDICINE

## 2024-09-16 PROCEDURE — G0439 PPPS, SUBSEQ VISIT: HCPCS | Performed by: FAMILY MEDICINE

## 2024-09-16 RX ORDER — TAMSULOSIN HYDROCHLORIDE 0.4 MG/1
CAPSULE ORAL
Qty: 180 CAPSULE | Refills: 0 | Status: SHIPPED | OUTPATIENT
Start: 2024-09-16 | End: 2024-09-16

## 2024-09-16 RX ORDER — PRAVASTATIN SODIUM 40 MG
TABLET ORAL
Qty: 60 TABLET | Refills: 0 | Status: SHIPPED | OUTPATIENT
Start: 2024-09-16 | End: 2024-09-16

## 2024-09-16 RX ORDER — LOSARTAN POTASSIUM 25 MG/1
TABLET ORAL
Qty: 90 TABLET | Refills: 0 | Status: SHIPPED | OUTPATIENT
Start: 2024-09-16 | End: 2024-09-16

## 2024-09-16 RX ORDER — LOSARTAN POTASSIUM 25 MG/1
TABLET ORAL
Qty: 90 TABLET | Refills: 3 | Status: SHIPPED | OUTPATIENT
Start: 2024-09-16

## 2024-09-16 RX ORDER — MELOXICAM 15 MG/1
7.5-15 TABLET ORAL DAILY PRN
Qty: 30 TABLET | Refills: 0 | Status: SHIPPED | OUTPATIENT
Start: 2024-09-16 | End: 2024-10-07

## 2024-09-16 RX ORDER — PRAVASTATIN SODIUM 40 MG
TABLET ORAL
Qty: 180 TABLET | Refills: 3 | Status: SHIPPED | OUTPATIENT
Start: 2024-09-16

## 2024-09-16 RX ORDER — TAMSULOSIN HYDROCHLORIDE 0.4 MG/1
CAPSULE ORAL
Qty: 180 CAPSULE | Refills: 3 | Status: SHIPPED | OUTPATIENT
Start: 2024-09-16

## 2024-09-16 ASSESSMENT — PAIN SCALES - GENERAL: PAINLEVEL: NO PAIN (0)

## 2024-09-16 NOTE — PATIENT INSTRUCTIONS
If your X-ray doesn't show a clear cause for your symptoms, we can consider having you see orthopedics for your knee pain.      Take meloxicam for 1 week to help with hand swelling and knee pain.  Stop if side effects.        Patient Education   Preventive Care Advice   This is general advice given by our system to help you stay healthy. However, your care team may have specific advice just for you. Please talk to your care team about your preventive care needs.  Nutrition  Eat 5 or more servings of fruits and vegetables each day.  Try wheat bread, brown rice and whole grain pasta (instead of white bread, rice, and pasta).  Get enough calcium and vitamin D. Check the label on foods and aim for 100% of the RDA (recommended daily allowance).  Lifestyle  Exercise at least 150 minutes each week  (30 minutes a day, 5 days a week).  Do muscle strengthening activities 2 days a week. These help control your weight and prevent disease.  No smoking.  Wear sunscreen to prevent skin cancer.  Have a dental exam and cleaning every 6 months.  Yearly exams  See your health care team every year to talk about:  Any changes in your health.  Any medicines your care team has prescribed.  Preventive care, family planning, and ways to prevent chronic diseases.  Shots (vaccines)   HPV shots (up to age 26), if you've never had them before.  Hepatitis B shots (up to age 59), if you've never had them before.  COVID-19 shot: Get this shot when it's due.  Flu shot: Get a flu shot every year.  Tetanus shot: Get a tetanus shot every 10 years.  Pneumococcal, hepatitis A, and RSV shots: Ask your care team if you need these based on your risk.  Shingles shot (for age 50 and up)  General health tests  Diabetes screening:  Starting at age 35, Get screened for diabetes at least every 3 years.  If you are younger than age 35, ask your care team if you should be screened for diabetes.  Cholesterol test: At age 39, start having a cholesterol test every 5  years, or more often if advised.  Bone density scan (DEXA): At age 50, ask your care team if you should have this scan for osteoporosis (brittle bones).  Hepatitis C: Get tested at least once in your life.  STIs (sexually transmitted infections)  Before age 24: Ask your care team if you should be screened for STIs.  After age 24: Get screened for STIs if you're at risk. You are at risk for STIs (including HIV) if:  You are sexually active with more than one person.  You don't use condoms every time.  You or a partner was diagnosed with a sexually transmitted infection.  If you are at risk for HIV, ask about PrEP medicine to prevent HIV.  Get tested for HIV at least once in your life, whether you are at risk for HIV or not.  Cancer screening tests  Cervical cancer screening: If you have a cervix, begin getting regular cervical cancer screening tests starting at age 21.  Breast cancer scan (mammogram): If you've ever had breasts, begin having regular mammograms starting at age 40. This is a scan to check for breast cancer.  Colon cancer screening: It is important to start screening for colon cancer at age 45.  Have a colonoscopy test every 10 years (or more often if you're at risk) Or, ask your provider about stool tests like a FIT test every year or Cologuard test every 3 years.  To learn more about your testing options, visit:   .  For help making a decision, visit:   https://bit.ly/ro14271.  Prostate cancer screening test: If you have a prostate, ask your care team if a prostate cancer screening test (PSA) at age 55 is right for you.  Lung cancer screening: If you are a current or former smoker ages 50 to 80, ask your care team if ongoing lung cancer screenings are right for you.  For informational purposes only. Not to replace the advice of your health care provider. Copyright   2023 [employer]. All rights reserved. Clinically reviewed by the [facility]itions Program. [employer] 784330 -  REV 01/24.  Preventing Falls: Care Instructions  Injuries and health problems such as trouble walking or poor eyesight can increase your risk of falling. So can some medicines. But there are things you can do to help prevent falls. You can exercise to get stronger. You can also arrange your home to make it safer.    Talk to your doctor about the medicines you take. Ask if any of them increase the risk of falls and whether they can be changed or stopped.   Try to exercise regularly. It can help improve your strength and balance. This can help lower your risk of falling.     Practice fall safety and prevention.    Wear low-heeled shoes that fit well and give your feet good support. Talk to your doctor if you have foot problems that make this hard.  Carry a cellphone or wear a medical alert device that you can use to call for help.  Use stepladders instead of chairs to reach high objects. Don't climb if you're at risk for falls. Ask for help, if needed.  Wear the correct eyeglasses, if you need them.    Make your home safer.    Remove rugs, cords, clutter, and furniture from walkways.  Keep your house well lit. Use night-lights in hallways and bathrooms.  Install and use sturdy handrails on stairways.  Wear nonskid footwear, even inside. Don't walk barefoot or in socks without shoes.    Be safe outside.    Use handrails, curb cuts, and ramps whenever possible.  Keep your hands free by using a shoulder bag or backpack.  Try to walk in well-lit areas. Watch out for uneven ground, changes in pavement, and debris.  Be careful in the winter. Walk on the grass or gravel when sidewalks are slippery. Use de-icer on steps and walkways. Add non-slip devices to shoes.    Put grab bars and nonskid mats in your shower or tub and near the toilet. Try to use a shower chair or bath bench when bathing.   Get into a tub or shower by putting in your weaker leg first. Get out with your strong side first. Have a phone or medical alert  "device in the bathroom with you.   Where can you learn more?  Go to https://www.[employer].net/patiented  Enter G117 in the search box to learn more about \"Preventing Falls: Care Instructions.\"  Current as of: July 17, 2023               Content Version: 14.0    9293-7670 [employer].   Care instructions adapted under license by your healthcare professional. If you have questions about a medical condition or this instruction, always ask your healthcare professional. [employer] disclaims any warranty or liability for your use of this information.      Hearing Loss: Care Instructions  Overview     Hearing loss is a sudden or slow decrease in how well you hear. It can range from slight to profound. Permanent hearing loss can occur with aging. It also can happen when you are exposed long-term to loud noise. Examples include listening to loud music, riding motorcycles, or being around other loud machines.  Hearing loss can affect your work and home life. It can make you feel lonely or depressed. You may feel that you have lost your independence. But hearing aids and other devices can help you hear better and feel connected to others.  Follow-up care is a key part of your treatment and safety. Be sure to make and go to all appointments, and call your doctor if you are having problems. It's also a good idea to know your test results and keep a list of the medicines you take.  How can you care for yourself at home?  Avoid loud noises whenever possible. This helps keep your hearing from getting worse.  Always wear hearing protection around loud noises.  Wear a hearing aid as directed.  A professional can help you pick a hearing aid that will work best for you.  You can also get hearing aids over the counter for mild to moderate hearing loss.  Have hearing tests as your doctor suggests. They can show whether your hearing has changed. Your hearing aid may need to be adjusted.  Use other devices as " "needed. These may include:  Telephone amplifiers and hearing aids that can connect to a television, stereo, radio, or microphone.  Devices that use lights or vibrations. These alert you to the doorbell, a ringing telephone, or a baby monitor.  Television closed-captioning. This shows the words at the bottom of the screen. Most new TVs can do this.  TTY (text telephone). This lets you type messages back and forth on the telephone instead of talking or listening. These devices are also called TDD. When messages are typed on the keyboard, they are sent over the phone line to a receiving TTY. The message is shown on a monitor.  Use text messaging, social media, and email if it is hard for you to communicate by telephone.  Try to learn a listening technique called speechreading. It is not lipreading. You pay attention to people's gestures, expressions, posture, and tone of voice. These clues can help you understand what a person is saying. Face the person you are talking to, and have them face you. Make sure the lighting is good. You need to see the other person's face clearly.  Think about counseling if you need help to adjust to your hearing loss.  When should you call for help?  Watch closely for changes in your health, and be sure to contact your doctor if:    You think your hearing is getting worse.     You have new symptoms, such as dizziness or nausea.   Where can you learn more?  Go to https://www.[employer].net/patiented  Enter R798 in the search box to learn more about \"Hearing Loss: Care Instructions.\"  Current as of: September 27, 2023               Content Version: 14.0    0831-6149 [employer].   Care instructions adapted under license by your healthcare professional. If you have questions about a medical condition or this instruction, always ask your healthcare professional. [employer] disclaims any warranty or liability for your use of this information.         "

## 2024-09-16 NOTE — TELEPHONE ENCOUNTER
Per ' - patients health care directive is scanned into the media tab and wondering how we can get that moved to the code area of his chart?

## 2024-09-16 NOTE — PROGRESS NOTES
Preventive Care Visit  [facility]  Timo Howard MD, MD, Family Medicine  Sep 16, 2024      Assessment & Plan       ICD-10-CM    1. Encounter for Medicare annual wellness exam  Z00.00       2. Swelling of right hand  M79.89 meloxicam (MOBIC) 15 MG tablet      3. Essential hypertension with goal blood pressure less than 140/90  I10 losartan (COZAAR) 25 MG tablet     DISCONTINUED: losartan (COZAAR) 25 MG tablet      4. Hyperlipidemia LDL goal <130  E78.5 pravastatin (PRAVACHOL) 40 MG tablet     DISCONTINUED: pravastatin (PRAVACHOL) 40 MG tablet      5. Prediabetes  R73.03       6. Morbid obesity (H)  E66.01       7. Chronic pain of both knees  M25.561 XR Knee AP Standing Bilateral    M25.562 meloxicam (MOBIC) 15 MG tablet    G89.29       8. Status post total left knee replacement  Z96.652 XR Knee AP Standing Bilateral     meloxicam (MOBIC) 15 MG tablet      9. Primary osteoarthritis of both knees  M17.0 XR Knee AP Standing Bilateral     meloxicam (MOBIC) 15 MG tablet      10. Benign prostatic hyperplasia with urinary retention  N40.1 tamsulosin (FLOMAX) 0.4 MG capsule    R33.8 DISCONTINUED: tamsulosin (FLOMAX) 0.4 MG capsule           Reviewed recommended screenings and ordered appropriate testing for pt's risks and per pt's request(s).   Likely mostly postoperative or related to chronic inflammation.  Recommended a trial of 1 week of NSAIDs.  Patient was amenable to this.  Follow-up as needed.  Clinically under acceptable control today.  Will continue current regimen and check monitoring labs regularly.  Clinically controlled today.  Will continue current regimen.  Check labs annually.  Clinically stable.  Encouraged lifestyle modifications.  Will continue current regimen and monitor annually.  Encourage efforts at lifestyle modifications.  Will continue to follow and assist as able.  7, 8, 9.  Unable to identify any abnormalities on exam today.  Will obtain x-ray to further evaluate.   "Recommended trial of 1 week of NSAIDs.  If not responding, he should probably follow-up with his orthopedic surgeon to ensure there is nothing else going on with his knee replacements as his pain is more significant than I would expect postoperatively.  10.  Clinically under fair control.  Discussed possible additional interventions, patient did not want to pursue anything different.  Will continue current regimen.  Follow-up as needed.        Portions of this note were completed using Dragon dictation software.  Although reviewed, there may be typographical and other inadvertent errors that remain.                 BMI  Estimated body mass index is 34.07 kg/m  as calculated from the following:    Height as of this encounter: 1.702 m (5' 7\").    Weight as of this encounter: 98.7 kg (217 lb 8 oz).   Weight management plan: Discussed healthy diet and exercise guidelines    Counseling  Appropriate preventive services were addressed with this patient via screening, questionnaire, or discussion as appropriate for fall prevention, nutrition, physical activity, Tobacco-use cessation, social engagement, weight loss and cognition.  Checklist reviewing preventive services available has been given to the patient.  Reviewed patient's diet, addressing concerns and/or questions.   The patient was provided with written information regarding signs of hearing loss.       See Patient Instructions  If your X-ray doesn't show a clear cause for your symptoms, we can consider having you see orthopedics for your knee pain.      Take meloxicam for 1 week to help with hand swelling and knee pain.  Stop if side effects.          Leslie Reza is a 84 year old, presenting for the following:  Wellness Visit        9/16/2024     8:24 AM   Additional Questions   Roomed by denise   Accompanied by wife         Health Care Directive  Patient does not have a Health Care Directive or Living Will: Advance Directive received and scanned. Click on " Code in the patient header to view.  12/20/2011 scanned in.    HPI    Pt recently had a ganglion cyst removal and a dupuytren's release on his right hand.  He's frustrated by ongoing swelling in that hand.  Some numbness, has some carpal tunnel symptoms as well.  He's not using his wrist splints at night.      They wonder about getting a handicapped permit.  He has a lot of pain in his knees.  Despite getting these replaced, he's having pain in both knees.  These were done in 2012 and 2013.      BPH symptoms are under fair control.  Still some urgency.  He thinks the Flomax is helpful.  Not bad enough to want to see surgery.    Doing well with his statin.  Denies side effects.      Not checking BP at home.  Hasn't missed doses.  No side effects.      BP Readings from Last 6 Encounters:   09/16/24 136/64   07/23/24 136/70   06/14/23 116/56   04/13/22 122/70   08/22/21 (!) 151/77   02/18/21 (!) 153/72             9/16/2024   General Health   How would you rate your overall physical health? Good   Feel stress (tense, anxious, or unable to sleep) To some extent      (!) STRESS CONCERN      9/16/2024   Nutrition   Diet: Regular (no restrictions)            9/16/2024   Exercise   Days per week of moderate/strenous exercise 6 days   Average minutes spent exercising at this level 30 min            9/16/2024   Social Factors   Frequency of gathering with friends or relatives Once a week   Worry food won't last until get money to buy more No   Food not last or not have enough money for food? No   Do you have housing? (Housing is defined as stable permanent housing and does not include staying ouside in a car, in a tent, in an abandoned building, in an overnight shelter, or couch-surfing.) Yes   Are you worried about losing your housing? No   Lack of transportation? No   Unable to get utilities (heat,electricity)? No            9/16/2024   Fall Risk   Fallen 2 or more times in the past year? Yes    Yes   Trouble with walking  or balance? Yes    Yes   Gait Speed Test (Document in seconds) 6.21   Gait Speed Test Interpretation Greater than 5.01 seconds - ABNORMAL       Multiple values from one day are sorted in reverse-chronological order          9/16/2024   Activities of Daily Living- Home Safety   Needs help with the following daily activites None of the above   Safety concerns in the home None of the above            9/16/2024   Dental   Dentist two times every year? Yes            9/16/2024   Hearing Screening   Hearing concerns? (!) I FEEL THAT PEOPLE ARE MUMBLING OR NOT SPEAKING CLEARLY.    (!) I NEED TO ASK PEOPLE TO SPEAK UP OR REPEAT THEMSELVES.    (!) IT'S HARDER TO UNDERSTAND WOMEN'S VOICES THAN MEN'S VOICES.    (!) IT'S HARD TO FOLLOW A CONVERSATION IN A NOISY RESTAURANT OR CROWDED ROOM.    (!) TROUBLE UNDESTANDING A SPEAKER IN A PUBLIC MEETING OR [employer] SERVICE.    (!) TROUBLE FOLLOWING DIALOGUE IN THE THEATHER.    (!) TROUBLE UNDERSTANDING SOFT OR WHISPERED SPEECH.    (!) TROUBLE UNDERSTANDING SPEECH ON THE TELEPHONE       Multiple values from one day are sorted in reverse-chronological order         9/16/2024   Driving Risk Screening   Patient/family members have concerns about driving No            9/16/2024   General Alertness/Fatigue Screening   Have you been more tired than usual lately? No            9/16/2024   Urinary Incontinence Screening   Bothered by leaking urine in past 6 months No            9/16/2024   TB Screening   Were you born outside of the [place]? No            Today's PHQ-2 Score:       9/16/2024     8:13 AM   PHQ-2 ( 1999 [employer])   Q1: Little interest or pleasure in doing things 0   Q2: Feeling down, depressed or hopeless 0   PHQ-2 Score 0   Q1: Little interest or pleasure in doing things Not at all   Q2: Feeling down, depressed or hopeless Not at all   PHQ-2 Score 0           9/16/2024   Substance Use   Alcohol more than 3/day or more than 7/wk No   Do you have a current opioid prescription? No   How  severe/bad is pain from 1 to 10? 4/10   Do you use any other substances recreationally? No        Social History     Tobacco Use    Smoking status: Former     Current packs/day: 0.00     Average packs/day: 0.5 packs/day for 15.0 years (7.5 ttl pk-yrs)     Types: Cigarettes     Start date: 1966     Quit date: 1981     Years since quittin.7    Smokeless tobacco: Former     Types: Chew     Quit date: 2/10/2017    Tobacco comments:     quit in    Vaping Use    Vaping status: Never Used   Substance Use Topics    Alcohol use: Yes     Comment: beer occ    Drug use: No             Reviewed and updated as needed this visit by Provider                    BP Readings from Last 3 Encounters:   24 136/64   24 136/70   23 116/56    Wt Readings from Last 3 Encounters:   24 98.7 kg (217 lb 8 oz)   24 98.4 kg (217 lb)   23 98.2 kg (216 lb 8 oz)                  Patient Active Problem List   Diagnosis    Impotence of organic origin    Obesity    Hyperlipidemia    Impaired fasting glucose    Spinal stenosis, lumbar region, without neurogenic claudication    History of colonic polyps    Family history of prostate cancer    Essential hypertension with goal blood pressure less than 140/90    Benign prostatic hyperplasia with urinary retention    Tinnitus    Microalbuminuria    LVH (left ventricular hypertrophy)    HYPERLIPIDEMIA LDL GOAL <130    S/P total knee arthroplasty    DVT prophylaxis    Obesity (BMI 35.0-39.9) with comorbidity (H)    Poor dentition    Abnormal swallowing    Lymphadenopathy - right sided mainly    Dupuytren's contracture of right hand    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    Anejaculation     Past Surgical History:   Procedure Laterality Date    ARTHROPLASTY KNEE  2012    hemiarthroplasty right knee    ARTHROPLASTY KNEE  2013    Procedure: ARTHROPLASTY KNEE;  Left Total Knee Arthroplasty;  Surgeon: Jose D Siegel MD;  Location:   OR    COLONOSCOPY  07, 06/08/10    COLONOSCOPY N/A 2015    Procedure: COLONOSCOPY;  Surgeon: Yg Rendon MD;  Location: [facility]    EXCISE MASS HAND  3/29/2013    Procedure: EXCISE MASS HAND;  excision right hand mass;  Surgeon: Rafa Moore MD;  Location: [facility] OR    HC REPAIR ING HERNIA,5+Y/O,REDUCIBL      X2    HC REVISE MEDIAN N/CARPAL TUNNEL SURG  2000    left wrist    HC REVISE MEDIAN N/CARPAL TUNNEL SURG  1994    right wrist    RELEASE CARPAL TUNNEL  2011    Procedure:RELEASE CARPAL TUNNEL; right carpal tunnel release, right ring and middle finger A-1 pulley releases    ; Surgeon:BARRETT CURRY; Location:[facility] OR    RELEASE TRIGGER FINGER  2011    Procedure:RELEASE TRIGGER FINGER; Surgeon:BARRETT CURRY; Location:[facility] OR    ZZHC COLONOSCOPY W/WO BRUSH/WASH      ZZHC COLONOSCOPY W/WO BRUSH/WASH         Social History     Tobacco Use    Smoking status: Former     Current packs/day: 0.00     Average packs/day: 0.5 packs/day for 15.0 years (7.5 ttl pk-yrs)     Types: Cigarettes     Start date: 1966     Quit date: 1981     Years since quittin.7    Smokeless tobacco: Former     Types: Chew     Quit date: 2/10/2017    Tobacco comments:     quit in    Substance Use Topics    Alcohol use: Yes     Comment: beer occ     Family History   Problem Relation Age of Onset    Heart Disease Mother         doctored for heart problems at young age    Prostate Cancer Father         age 70s    Stomach Cancer Brother         Cancer free now for over 10 years (T: 8/10/17)    Prostate Cancer Brother     Other - See Comments Brother         [place] virus fighting for about 6 weeks 10/08/18    Respiratory Brother         NE, uses CPAP    Cancer - colorectal No family hx of          Current Outpatient Medications   Medication Sig Dispense Refill    ascorbic acid (VITAMIN C) 1000 MG TABS Take 1 tablet by mouth daily.      losartan (COZAAR) 25 MG tablet TAKE 1 TABLET BY MOUTH  "DAILY 90 tablet 3    meloxicam (MOBIC) 15 MG tablet Take 0.5-1 tablets (7.5-15 mg) by mouth daily as needed for moderate pain. 30 tablet 0    pravastatin (PRAVACHOL) 40 MG tablet TAKE 1 TABLET BY MOUTH DAILY 180 tablet 3    tamsulosin (FLOMAX) 0.4 MG capsule TAKE 2 CAPSULES DAILY 180 capsule 3     Allergies   Allergen Reactions    No Known Drug Allergy      Recent Labs   Lab Test 07/23/24  1428 08/02/23  0804 06/14/23  1018 04/13/22  0946 08/22/21  1024 12/23/20  1058 05/21/20  1330   A1C 5.9* 6.0*  --   --   --   --   --    LDL 80  --  76 76  --  81  --    HDL 41  --  41 38*  --  39*  --    TRIG 80  --  77 125  --  158*  --    ALT  --   --  21 35  --  30 48   CR 0.67  --  0.71 0.77   < > 0.73 0.79   GFRESTIMATED >90  --  90 89   < > 86 84   GFRESTBLACK  --   --   --   --   --  >90 >90   POTASSIUM 4.2  --  4.3 4.3   < > 4.5 4.2   TSH  --   --   --   --   --   --  1.57    < > = values in this interval not displayed.      Current providers sharing in care for this patient include:  Patient Care Team:  Timo Howard MD as PCP - General (Family Practice)  Timo Howard MD as Assigned PCP    The following health maintenance items are reviewed in Epic and correct as of today:  Health Maintenance   Topic Date Due    COVID-19 Vaccine (8 - 2024-25 season) 09/01/2024    BMP  07/23/2025    LIPID  07/23/2025    MEDICARE ANNUAL WELLNESS VISIT  09/16/2025    ANNUAL REVIEW OF HM ORDERS  09/16/2025    FALL RISK ASSESSMENT  09/16/2025    DTAP/TDAP/TD IMMUNIZATION (2 - Td or Tdap) 07/12/2027    ADVANCE CARE PLANNING  09/16/2029    PHQ-2 (once per calendar year)  Completed    INFLUENZA VACCINE  Completed    Pneumococcal Vaccine: 65+ Years  Completed    ZOSTER IMMUNIZATION  Completed    HPV IMMUNIZATION  Aged Out    MENINGITIS IMMUNIZATION  Aged Out    RSV MONOCLONAL ANTIBODY  Aged Out            Objective    Exam  /64   Pulse 51   Temp 97.6  F (36.4  C) (Temporal)   Resp 11   Ht 1.702 m (5' 7\")   Wt 98.7 " "kg (217 lb 8 oz)   SpO2 96%   BMI 34.07 kg/m     Estimated body mass index is 34.07 kg/m  as calculated from the following:    Height as of this encounter: 1.702 m (5' 7\").    Weight as of this encounter: 98.7 kg (217 lb 8 oz).    Physical Exam  GENERAL: alert and no distress  NECK: no adenopathy, no asymmetry, masses, or scars  RESP: lungs clear to auscultation - no rales, rhonchi or wheezes  CV: regular rate and rhythm, normal S1 S2, no S3 or S4, no murmur, click or rub, no peripheral edema  ABDOMEN: soft, nontender, no hepatosplenomegaly, no masses and bowel sounds normal  MS: no gross musculoskeletal defects noted, no edema  MS: right hand swelling.  No clear cause/location for pain in his knees.          9/16/2024   Mini Cog   Clock Draw Score 0 Abnormal   3 Item Recall 3 objects recalled   Mini Cog Total Score 3                 Signed Electronically by: Timo Howard MD, MD    "

## 2024-09-18 ENCOUNTER — ALLIED HEALTH/NURSE VISIT (OUTPATIENT)
Dept: FAMILY MEDICINE | Facility: OTHER | Age: 84
End: 2024-09-18
Payer: COMMERCIAL

## 2024-09-18 DIAGNOSIS — Z23 COVID-19 VACCINE ADMINISTERED: Primary | ICD-10-CM

## 2024-09-18 PROCEDURE — 90480 ADMN SARSCOV2 VAC 1/ONLY CMP: CPT

## 2024-09-18 PROCEDURE — 99207 PR NO CHARGE NURSE ONLY: CPT

## 2024-09-18 PROCEDURE — 91320 SARSCV2 VAC 30MCG TRS-SUC IM: CPT

## 2024-09-19 ENCOUNTER — ANCILLARY PROCEDURE (OUTPATIENT)
Dept: GENERAL RADIOLOGY | Facility: OTHER | Age: 84
End: 2024-09-19
Attending: FAMILY MEDICINE
Payer: COMMERCIAL

## 2024-09-19 DIAGNOSIS — G89.29 CHRONIC PAIN OF BOTH KNEES: ICD-10-CM

## 2024-09-19 DIAGNOSIS — M25.561 CHRONIC PAIN OF BOTH KNEES: ICD-10-CM

## 2024-09-19 DIAGNOSIS — M17.0 PRIMARY OSTEOARTHRITIS OF BOTH KNEES: ICD-10-CM

## 2024-09-19 DIAGNOSIS — Z96.652 STATUS POST TOTAL LEFT KNEE REPLACEMENT: ICD-10-CM

## 2024-09-19 DIAGNOSIS — M25.562 CHRONIC PAIN OF BOTH KNEES: ICD-10-CM

## 2024-09-19 PROCEDURE — 73565 X-RAY EXAM OF KNEES: CPT | Mod: TC | Performed by: RADIOLOGY

## 2024-09-19 NOTE — RESULT ENCOUNTER NOTE
1 finding on your x-ray shows possible loosening of the artificial joint.  I would recommend follow-up with orthopedics.  Let me know if you need help setting this up.  Unclear if this is contributing to your pain or not.    Have a nice day!    Dr. Howard

## 2024-09-20 ENCOUNTER — TELEPHONE (OUTPATIENT)
Dept: FAMILY MEDICINE | Facility: OTHER | Age: 84
End: 2024-09-20
Payer: COMMERCIAL

## 2024-09-20 NOTE — TELEPHONE ENCOUNTER
----- Message from Cesilia ALSTON sent at 9/20/2024 11:39 AM CDT -----    ----- Message -----  From: Timo Howard MD  Sent: 9/19/2024   2:30 PM CDT  To: [facility] Pool    1 finding on your x-ray shows possible loosening of the artificial joint.  I would recommend follow-up with orthopedics.  Let me know if you need help setting this up.  Unclear if this is contributing to your pain or not.    Have a nice day!    Dr. Howard

## 2024-09-20 NOTE — TELEPHONE ENCOUNTER
Spoke to patient informed him of his refills, scheduled patient for physical come August.     Karina Edward MA     [de-identified] : 66 years old male has past medical history of prediabetes, allergic rhinitis and conjunctivitis, atopic dermatitis, hypertension, hyperlipidemia, GERD, asthma, and vitamin D deficiency, came back for annual physical exam.  Pt requested Rx of waist brace. His current waist brace is too hard, very uncomfortable.

## 2024-09-20 NOTE — TELEPHONE ENCOUNTER
Called and relayed message to patient. He expressed understanding. Transferred to central scheduling to assist with scheduling with ortho.     Alta Moon RN on 9/20/2024 at 2:30 PM

## 2024-10-06 DIAGNOSIS — M17.0 PRIMARY OSTEOARTHRITIS OF BOTH KNEES: ICD-10-CM

## 2024-10-06 DIAGNOSIS — M79.89 SWELLING OF RIGHT HAND: ICD-10-CM

## 2024-10-06 DIAGNOSIS — G89.29 CHRONIC PAIN OF BOTH KNEES: ICD-10-CM

## 2024-10-06 DIAGNOSIS — M25.562 CHRONIC PAIN OF BOTH KNEES: ICD-10-CM

## 2024-10-06 DIAGNOSIS — M25.561 CHRONIC PAIN OF BOTH KNEES: ICD-10-CM

## 2024-10-06 DIAGNOSIS — Z96.652 STATUS POST TOTAL LEFT KNEE REPLACEMENT: ICD-10-CM

## 2024-10-07 RX ORDER — MELOXICAM 15 MG/1
TABLET ORAL
Qty: 90 TABLET | Refills: 1 | Status: SHIPPED | OUTPATIENT
Start: 2024-10-07